# Patient Record
Sex: MALE | Race: WHITE | NOT HISPANIC OR LATINO
[De-identification: names, ages, dates, MRNs, and addresses within clinical notes are randomized per-mention and may not be internally consistent; named-entity substitution may affect disease eponyms.]

---

## 2017-01-10 ENCOUNTER — APPOINTMENT (OUTPATIENT)
Dept: PULMONOLOGY | Facility: CLINIC | Age: 72
End: 2017-01-10

## 2017-01-12 ENCOUNTER — APPOINTMENT (OUTPATIENT)
Dept: PULMONOLOGY | Facility: CLINIC | Age: 72
End: 2017-01-12

## 2017-04-10 ENCOUNTER — OTHER (OUTPATIENT)
Age: 72
End: 2017-04-10

## 2017-09-07 ENCOUNTER — APPOINTMENT (OUTPATIENT)
Dept: PULMONOLOGY | Facility: CLINIC | Age: 72
End: 2017-09-07
Payer: MEDICARE

## 2017-09-07 VITALS — SYSTOLIC BLOOD PRESSURE: 128 MMHG | DIASTOLIC BLOOD PRESSURE: 86 MMHG

## 2017-09-07 VITALS — BODY MASS INDEX: 33.47 KG/M2 | WEIGHT: 240 LBS

## 2017-09-07 VITALS — HEART RATE: 65 BPM | OXYGEN SATURATION: 93 %

## 2017-09-07 PROCEDURE — 99215 OFFICE O/P EST HI 40 MIN: CPT

## 2017-09-19 ENCOUNTER — APPOINTMENT (OUTPATIENT)
Dept: PULMONOLOGY | Facility: CLINIC | Age: 72
End: 2017-09-19

## 2017-11-15 ENCOUNTER — APPOINTMENT (OUTPATIENT)
Dept: PULMONOLOGY | Facility: CLINIC | Age: 72
End: 2017-11-15
Payer: MEDICARE

## 2017-11-15 VITALS
HEIGHT: 71 IN | DIASTOLIC BLOOD PRESSURE: 60 MMHG | SYSTOLIC BLOOD PRESSURE: 112 MMHG | BODY MASS INDEX: 32.62 KG/M2 | WEIGHT: 233 LBS

## 2017-11-15 VITALS — HEART RATE: 85 BPM | OXYGEN SATURATION: 92 %

## 2017-11-15 VITALS — OXYGEN SATURATION: 97 %

## 2017-11-15 PROCEDURE — 99214 OFFICE O/P EST MOD 30 MIN: CPT

## 2017-11-15 RX ORDER — METOPROLOL SUCCINATE 50 MG/1
50 TABLET, EXTENDED RELEASE ORAL
Qty: 90 | Refills: 0 | Status: DISCONTINUED | COMMUNITY
Start: 2017-04-10 | End: 2017-11-15

## 2017-11-15 RX ORDER — CEFDINIR 300 MG/1
300 CAPSULE ORAL
Qty: 20 | Refills: 0 | Status: DISCONTINUED | COMMUNITY
Start: 2017-06-07 | End: 2017-11-15

## 2017-12-11 ENCOUNTER — APPOINTMENT (OUTPATIENT)
Dept: PULMONOLOGY | Facility: CLINIC | Age: 72
End: 2017-12-11

## 2017-12-13 ENCOUNTER — APPOINTMENT (OUTPATIENT)
Dept: PULMONOLOGY | Facility: CLINIC | Age: 72
End: 2017-12-13
Payer: MEDICARE

## 2017-12-13 VITALS
TEMPERATURE: 98.1 F | SYSTOLIC BLOOD PRESSURE: 160 MMHG | RESPIRATION RATE: 14 BRPM | BODY MASS INDEX: 32.62 KG/M2 | WEIGHT: 233 LBS | HEIGHT: 71 IN | HEART RATE: 63 BPM | DIASTOLIC BLOOD PRESSURE: 80 MMHG | OXYGEN SATURATION: 94 %

## 2017-12-13 DIAGNOSIS — J32.9 CHRONIC SINUSITIS, UNSPECIFIED: ICD-10-CM

## 2017-12-13 DIAGNOSIS — R09.02 HYPOXEMIA: ICD-10-CM

## 2017-12-13 PROCEDURE — 99215 OFFICE O/P EST HI 40 MIN: CPT | Mod: 25

## 2017-12-13 PROCEDURE — 94010 BREATHING CAPACITY TEST: CPT

## 2018-01-19 ENCOUNTER — APPOINTMENT (OUTPATIENT)
Dept: PULMONOLOGY | Facility: CLINIC | Age: 73
End: 2018-01-19
Payer: MEDICARE

## 2018-01-19 VITALS
BODY MASS INDEX: 32.78 KG/M2 | SYSTOLIC BLOOD PRESSURE: 138 MMHG | HEART RATE: 89 BPM | HEIGHT: 71 IN | DIASTOLIC BLOOD PRESSURE: 70 MMHG

## 2018-01-19 VITALS — WEIGHT: 235 LBS | BODY MASS INDEX: 32.78 KG/M2

## 2018-01-19 DIAGNOSIS — Z01.811 ENCOUNTER FOR PREPROCEDURAL RESPIRATORY EXAMINATION: ICD-10-CM

## 2018-01-19 PROCEDURE — 94010 BREATHING CAPACITY TEST: CPT

## 2018-01-19 PROCEDURE — 99214 OFFICE O/P EST MOD 30 MIN: CPT | Mod: 25

## 2018-01-22 ENCOUNTER — APPOINTMENT (OUTPATIENT)
Dept: PULMONOLOGY | Facility: CLINIC | Age: 73
End: 2018-01-22
Payer: MEDICARE

## 2018-01-22 VITALS
BODY MASS INDEX: 32.5 KG/M2 | WEIGHT: 233 LBS | HEART RATE: 66 BPM | SYSTOLIC BLOOD PRESSURE: 130 MMHG | DIASTOLIC BLOOD PRESSURE: 80 MMHG

## 2018-01-22 VITALS — RESPIRATION RATE: 16 BRPM

## 2018-01-22 VITALS — OXYGEN SATURATION: 93 %

## 2018-01-22 PROCEDURE — 99214 OFFICE O/P EST MOD 30 MIN: CPT

## 2018-01-22 RX ORDER — METHYLPREDNISOLONE 4 MG/1
4 TABLET ORAL
Qty: 1 | Refills: 3 | Status: DISCONTINUED | COMMUNITY
Start: 2017-11-15 | End: 2018-01-22

## 2018-01-22 RX ORDER — POTASSIUM CHLORIDE 750 MG/1
10 TABLET, EXTENDED RELEASE ORAL
Refills: 0 | Status: DISCONTINUED | COMMUNITY
End: 2018-01-22

## 2018-03-26 ENCOUNTER — APPOINTMENT (OUTPATIENT)
Dept: GASTROENTEROLOGY | Facility: CLINIC | Age: 73
End: 2018-03-26
Payer: MEDICARE

## 2018-03-26 VITALS
SYSTOLIC BLOOD PRESSURE: 123 MMHG | WEIGHT: 218 LBS | DIASTOLIC BLOOD PRESSURE: 64 MMHG | RESPIRATION RATE: 16 BRPM | HEIGHT: 71 IN | OXYGEN SATURATION: 98 % | BODY MASS INDEX: 30.52 KG/M2 | HEART RATE: 60 BPM

## 2018-03-26 DIAGNOSIS — Z03.89 ENCOUNTER FOR OBSERVATION FOR OTHER SUSPECTED DISEASES AND CONDITIONS RULED OUT: ICD-10-CM

## 2018-03-26 PROCEDURE — 99204 OFFICE O/P NEW MOD 45 MIN: CPT

## 2018-03-26 PROCEDURE — 82270 OCCULT BLOOD FECES: CPT

## 2018-03-26 RX ORDER — AZELASTINE HYDROCHLORIDE 137 UG/1
0.1 SPRAY, METERED NASAL
Qty: 30 | Refills: 0 | Status: DISCONTINUED | COMMUNITY
Start: 2017-06-07 | End: 2018-03-26

## 2018-03-29 ENCOUNTER — RX RENEWAL (OUTPATIENT)
Age: 73
End: 2018-03-29

## 2018-04-23 ENCOUNTER — APPOINTMENT (OUTPATIENT)
Dept: PULMONOLOGY | Facility: CLINIC | Age: 73
End: 2018-04-23

## 2018-04-27 ENCOUNTER — APPOINTMENT (OUTPATIENT)
Dept: PULMONOLOGY | Facility: CLINIC | Age: 73
End: 2018-04-27
Payer: MEDICARE

## 2018-04-27 VITALS
WEIGHT: 235 LBS | HEART RATE: 62 BPM | SYSTOLIC BLOOD PRESSURE: 118 MMHG | HEIGHT: 71 IN | BODY MASS INDEX: 32.9 KG/M2 | DIASTOLIC BLOOD PRESSURE: 74 MMHG | OXYGEN SATURATION: 92 %

## 2018-04-27 PROCEDURE — 99215 OFFICE O/P EST HI 40 MIN: CPT

## 2018-05-10 ENCOUNTER — OUTPATIENT (OUTPATIENT)
Dept: OUTPATIENT SERVICES | Facility: HOSPITAL | Age: 73
LOS: 1 days | End: 2018-05-10
Payer: MEDICARE

## 2018-05-10 ENCOUNTER — APPOINTMENT (OUTPATIENT)
Dept: GASTROENTEROLOGY | Facility: GI CENTER | Age: 73
End: 2018-05-10
Payer: MEDICARE

## 2018-05-10 ENCOUNTER — RESULT REVIEW (OUTPATIENT)
Age: 73
End: 2018-05-10

## 2018-05-10 DIAGNOSIS — Z12.11 ENCOUNTER FOR SCREENING FOR MALIGNANT NEOPLASM OF COLON: ICD-10-CM

## 2018-05-10 DIAGNOSIS — Z80.0 FAMILY HISTORY OF MALIGNANT NEOPLASM OF DIGESTIVE ORGANS: ICD-10-CM

## 2018-05-10 PROCEDURE — 88305 TISSUE EXAM BY PATHOLOGIST: CPT

## 2018-05-10 PROCEDURE — 88305 TISSUE EXAM BY PATHOLOGIST: CPT | Mod: 26

## 2018-05-10 PROCEDURE — 45380 COLONOSCOPY AND BIOPSY: CPT | Mod: PT

## 2018-05-10 PROCEDURE — 45380 COLONOSCOPY AND BIOPSY: CPT

## 2018-05-15 LAB — SURGICAL PATHOLOGY FINAL REPORT - CH: SIGNIFICANT CHANGE UP

## 2018-10-01 ENCOUNTER — APPOINTMENT (OUTPATIENT)
Dept: PULMONOLOGY | Facility: CLINIC | Age: 73
End: 2018-10-01
Payer: MEDICARE

## 2018-10-01 VITALS — DIASTOLIC BLOOD PRESSURE: 60 MMHG | SYSTOLIC BLOOD PRESSURE: 118 MMHG | BODY MASS INDEX: 32.92 KG/M2 | WEIGHT: 236 LBS

## 2018-10-01 VITALS — HEART RATE: 56 BPM | OXYGEN SATURATION: 94 %

## 2018-10-01 DIAGNOSIS — J45.909 UNSPECIFIED ASTHMA, UNCOMPLICATED: ICD-10-CM

## 2018-10-01 DIAGNOSIS — R59.0 LOCALIZED ENLARGED LYMPH NODES: ICD-10-CM

## 2018-10-01 DIAGNOSIS — G47.33 OBSTRUCTIVE SLEEP APNEA (ADULT) (PEDIATRIC): ICD-10-CM

## 2018-10-01 PROCEDURE — 99215 OFFICE O/P EST HI 40 MIN: CPT

## 2018-10-01 RX ORDER — POLYETHYLENE GLYCOL-3350, SODIUM CHLORIDE, POTASSIUM CHLORIDE AND SODIUM BICARBONATE 420; 11.2; 5.72; 1.48 G/438.4G; G/438.4G; G/438.4G; G/438.4G
420 POWDER, FOR SOLUTION ORAL
Qty: 4000 | Refills: 0 | Status: DISCONTINUED | COMMUNITY
Start: 2017-11-13 | End: 2018-10-01

## 2018-10-01 RX ORDER — SPIRONOLACTONE 25 MG/1
25 TABLET ORAL
Qty: 90 | Refills: 0 | Status: DISCONTINUED | COMMUNITY
Start: 2018-02-01 | End: 2018-10-01

## 2018-10-01 RX ORDER — SODIUM SULFATE, POTASSIUM SULFATE, MAGNESIUM SULFATE 17.5; 3.13; 1.6 G/ML; G/ML; G/ML
17.5-3.13-1.6 SOLUTION, CONCENTRATE ORAL
Qty: 1 | Refills: 0 | Status: DISCONTINUED | COMMUNITY
Start: 2018-03-29 | End: 2018-10-01

## 2018-10-01 RX ORDER — AMLODIPINE BESYLATE 10 MG/1
10 TABLET ORAL
Qty: 90 | Refills: 0 | Status: DISCONTINUED | COMMUNITY
Start: 2017-04-25 | End: 2018-10-01

## 2019-02-12 ENCOUNTER — MESSAGE (OUTPATIENT)
Age: 74
End: 2019-02-12

## 2019-02-20 ENCOUNTER — APPOINTMENT (OUTPATIENT)
Dept: PULMONOLOGY | Facility: CLINIC | Age: 74
End: 2019-02-20

## 2019-04-08 ENCOUNTER — APPOINTMENT (OUTPATIENT)
Dept: GASTROENTEROLOGY | Facility: CLINIC | Age: 74
End: 2019-04-08
Payer: MEDICARE

## 2019-04-08 VITALS
RESPIRATION RATE: 15 BRPM | HEART RATE: 60 BPM | OXYGEN SATURATION: 98 % | HEIGHT: 71 IN | BODY MASS INDEX: 32.48 KG/M2 | DIASTOLIC BLOOD PRESSURE: 76 MMHG | SYSTOLIC BLOOD PRESSURE: 156 MMHG | WEIGHT: 232 LBS

## 2019-04-08 DIAGNOSIS — Z87.891 PERSONAL HISTORY OF NICOTINE DEPENDENCE: ICD-10-CM

## 2019-04-08 PROCEDURE — 99214 OFFICE O/P EST MOD 30 MIN: CPT

## 2019-04-08 PROCEDURE — 82272 OCCULT BLD FECES 1-3 TESTS: CPT

## 2019-04-08 RX ORDER — FUROSEMIDE 40 MG/1
40 TABLET ORAL
Qty: 90 | Refills: 0 | Status: DISCONTINUED | COMMUNITY
Start: 2018-04-20 | End: 2019-04-08

## 2019-04-08 NOTE — ASSESSMENT
[FreeTextEntry1] : New fasting, dyspepsia, and some nocturnal symptoms. All seemingly better with the use of an H2 receptor antagonist. No medication side effects. Multiple significant comorbidities, medical conditions including, obstructive sleep apnea, and pulmonary hypertension and thus, patient is a poor candidate for elective endoscopic procedures. \par Plan:  Urea breath test and treat accordingly. Continue with the Pepcid. Perform upper GI series. We'll for results. Office followup in 4 months. Will hold on endoscopy presently.

## 2019-04-08 NOTE — PHYSICAL EXAM
[General Appearance - Alert] : alert [General Appearance - In No Acute Distress] : in no acute distress [Sclera] : the sclera and conjunctiva were normal [PERRL With Normal Accommodation] : pupils were equal in size, round, and reactive to light [Extraocular Movements] : extraocular movements were intact [Outer Ear] : the ears and nose were normal in appearance [Oropharynx] : the oropharynx was normal [Neck Appearance] : the appearance of the neck was normal [Neck Cervical Mass (___cm)] : no neck mass was observed [Jugular Venous Distention Increased] : there was no jugular-venous distention [Thyroid Diffuse Enlargement] : the thyroid was not enlarged [Thyroid Nodule] : there were no palpable thyroid nodules [Auscultation Breath Sounds / Voice Sounds] : lungs were clear to auscultation bilaterally [Heart Rate And Rhythm] : heart rate was normal and rhythm regular [Heart Sounds Gallop] : no gallops [Heart Sounds] : normal S1 and S2 [Murmurs] : no murmurs [Heart Sounds Pericardial Friction Rub] : no pericardial rub [Bowel Sounds] : normal bowel sounds [Abdomen Soft] : soft [Abdomen Tenderness] : non-tender [] : no hepato-splenomegaly [Abdomen Mass (___ Cm)] : no abdominal mass palpated [Normal Sphincter Tone] : normal sphincter tone [No Rectal Mass] : no rectal mass [Occult Blood Positive] : stool positive for occult blood [Internal Hemorrhoid] : no internal hemorrhoids [External Hemorrhoid] : no external hemorrhoids [FreeTextEntry1] : No hemorrhoids. No lesions. small flecks of brown stool, but occult blood positive No lesions.

## 2019-04-08 NOTE — HISTORY OF PRESENT ILLNESS
[FreeTextEntry1] : 73-year-old white male with history of obstructive sleep apnea, and pulmonary hypertension, who underwent a screening colonoscopy one year ago with the finding of hemorrhoids and diverticulosis and a very small right-sided hyperplastic polyp. Patient now presents with episodic dyspepsia usually the fasting, over the course of the past 3 weeks. No prior history of peptic ulcer disease. No bleeding overtly. No diarrhea no constipation no weight loss. Patient does have a history of hemorrhoids. Patient denies heartburn, regurgitation, dysphagia or odynophagia.. Has been started on an H2 blocker at bedtime, Pepcid 40 mg. Notes some improvement overall in symptoms. No upper GI alarm symptoms.

## 2019-05-30 ENCOUNTER — APPOINTMENT (OUTPATIENT)
Dept: GASTROENTEROLOGY | Facility: CLINIC | Age: 74
End: 2019-05-30
Payer: MEDICARE

## 2019-05-30 VITALS
WEIGHT: 238 LBS | BODY MASS INDEX: 33.32 KG/M2 | HEIGHT: 71 IN | DIASTOLIC BLOOD PRESSURE: 75 MMHG | SYSTOLIC BLOOD PRESSURE: 120 MMHG | HEART RATE: 76 BPM

## 2019-05-30 PROCEDURE — 99214 OFFICE O/P EST MOD 30 MIN: CPT

## 2019-05-31 NOTE — HISTORY OF PRESENT ILLNESS
[FreeTextEntry1] : 73-year-old white male with history of obstructive sleep apnea, pulmonary hypertension, replaced aortic valve diverticulosis and hemorrhoids. Patient had recently developed dyspepsia, somewhat responsive to the use of H2 RA's. CAT scan performed in 3/26/19 allegedly shows cirrhosis and ascites. Recent upper GI series was unremarkable. Mild  duodenal wall thickening noted. No ulcer, tumor, or inflammation identified. Area breath test was negative. \par Prior colonoscopy in 2018 had shown diverticulosis and hemorrhoids. \par Patient now has developed new atrial fibrillation and has been placed on anticoagulation with Coumadin. He is scheduled for cardiac catheterization next week at [facility].\par He reports recent dietary indiscretions with weight increase to 245 and more recently, gradual reduction to 238.

## 2019-05-31 NOTE — ASSESSMENT
[FreeTextEntry1] : Upper GI series was negative. Urea breath test was negative.\par On exam. Patient now has anasarca, edema, and ascites, and a protuberant abdomen. Recent CAT scan from March showed cirrhotic liver and ascites.\par Patient has a long history of pulmonary hypertension, obstructive sleep apnea. It is likely that his cirrhosis is cardiac in origin. He is already on generous doses of diuretics via the renal service. He is known to have renal insufficiency.\par He is awaiting further cardiac evaluation next week at [facility]. GI office followup in 3 months. Maintain low salt diet.

## 2019-07-30 ENCOUNTER — OTHER (OUTPATIENT)
Age: 74
End: 2019-07-30

## 2019-08-09 ENCOUNTER — APPOINTMENT (OUTPATIENT)
Dept: GASTROENTEROLOGY | Facility: CLINIC | Age: 74
End: 2019-08-09
Payer: MEDICARE

## 2019-08-09 VITALS
WEIGHT: 232 LBS | HEART RATE: 67 BPM | DIASTOLIC BLOOD PRESSURE: 70 MMHG | SYSTOLIC BLOOD PRESSURE: 130 MMHG | BODY MASS INDEX: 32.48 KG/M2 | HEIGHT: 71 IN

## 2019-08-09 PROCEDURE — 99215 OFFICE O/P EST HI 40 MIN: CPT

## 2019-08-09 RX ORDER — ALBUTEROL SULFATE 90 UG/1
108 (90 BASE) AEROSOL, METERED RESPIRATORY (INHALATION)
Qty: 18 | Refills: 0 | Status: DISCONTINUED | COMMUNITY
Start: 2017-06-07 | End: 2019-08-09

## 2019-08-09 RX ORDER — TAMSULOSIN HYDROCHLORIDE 0.4 MG/1
0.4 CAPSULE ORAL
Refills: 0 | Status: DISCONTINUED | COMMUNITY
End: 2019-08-09

## 2019-08-09 RX ORDER — AMOXICILLIN AND CLAVULANATE POTASSIUM 500; 125 MG/1; 1/1
500-125 TABLET, FILM COATED ORAL
Refills: 0 | Status: DISCONTINUED | COMMUNITY
End: 2019-08-09

## 2019-08-09 NOTE — PHYSICAL EXAM
[General Appearance - Alert] : alert [General Appearance - In No Acute Distress] : in no acute distress [PERRL With Normal Accommodation] : pupils were equal in size, round, and reactive to light [Sclera] : the sclera and conjunctiva were normal [Extraocular Movements] : extraocular movements were intact [Outer Ear] : the ears and nose were normal in appearance [Oropharynx] : the oropharynx was normal [Neck Appearance] : the appearance of the neck was normal [Neck Cervical Mass (___cm)] : no neck mass was observed [Thyroid Diffuse Enlargement] : the thyroid was not enlarged [Jugular Venous Distention Increased] : there was no jugular-venous distention [Thyroid Nodule] : there were no palpable thyroid nodules [] : no respiratory distress [Auscultation Breath Sounds / Voice Sounds] : lungs were clear to auscultation bilaterally [Heart Rate And Rhythm] : heart rate was normal and rhythm regular [Heart Sounds] : normal S1 and S2 [Heart Sounds Gallop] : no gallops [Murmurs] : no murmurs [Heart Sounds Pericardial Friction Rub] : no pericardial rub [Normal Sphincter Tone] : normal sphincter tone [No Rectal Mass] : no rectal mass [Internal Hemorrhoid] : no internal hemorrhoids [External Hemorrhoid] : no external hemorrhoids [Occult Blood Positive] : stool positive for occult blood [FreeTextEntry1] : +3 pedal edema extending to the thighs

## 2019-08-09 NOTE — ASSESSMENT
[FreeTextEntry1] : Ascites, renal failure, unclear etiology. BPH, may be contributing. New Jain catheter inserted just 3 days ago. Prior fatty liver on imaging. No prior obvious cirrhosis. No prior history of liver disease. Patient is acting as if he has cirrhosis and likely cryptogenic. Recent blood work shows modest elevations of the alkaline phosphatase and low platelet count and normal LFTs otherwise. No history of encephalopathy or GI bleeding.\par Acute renal failure  noted on blood work.\par Patient is newly on Coumadin for atrial fibrillation. Tolerating well. No overt bleeding. \par Plan for abdominal sonogram to evaluate the liver and marked site for ascites tap. Patient referred to us outside hospital for paracentesis via interventional radiology. Patient advised to stop Coumadin 5 days prior to the paracentesis; large-volume paracentesis requested. Repeat blood work for hepatitis profiles and autoimmune panels. Repeat CBC, chemistry and PT, INR. Office followup here in 2 months. He was advised low-sodium diet. Daily, weight log advised. \par

## 2019-08-09 NOTE — HISTORY OF PRESENT ILLNESS
[FreeTextEntry1] : 73-year-old white male with history of hypertension, hyperlipidemia, gout, chronic, pulmonary hypertension, for greater than 4 years. Recent cardiac catheterization at [facility]:  negative for coronary disease. Recent imaging had shown the presence of ascites. Followed by cardiology urology nephrology mostly from [facility]. Alleges that urology evaluation indicated urinary bladder to be poorly emptying. Blood work done one week ago for me indicates a BUN of 81 and creatinine of 2.1. Patient had a Jain catheter inserted 3 days ago at urology. He is due for urology followup in a short period of time. He is already on Bumex as a diuretic 20 mg per day. He has recently been started on Coumadin 7.5 mg per day for new atrial fibrillation.\par There is been no prior workup for liver disease or cirrhosis.  Fatty liver has previously been observed on imaging. Ascites is a new finding. Patient's weight has been fluctuating despite being on diuretics and, despite being adherent to a low sodium diet.\par Patient gives no prior history of liver disease. No history of viral hepatitis or needle stick injury or transfusions. Past surgical history mitral valve repair.  \par Current weight 232. The patient has normal abdominal distention and diffuse brawny leg edema. Denies having any confusion. No internal bleeding. Even with the new Coumadin. No history of alcohol abuse.

## 2019-08-13 ENCOUNTER — FORM ENCOUNTER (OUTPATIENT)
Age: 74
End: 2019-08-13

## 2019-08-14 ENCOUNTER — OUTPATIENT (OUTPATIENT)
Dept: OUTPATIENT SERVICES | Facility: HOSPITAL | Age: 74
LOS: 1 days | End: 2019-08-14

## 2019-08-14 ENCOUNTER — APPOINTMENT (OUTPATIENT)
Dept: ULTRASOUND IMAGING | Facility: CLINIC | Age: 74
End: 2019-08-14
Payer: MEDICARE

## 2019-08-14 DIAGNOSIS — I27.20 PULMONARY HYPERTENSION, UNSPECIFIED: ICD-10-CM

## 2019-08-14 PROCEDURE — 76700 US EXAM ABDOM COMPLETE: CPT | Mod: 26

## 2019-08-28 ENCOUNTER — APPOINTMENT (OUTPATIENT)
Dept: UROLOGY | Facility: CLINIC | Age: 74
End: 2019-08-28
Payer: MEDICARE

## 2019-08-28 VITALS
WEIGHT: 235 LBS | BODY MASS INDEX: 32.9 KG/M2 | SYSTOLIC BLOOD PRESSURE: 135 MMHG | HEART RATE: 61 BPM | HEIGHT: 71 IN | OXYGEN SATURATION: 95 % | DIASTOLIC BLOOD PRESSURE: 76 MMHG

## 2019-08-28 PROCEDURE — 99203 OFFICE O/P NEW LOW 30 MIN: CPT

## 2019-08-28 NOTE — REVIEW OF SYSTEMS
[Shortness Of Breath] : shortness of breath [see HPI] : see HPI [Limb Weakness] : limb weakness [Negative] : Heme/Lymph

## 2019-09-03 NOTE — ASSESSMENT
[FreeTextEntry1] : 74 yo male with urinary retention s/p TURP.  Will plan for UDS with cysto to evaluate bladder contractility and determine best treatment option.  If persistent outlet obstruction, may require redo TURP.

## 2019-09-03 NOTE — PHYSICAL EXAM
[General Appearance - Well Developed] : well developed [General Appearance - Well Nourished] : well nourished [Normal Appearance] : normal appearance [Well Groomed] : well groomed [General Appearance - In No Acute Distress] : no acute distress [Exaggerated Use Of Accessory Muscles For Inspiration] : no accessory muscle use [Respiration, Rhythm And Depth] : normal respiratory rhythm and effort [Abdomen Soft] : soft [Costovertebral Angle Tenderness] : no ~M costovertebral angle tenderness [Abdomen Tenderness] : non-tender [Normal Station and Gait] : the gait and station were normal for the patient's age [] : no rash [No Focal Deficits] : no focal deficits [Sensation] : the sensory exam was normal to light touch and pinprick [Oriented To Time, Place, And Person] : oriented to person, place, and time [Affect] : the affect was normal [Not Anxious] : not anxious [FreeTextEntry1] : Jain Catheter in place and attached to leg bag.

## 2019-09-03 NOTE — HISTORY OF PRESENT ILLNESS
[Urinary Retention] : urinary retention [FreeTextEntry1] : Mr Lopez is a 72 yo male with urinary retention and urinary issues. He was seen at [facility] with urinary retention and had a Jain placed in October 2019.  He is a patient of Dr Bonilla (Urologist) who did a TURP in 12/2018 and in February 2019 and continues to have urinary retention. He currently has a Jain.  He is here for a second opinion for further treatment.  He had been offered continuation of indwelling Jain, CIC, and suprapubic tube.  He denies having had a urodynamics at any time.

## 2019-09-10 ENCOUNTER — APPOINTMENT (OUTPATIENT)
Dept: UROLOGY | Facility: CLINIC | Age: 74
End: 2019-09-10
Payer: MEDICARE

## 2019-09-10 ENCOUNTER — OUTPATIENT (OUTPATIENT)
Dept: OUTPATIENT SERVICES | Facility: HOSPITAL | Age: 74
LOS: 1 days | End: 2019-09-10
Payer: MEDICARE

## 2019-09-10 DIAGNOSIS — R35.0 FREQUENCY OF MICTURITION: ICD-10-CM

## 2019-09-10 PROCEDURE — 51784 ANAL/URINARY MUSCLE STUDY: CPT

## 2019-09-10 PROCEDURE — 51784 ANAL/URINARY MUSCLE STUDY: CPT | Mod: 26

## 2019-09-10 PROCEDURE — 51741 ELECTRO-UROFLOWMETRY FIRST: CPT | Mod: 26

## 2019-09-10 PROCEDURE — 51728 CYSTOMETROGRAM W/VP: CPT | Mod: 26

## 2019-09-10 PROCEDURE — 51797 INTRAABDOMINAL PRESSURE TEST: CPT | Mod: 26

## 2019-09-10 PROCEDURE — 51741 ELECTRO-UROFLOWMETRY FIRST: CPT

## 2019-09-10 PROCEDURE — 52000 CYSTOURETHROSCOPY: CPT

## 2019-09-10 PROCEDURE — 51797 INTRAABDOMINAL PRESSURE TEST: CPT

## 2019-09-10 PROCEDURE — 99214 OFFICE O/P EST MOD 30 MIN: CPT | Mod: 25

## 2019-09-10 PROCEDURE — 51728 CYSTOMETROGRAM W/VP: CPT

## 2019-09-13 NOTE — PHYSICAL EXAM
[General Appearance - Well Developed] : well developed [General Appearance - Well Nourished] : well nourished [Normal Appearance] : normal appearance [Well Groomed] : well groomed [General Appearance - In No Acute Distress] : no acute distress [Abdomen Soft] : soft [Abdomen Tenderness] : non-tender [Urethral Meatus] : meatus normal [Costovertebral Angle Tenderness] : no ~M costovertebral angle tenderness [Testes Tenderness] : no tenderness of the testes [Penis Abnormality] : normal circumcised penis [Testes Mass (___cm)] : there were no testicular masses [Edema] : no peripheral edema [] : no respiratory distress [Respiration, Rhythm And Depth] : normal respiratory rhythm and effort [Exaggerated Use Of Accessory Muscles For Inspiration] : no accessory muscle use

## 2019-09-13 NOTE — HISTORY OF PRESENT ILLNESS
[FreeTextEntry1] : 74 yo male with urinary retention and urinary issues. He was seen at an outside hospital with urinary retention and had a Jain placed in October 2018. He underwent TURP by Dr. Bonilla in 12/2018 and in again in February 2019.  Since then continued urinary retention, failed several void trials.  He currently has a Jain catheter, with clear urine.  He has been on finasteride and tamsulosin. \par \par Here for urodynamic studies and cystoscopy.\par

## 2019-09-13 NOTE — ASSESSMENT
[FreeTextEntry1] : Cystoscopy today:  bladder outlet open post resection.  No urethral stricture, no bladder stones.\par UDS:  adequate bladder capacity, decreased compliance at capacity. During voiding phase, small bladder contractility but minimal void with high residual.  Remainder of void with valsalva.\par \par Reviewed studies with the patient and his son.\par Given poor bladder contractility, open bladder neck, he is unlikely to benefit from further surgery.\par Recommend to start CIC 3-4x per day.\par Was taught today by nursing and was able to perform CIC w/o difficulty under direct observation.  He will likely require CIC indefinitely.\par \par Follow up in 4 weeks.

## 2019-09-18 DIAGNOSIS — R33.9 RETENTION OF URINE, UNSPECIFIED: ICD-10-CM

## 2019-10-18 ENCOUNTER — APPOINTMENT (OUTPATIENT)
Dept: GASTROENTEROLOGY | Facility: CLINIC | Age: 74
End: 2019-10-18
Payer: MEDICARE

## 2019-10-18 VITALS
WEIGHT: 233 LBS | DIASTOLIC BLOOD PRESSURE: 64 MMHG | HEIGHT: 61 IN | SYSTOLIC BLOOD PRESSURE: 140 MMHG | RESPIRATION RATE: 16 BRPM | HEART RATE: 62 BPM | OXYGEN SATURATION: 99 % | BODY MASS INDEX: 43.99 KG/M2

## 2019-10-18 PROCEDURE — 99214 OFFICE O/P EST MOD 30 MIN: CPT

## 2019-10-18 RX ORDER — IRBESARTAN 300 MG/1
300 TABLET ORAL
Refills: 0 | Status: DISCONTINUED | COMMUNITY
End: 2019-10-18

## 2019-10-18 NOTE — ASSESSMENT
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 Repeat request for multiple serologies to exclude chronic viral and autoimmune disease. Renal function, too poor to allow for contrast examinations either with CAT scan or MRI of therefore, pulse deferred. Serial followup by renal. Not enough ascites for paracentesis. Therefore, deferred.\par GI office follow up here in 2 months with results of blood work to be reviewed then.

## 2019-10-18 NOTE — PHYSICAL EXAM
[General Appearance - Alert] : alert [General Appearance - In No Acute Distress] : in no acute distress [Sclera] : the sclera and conjunctiva were normal [PERRL With Normal Accommodation] : pupils were equal in size, round, and reactive to light [Extraocular Movements] : extraocular movements were intact [Outer Ear] : the ears and nose were normal in appearance [Oropharynx] : the oropharynx was normal [Neck Appearance] : the appearance of the neck was normal [Neck Cervical Mass (___cm)] : no neck mass was observed [Jugular Venous Distention Increased] : there was no jugular-venous distention [Thyroid Diffuse Enlargement] : the thyroid was not enlarged [Thyroid Nodule] : there were no palpable thyroid nodules [] : no respiratory distress [Auscultation Breath Sounds / Voice Sounds] : lungs were clear to auscultation bilaterally [Heart Rate And Rhythm] : heart rate was normal and rhythm regular [Heart Sounds] : normal S1 and S2 [Heart Sounds Gallop] : no gallops [Murmurs] : no murmurs [Heart Sounds Pericardial Friction Rub] : no pericardial rub [Normal Sphincter Tone] : normal sphincter tone [No Rectal Mass] : no rectal mass [Internal Hemorrhoid] : no internal hemorrhoids [External Hemorrhoid] : no external hemorrhoids [Occult Blood Positive] : stool positive for occult blood [FreeTextEntry1] : +3 pedal edema extending to the thighs

## 2019-10-18 NOTE — HISTORY OF PRESENT ILLNESS
[FreeTextEntry1] : 74-year-old white male with recent development of cryptogenic cirrhosis. Likely cardiac/pulmonary in etiology. Patient is known to have chronic pulmonary hypertension, therefore, elevated right-sided heart pressures. A recent sonogram has shown findings of cirrhosis. Small ascites. Chronic hepatic disease and nodular liver. Recent blood work from PCP has been reviewed. Albumin low at 2.6. Bilirubin 0.3, alkaline phosphatase 400 and BUN 65, creatinine 2.2.  My request of blood work for serologies and autoimmune evaluation of chronic liver disease was never performed. Due to a coding issue.\par Denies confusion. No bleeding. Weight about the same. Less likely edema. Fair appetite. Denies abdominal pain or distention. Remains compliant with multiple medications including Bumex 2 mg per day. \par Recent  evaluation identified as pork f in. Adequate prostatectomy. Patient requires CIC and has been compliant with the same. Jain has been eliminated.

## 2019-10-18 NOTE — REASON FOR VISIT
[Follow-Up: _____] : a [unfilled] follow-up visit [FreeTextEntry1] : Cryptogenic cirrhosis, ascites, renal insufficiency, diuretic induced.

## 2019-10-23 ENCOUNTER — APPOINTMENT (OUTPATIENT)
Dept: UROLOGY | Facility: CLINIC | Age: 74
End: 2019-10-23
Payer: MEDICARE

## 2019-10-23 VITALS
OXYGEN SATURATION: 95 % | HEIGHT: 71 IN | SYSTOLIC BLOOD PRESSURE: 123 MMHG | DIASTOLIC BLOOD PRESSURE: 73 MMHG | WEIGHT: 235 LBS | BODY MASS INDEX: 32.9 KG/M2 | HEART RATE: 63 BPM

## 2019-10-23 PROCEDURE — 99213 OFFICE O/P EST LOW 20 MIN: CPT

## 2019-10-23 NOTE — ASSESSMENT
[FreeTextEntry1] : Continue CIC q6 hours.\par Continue finasteride 5 mg daily.\par Follow up in 6 months.

## 2019-10-23 NOTE — HISTORY OF PRESENT ILLNESS
[FreeTextEntry1] : Here for 6 week follow up.\par Started on CIC q6 hours after UDS study showed inadequate bladder contractility. \par No difficulty with passing the catheter.  Has minimal urination before catheterization.  Minimal incontinence.  Overall feels well and comfortable with CIC.  No dysuria, stress incontinence.  No abdominal or flank pain.  No gross hematuria.

## 2019-11-08 LAB
A1AT SERPL-MCNC: 190 MG/DL
AFP-TM SERPL-MCNC: <1.8 NG/ML
ALBUMIN SERPL ELPH-MCNC: 2.5 G/DL
ALP BLD-CCNC: 237 U/L
ALT SERPL-CCNC: 24 U/L
ANION GAP SERPL CALC-SCNC: 15 MMOL/L
AST SERPL-CCNC: 37 U/L
BASOPHILS # BLD AUTO: 0.04 K/UL
BASOPHILS NFR BLD AUTO: 0.6 %
BILIRUB SERPL-MCNC: 0.6 MG/DL
BUN SERPL-MCNC: 65 MG/DL
CALCIUM SERPL-MCNC: 8.1 MG/DL
CERULOPLASMIN SERPL-MCNC: 33 MG/DL
CHLORIDE SERPL-SCNC: 109 MMOL/L
CO2 SERPL-SCNC: 22 MMOL/L
CREAT SERPL-MCNC: 1.92 MG/DL
EOSINOPHIL # BLD AUTO: 0.22 K/UL
EOSINOPHIL NFR BLD AUTO: 3.2 %
FERRITIN SERPL-MCNC: 35 NG/ML
GLUCOSE SERPL-MCNC: 99 MG/DL
HBV CORE IGG+IGM SER QL: NONREACTIVE
HBV CORE IGM SER QL: NONREACTIVE
HBV SURFACE AB SER QL: NONREACTIVE
HBV SURFACE AG SER QL: NONREACTIVE
HCT VFR BLD CALC: 40.3 %
HCV AB SER QL: NONREACTIVE
HCV S/CO RATIO: 0.09 S/CO
HGB BLD-MCNC: 12.2 G/DL
IMM GRANULOCYTES NFR BLD AUTO: 0.3 %
INR PPP: 3.47 RATIO
LKM AB SER QL IF: <20.1 UNITS
LYMPHOCYTES # BLD AUTO: 0.57 K/UL
LYMPHOCYTES NFR BLD AUTO: 8.2 %
MAN DIFF?: NORMAL
MCHC RBC-ENTMCNC: 29 PG
MCHC RBC-ENTMCNC: 30.3 GM/DL
MCV RBC AUTO: 95.7 FL
MITOCHONDRIA AB SER IF-ACNC: NORMAL
MONOCYTES # BLD AUTO: 0.79 K/UL
MONOCYTES NFR BLD AUTO: 11.4 %
NEUTROPHILS # BLD AUTO: 5.28 K/UL
NEUTROPHILS NFR BLD AUTO: 76.3 %
PLATELET # BLD AUTO: 149 K/UL
POTASSIUM SERPL-SCNC: 4.5 MMOL/L
PROT SERPL-MCNC: 5.3 G/DL
PT BLD: 40.8 SEC
RBC # BLD: 4.21 M/UL
RBC # FLD: 15.7 %
SMOOTH MUSCLE AB SER QL IF: NORMAL
SODIUM SERPL-SCNC: 145 MMOL/L
WBC # FLD AUTO: 6.92 K/UL

## 2019-11-10 LAB — COPPER SERPL-MCNC: 140 UG/DL

## 2019-11-13 LAB
ANA PAT FLD IF-IMP: ABNORMAL
ANA SER IF-ACNC: ABNORMAL

## 2019-12-18 ENCOUNTER — APPOINTMENT (OUTPATIENT)
Dept: GASTROENTEROLOGY | Facility: CLINIC | Age: 74
End: 2019-12-18
Payer: MEDICARE

## 2019-12-18 VITALS
RESPIRATION RATE: 14 BRPM | DIASTOLIC BLOOD PRESSURE: 78 MMHG | BODY MASS INDEX: 30.8 KG/M2 | HEART RATE: 67 BPM | WEIGHT: 220 LBS | HEIGHT: 71 IN | OXYGEN SATURATION: 97 % | SYSTOLIC BLOOD PRESSURE: 132 MMHG

## 2019-12-18 PROCEDURE — 99214 OFFICE O/P EST MOD 30 MIN: CPT

## 2019-12-18 PROCEDURE — 82272 OCCULT BLD FECES 1-3 TESTS: CPT

## 2019-12-18 NOTE — ASSESSMENT
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 Now, with decompensated cirrhosis with ascites and anasarca. No evidence of chronic viral hepatitis possible element of autoimmune component. Patient is a poor candidate for liver biopsy as he is on chronic anticoagulation for atrial fibrillation.\par Clinic improved with diuretic therapy. Moderate prerenal azotemia persists. Fluid status is tenuous. Considerably improved from 2 months ago.\par Poor candidate for endoscopy for screening for varices.\par Plan repeat blood work now, to include CBC, CMP, coagulation profiles. Repeat blood work in 6 weeks prior to next office visit here in 8 weeks. Maintain same diuretics and her call for problems.\par \par Note: chronic anticoagulation in cirrhotics is fraught with danger for GI bleeding.

## 2019-12-18 NOTE — HISTORY OF PRESENT ILLNESS
[FreeTextEntry1] : 74-year-old white male with history of hyperlipidemia, hypertension, gout, atrial fibrillation, pulmonary hypertension, chronic, with resultant elevated filling pressures on the right side of the heart. Resulting in cirrhosis of the liver from passive congestion. Patient has recently developed decompensation with anasarca and ascites. Recent sonogram showed a patent diffuse hepatocellular disease, and splenomegaly and small ascites. Patient has modest renal insufficiency. He been maintained on chronic diuretic therapy with Bumex 2 mg per day. Since last seen here. 2 months ago reduced from 233 recurrent to 20. BUN and creatinine have actually improved and at work from one month ago shows BUN of 65 and a creatinine of 1.9. Alkaline phosphatase is elevated to 90 as expected. Alpha-fetoprotein is normal. Coagulation profiles are modestly elevated, as would be expected on Coumadin therapy. No jaundice. No bleeding. No confusion\par Patient is known to have BPH and obstructive uropathy. He requires chronic intermittent urethral catheterizations 4 times per day. He is followed by  and this has been regulated and has been successful.\par No significant alcohol history. Recent blood work that indicated. No evidence for chronic viral hepatitis. No autoimmune disease. Interestingly, DEVAN is positive at 1-320.

## 2019-12-18 NOTE — PHYSICAL EXAM
[General Appearance - In No Acute Distress] : in no acute distress [General Appearance - Alert] : alert [Extraocular Movements] : extraocular movements were intact [PERRL With Normal Accommodation] : pupils were equal in size, round, and reactive to light [Sclera] : the sclera and conjunctiva were normal [Neck Appearance] : the appearance of the neck was normal [Outer Ear] : the ears and nose were normal in appearance [Oropharynx] : the oropharynx was normal [Neck Cervical Mass (___cm)] : no neck mass was observed [Jugular Venous Distention Increased] : there was no jugular-venous distention [Thyroid Diffuse Enlargement] : the thyroid was not enlarged [Thyroid Nodule] : there were no palpable thyroid nodules [] : no respiratory distress [Auscultation Breath Sounds / Voice Sounds] : lungs were clear to auscultation bilaterally [Heart Rate And Rhythm] : heart rate was normal and rhythm regular [Heart Sounds] : normal S1 and S2 [Heart Sounds Gallop] : no gallops [Murmurs] : no murmurs [Heart Sounds Pericardial Friction Rub] : no pericardial rub [Normal Sphincter Tone] : normal sphincter tone [Internal Hemorrhoid] : no internal hemorrhoids [No Rectal Mass] : no rectal mass [External Hemorrhoid] : no external hemorrhoids [Occult Blood Positive] : stool positive for occult blood [FreeTextEntry1] : +3 pedal edema extending to the thighs

## 2019-12-20 ENCOUNTER — LABORATORY RESULT (OUTPATIENT)
Age: 74
End: 2019-12-20

## 2019-12-20 LAB
ALBUMIN SERPL ELPH-MCNC: 3.2 G/DL
ALP BLD-CCNC: 154 U/L
ALT SERPL-CCNC: 16 U/L
ANION GAP SERPL CALC-SCNC: 14 MMOL/L
AST SERPL-CCNC: 28 U/L
BASOPHILS # BLD AUTO: 0.03 K/UL
BASOPHILS NFR BLD AUTO: 0.5 %
BILIRUB SERPL-MCNC: 0.8 MG/DL
BUN SERPL-MCNC: 63 MG/DL
CALCIUM SERPL-MCNC: 8.6 MG/DL
CHLORIDE SERPL-SCNC: 104 MMOL/L
CO2 SERPL-SCNC: 26 MMOL/L
CREAT SERPL-MCNC: 1.89 MG/DL
EOSINOPHIL # BLD AUTO: 0.17 K/UL
EOSINOPHIL NFR BLD AUTO: 3.1 %
GLUCOSE SERPL-MCNC: 162 MG/DL
HCT VFR BLD CALC: 39.5 %
HGB BLD-MCNC: 12 G/DL
IMM GRANULOCYTES NFR BLD AUTO: 0.4 %
INR PPP: 2.1 RATIO
LYMPHOCYTES # BLD AUTO: 0.35 K/UL
LYMPHOCYTES NFR BLD AUTO: 6.3 %
MAN DIFF?: NORMAL
MCHC RBC-ENTMCNC: 28.7 PG
MCHC RBC-ENTMCNC: 30.4 GM/DL
MCV RBC AUTO: 94.5 FL
MONOCYTES # BLD AUTO: 0.63 K/UL
MONOCYTES NFR BLD AUTO: 11.4 %
NEUTROPHILS # BLD AUTO: 4.33 K/UL
NEUTROPHILS NFR BLD AUTO: 78.3 %
PLATELET # BLD AUTO: 123 K/UL
POTASSIUM SERPL-SCNC: 4 MMOL/L
PROT SERPL-MCNC: 5.7 G/DL
PT BLD: 24.7 SEC
RBC # BLD: 4.18 M/UL
RBC # FLD: 15.7 %
SODIUM SERPL-SCNC: 144 MMOL/L
WBC # FLD AUTO: 5.53 K/UL

## 2020-01-29 ENCOUNTER — LABORATORY RESULT (OUTPATIENT)
Age: 75
End: 2020-01-29

## 2020-02-03 LAB
ALBUMIN SERPL ELPH-MCNC: 3.5 G/DL
ALP BLD-CCNC: 150 U/L
ALT SERPL-CCNC: 20 U/L
ANION GAP SERPL CALC-SCNC: 14 MMOL/L
AST SERPL-CCNC: 26 U/L
BASOPHILS # BLD AUTO: 0.03 K/UL
BASOPHILS NFR BLD AUTO: 0.5 %
BILIRUB SERPL-MCNC: 0.8 MG/DL
BUN SERPL-MCNC: 73 MG/DL
CALCIUM SERPL-MCNC: 8.8 MG/DL
CHLORIDE SERPL-SCNC: 106 MMOL/L
CO2 SERPL-SCNC: 25 MMOL/L
CREAT SERPL-MCNC: 2.1 MG/DL
EOSINOPHIL # BLD AUTO: 0.23 K/UL
EOSINOPHIL NFR BLD AUTO: 4 %
GLUCOSE SERPL-MCNC: 99 MG/DL
HCT VFR BLD CALC: 41.2 %
HGB BLD-MCNC: 12.2 G/DL
IMM GRANULOCYTES NFR BLD AUTO: 0.3 %
INR PPP: 2.52 RATIO
LYMPHOCYTES # BLD AUTO: 0.44 K/UL
LYMPHOCYTES NFR BLD AUTO: 7.6 %
MAN DIFF?: NORMAL
MCHC RBC-ENTMCNC: 28.1 PG
MCHC RBC-ENTMCNC: 29.6 GM/DL
MCV RBC AUTO: 94.9 FL
MONOCYTES # BLD AUTO: 0.8 K/UL
MONOCYTES NFR BLD AUTO: 13.8 %
NEUTROPHILS # BLD AUTO: 4.28 K/UL
NEUTROPHILS NFR BLD AUTO: 73.8 %
PLATELET # BLD AUTO: NORMAL K/UL
POTASSIUM SERPL-SCNC: 4.2 MMOL/L
PROT SERPL-MCNC: 6.2 G/DL
PT BLD: 29.8 SEC
RBC # BLD: 4.34 M/UL
RBC # FLD: 15.7 %
SODIUM SERPL-SCNC: 145 MMOL/L
WBC # FLD AUTO: 5.8 K/UL

## 2020-02-05 ENCOUNTER — APPOINTMENT (OUTPATIENT)
Dept: UROLOGY | Facility: CLINIC | Age: 75
End: 2020-02-05
Payer: MEDICARE

## 2020-02-05 PROCEDURE — 99213 OFFICE O/P EST LOW 20 MIN: CPT

## 2020-02-06 LAB
PSA FREE FLD-MCNC: NORMAL %
PSA FREE SERPL-MCNC: <0.01 NG/ML
PSA SERPL-MCNC: 0.02 NG/ML

## 2020-02-09 NOTE — HISTORY OF PRESENT ILLNESS
[FreeTextEntry1] : Here for routine follow up.\par Doing well on CIC 4x per day. \par No hematuria, dysuria.  Has noted some voids in between caths.\par No abdominal or flank pain.\par No UTI, fever/chills, urethral discharge.

## 2020-02-09 NOTE — PHYSICAL EXAM
[General Appearance - Well Developed] : well developed [General Appearance - Well Nourished] : well nourished [Normal Appearance] : normal appearance [Well Groomed] : well groomed [General Appearance - In No Acute Distress] : no acute distress [Abdomen Tenderness] : non-tender [Abdomen Soft] : soft [Costovertebral Angle Tenderness] : no ~M costovertebral angle tenderness [Urinary Bladder Findings] : the bladder was normal on palpation

## 2020-03-16 ENCOUNTER — APPOINTMENT (OUTPATIENT)
Dept: GASTROENTEROLOGY | Facility: CLINIC | Age: 75
End: 2020-03-16
Payer: MEDICARE

## 2020-03-16 VITALS
HEART RATE: 74 BPM | RESPIRATION RATE: 14 BRPM | OXYGEN SATURATION: 98 % | WEIGHT: 211 LBS | DIASTOLIC BLOOD PRESSURE: 67 MMHG | SYSTOLIC BLOOD PRESSURE: 137 MMHG | HEIGHT: 71 IN | BODY MASS INDEX: 29.54 KG/M2

## 2020-03-16 PROCEDURE — 99214 OFFICE O/P EST MOD 30 MIN: CPT

## 2020-03-16 NOTE — ASSESSMENT
[FreeTextEntry1] : improvement and decompensation of the cardiac cirrhosis.Virtually all edema and anasarca have been successfully diuresed.  BUN  and creatinine are  preserved, but moderately reduced. Likely due to the diuretic effect.  \par Appropriate for moderate, sodium restriction. Advised two gram sodium diet.\par No change in current medications planned.\par Patient remains at increased risk for development of esophageal varices. Will defer on a screening endoscopy for a few months.\par Repeat blood work 2 weeks prior to next office visit in 3 months. CBC, CMP, INR, AFP.

## 2020-03-16 NOTE — HISTORY OF PRESENT ILLNESS
[FreeTextEntry1] : 74-year-old white male with history of hypertension, hyperlipidemia, gout, atrial fibrillation, pulmonary hypertension, CHF, who has recently developed compensated cardiac cirrhosis.\par Patient was last seen 3 months ago with modest weight loss on diuretic therapy. We have been reduced from 233-220 and currently has been reduced to 211 while being maintained on Bumex 2 mg per day. Patient has chronic renal insufficiency, which has not significantly changed of late. With additional weight loss. Patient feels considerably better than before her. He has mild dyspnea on exertion, and no residual edema fluid. Abdominal distention has been reduced. Current level of distention is probably baseline due to some degree of obesity. No alcohol consumption. No confusion. No bleeding. No dysuria. Patient still continues to perform self catheterizations for symptomatic BPH/ obstructive uropathy.\par \par Recent blood work again shows BUN of 73, and creatinine of 2.1. Any prior blood work had shown BUN of 65 and creatinine 1.9. Hepatitis profiles were negative and DEVAN, +1:320.

## 2020-04-29 ENCOUNTER — APPOINTMENT (OUTPATIENT)
Dept: UROLOGY | Facility: CLINIC | Age: 75
End: 2020-04-29

## 2020-06-17 ENCOUNTER — LABORATORY RESULT (OUTPATIENT)
Age: 75
End: 2020-06-17

## 2020-06-17 LAB
INR PPP: 2.51 RATIO
PT BLD: 29.4 SEC

## 2020-06-19 LAB
AFP-TM SERPL-MCNC: <1.8 NG/ML
ALBUMIN SERPL ELPH-MCNC: 4.4 G/DL
ALP BLD-CCNC: 110 U/L
ALT SERPL-CCNC: 22 U/L
ANION GAP SERPL CALC-SCNC: 16 MMOL/L
AST SERPL-CCNC: 25 U/L
BASOPHILS # BLD AUTO: 0.04 K/UL
BASOPHILS NFR BLD AUTO: 0.6 %
BILIRUB SERPL-MCNC: 1 MG/DL
BUN SERPL-MCNC: 72 MG/DL
CALCIUM SERPL-MCNC: 9.1 MG/DL
CHLORIDE SERPL-SCNC: 103 MMOL/L
CO2 SERPL-SCNC: 27 MMOL/L
CREAT SERPL-MCNC: 2.3 MG/DL
EOSINOPHIL # BLD AUTO: 0.33 K/UL
EOSINOPHIL NFR BLD AUTO: 5.2 %
GLUCOSE SERPL-MCNC: 97 MG/DL
HCT VFR BLD CALC: 47.9 %
HGB BLD-MCNC: 15.6 G/DL
IMM GRANULOCYTES NFR BLD AUTO: 0.5 %
LYMPHOCYTES # BLD AUTO: 0.55 K/UL
LYMPHOCYTES NFR BLD AUTO: 8.7 %
MAN DIFF?: NORMAL
MCHC RBC-ENTMCNC: 30.7 PG
MCHC RBC-ENTMCNC: 32.6 GM/DL
MCV RBC AUTO: 94.3 FL
MONOCYTES # BLD AUTO: 0.76 K/UL
MONOCYTES NFR BLD AUTO: 12.1 %
NEUTROPHILS # BLD AUTO: 4.58 K/UL
NEUTROPHILS NFR BLD AUTO: 72.9 %
PLATELET # BLD AUTO: NORMAL K/UL
POTASSIUM SERPL-SCNC: 4.2 MMOL/L
PROT SERPL-MCNC: 7.2 G/DL
RBC # BLD: 5.08 M/UL
RBC # FLD: 14.3 %
SODIUM SERPL-SCNC: 145 MMOL/L
WBC # FLD AUTO: 6.29 K/UL

## 2020-07-01 ENCOUNTER — APPOINTMENT (OUTPATIENT)
Dept: GASTROENTEROLOGY | Facility: CLINIC | Age: 75
End: 2020-07-01
Payer: MEDICARE

## 2020-07-01 VITALS
SYSTOLIC BLOOD PRESSURE: 121 MMHG | OXYGEN SATURATION: 99 % | DIASTOLIC BLOOD PRESSURE: 60 MMHG | WEIGHT: 215 LBS | HEART RATE: 76 BPM | HEIGHT: 71 IN | BODY MASS INDEX: 30.1 KG/M2 | RESPIRATION RATE: 14 BRPM | TEMPERATURE: 98.6 F

## 2020-07-01 DIAGNOSIS — Z80.0 FAMILY HISTORY OF MALIGNANT NEOPLASM OF DIGESTIVE ORGANS: ICD-10-CM

## 2020-07-01 PROCEDURE — 99214 OFFICE O/P EST MOD 30 MIN: CPT

## 2020-07-01 RX ORDER — BUMETANIDE 2 MG/1
2 TABLET ORAL
Refills: 0 | Status: DISCONTINUED | COMMUNITY
End: 2020-07-01

## 2020-07-01 RX ORDER — FAMOTIDINE 40 MG/1
40 TABLET, FILM COATED ORAL
Refills: 0 | Status: DISCONTINUED | COMMUNITY
End: 2020-07-01

## 2020-07-01 RX ORDER — VALSARTAN 160 MG/1
160 TABLET, COATED ORAL
Refills: 0 | Status: DISCONTINUED | COMMUNITY
End: 2020-07-01

## 2020-07-01 NOTE — HISTORY OF PRESENT ILLNESS
[FreeTextEntry1] : 74-year-old white male with history of decompensated cirrhosis, ascites, peripheral edema, renal insufficiency, pulmonary, hypertension, mitral fibrillation, on chronic anticoagulation with Coumadin. Patient was last seen about 3 months ago. Recent blood work again and dictated elevated BUN and creatinine of 72 and 2.3, respectively. Blood work was done 6/19/20. He has since been followed by renal and no urgent intervention was advised. There\par Patient had been called with the results of recent blood work about 2 weeks ago, and the dose of Bumex, was reduced from 2-1 mg per day. He has slightly increased since that time. Weight and increase from 2 1110-15 pounds. The patient has considerable abdominal corpulence, but no redevelopment of peripheral edema has been noted yet.\par No history of variceal bleeding. No abdominal pain. No weight loss. No confusion. While on Coumadin. Recent INR is 2.5.

## 2020-07-01 NOTE — ASSESSMENT
[FreeTextEntry1] : Chronic passive congestion of the liver from prior pulmonary hypertension, and CHF, now with overt decompensated cirrhosis. Adequate diuresis. Prerenal azotemia due to diuretics.\par Diuretic dose reduced to 1 mg, Bumex per day. Plan serial weights. Salt restriction. Repeat blood work in 10 weeks. GI office followup in 3 months.

## 2020-07-01 NOTE — PHYSICAL EXAM
[General Appearance - Alert] : alert [General Appearance - In No Acute Distress] : in no acute distress [Sclera] : the sclera and conjunctiva were normal [PERRL With Normal Accommodation] : pupils were equal in size, round, and reactive to light [Extraocular Movements] : extraocular movements were intact [Outer Ear] : the ears and nose were normal in appearance [Oropharynx] : the oropharynx was normal [Neck Appearance] : the appearance of the neck was normal [Neck Cervical Mass (___cm)] : no neck mass was observed [Jugular Venous Distention Increased] : there was no jugular-venous distention [Thyroid Diffuse Enlargement] : the thyroid was not enlarged [Thyroid Nodule] : there were no palpable thyroid nodules [] : no respiratory distress [Auscultation Breath Sounds / Voice Sounds] : lungs were clear to auscultation bilaterally [Heart Rate And Rhythm] : heart rate was normal and rhythm regular [Heart Sounds] : normal S1 and S2 [Heart Sounds Gallop] : no gallops [Murmurs] : no murmurs [Heart Sounds Pericardial Friction Rub] : no pericardial rub [No Rectal Mass] : no rectal mass [Normal Sphincter Tone] : normal sphincter tone [Internal Hemorrhoid] : no internal hemorrhoids [External Hemorrhoid] : no external hemorrhoids [Occult Blood Positive] : stool positive for occult blood [FreeTextEntry1] : +3 pedal edema extending to the thighs

## 2020-08-04 ENCOUNTER — APPOINTMENT (OUTPATIENT)
Dept: UROLOGY | Facility: CLINIC | Age: 75
End: 2020-08-04
Payer: MEDICARE

## 2020-08-04 VITALS — DIASTOLIC BLOOD PRESSURE: 74 MMHG | SYSTOLIC BLOOD PRESSURE: 132 MMHG | HEART RATE: 73 BPM | RESPIRATION RATE: 14 BRPM

## 2020-08-04 PROCEDURE — 99213 OFFICE O/P EST LOW 20 MIN: CPT

## 2020-08-04 NOTE — PHYSICAL EXAM
[General Appearance - Well Nourished] : well nourished [General Appearance - Well Developed] : well developed [Normal Appearance] : normal appearance [Well Groomed] : well groomed [General Appearance - In No Acute Distress] : no acute distress [Abdomen Tenderness] : non-tender [Abdomen Soft] : soft [Costovertebral Angle Tenderness] : no ~M costovertebral angle tenderness [Urethral Meatus] : meatus normal [Scrotum] : the scrotum was normal [Urinary Bladder Findings] : the bladder was normal on palpation [Testes Mass (___cm)] : there were no testicular masses [Respiration, Rhythm And Depth] : normal respiratory rhythm and effort [Edema] : no peripheral edema [] : no respiratory distress [Oriented To Time, Place, And Person] : oriented to person, place, and time [Exaggerated Use Of Accessory Muscles For Inspiration] : no accessory muscle use [Affect] : the affect was normal [Mood] : the mood was normal [Not Anxious] : not anxious [Normal Station and Gait] : the gait and station were normal for the patient's age [No Focal Deficits] : no focal deficits [No Palpable Adenopathy] : no palpable adenopathy

## 2020-08-06 NOTE — HISTORY OF PRESENT ILLNESS
[FreeTextEntry1] : Patient is a 74 year old male presents to the office today. Patient doing CIC 4x a day. Denies any hematuria or dysuria.  No abdominal or flank pain.  No difficulty with CIC.  Otherwise feels well.\par No other changes in medical hx.\par

## 2020-10-30 ENCOUNTER — LABORATORY RESULT (OUTPATIENT)
Age: 75
End: 2020-10-30

## 2020-10-30 LAB
ALBUMIN SERPL ELPH-MCNC: 4.1 G/DL
ALP BLD-CCNC: 115 U/L
ALT SERPL-CCNC: 16 U/L
ANION GAP SERPL CALC-SCNC: 12 MMOL/L
AST SERPL-CCNC: 24 U/L
BASOPHILS # BLD AUTO: 0.03 K/UL
BASOPHILS NFR BLD AUTO: 0.4 %
BILIRUB SERPL-MCNC: 1.1 MG/DL
BUN SERPL-MCNC: 48 MG/DL
CALCIUM SERPL-MCNC: 9.3 MG/DL
CHLORIDE SERPL-SCNC: 106 MMOL/L
CO2 SERPL-SCNC: 26 MMOL/L
CREAT SERPL-MCNC: 1.73 MG/DL
EOSINOPHIL # BLD AUTO: 0.22 K/UL
EOSINOPHIL NFR BLD AUTO: 3.2 %
GLUCOSE SERPL-MCNC: 105 MG/DL
HCT VFR BLD CALC: 47.3 %
HGB BLD-MCNC: 14.8 G/DL
IMM GRANULOCYTES NFR BLD AUTO: 0.3 %
INR PPP: 1.75 RATIO
LYMPHOCYTES # BLD AUTO: 0.51 K/UL
LYMPHOCYTES NFR BLD AUTO: 7.4 %
MAN DIFF?: NORMAL
MCHC RBC-ENTMCNC: 30.8 PG
MCHC RBC-ENTMCNC: 31.3 GM/DL
MCV RBC AUTO: 98.5 FL
MONOCYTES # BLD AUTO: 0.78 K/UL
MONOCYTES NFR BLD AUTO: 11.4 %
NEUTROPHILS # BLD AUTO: 5.31 K/UL
NEUTROPHILS NFR BLD AUTO: 77.3 %
PLATELET # BLD AUTO: NORMAL K/UL
POTASSIUM SERPL-SCNC: 4.5 MMOL/L
PROT SERPL-MCNC: 7.2 G/DL
PT BLD: 20.1 SEC
RBC # BLD: 4.8 M/UL
RBC # FLD: 15.7 %
SODIUM SERPL-SCNC: 144 MMOL/L
WBC # FLD AUTO: 6.87 K/UL

## 2020-11-13 ENCOUNTER — APPOINTMENT (OUTPATIENT)
Dept: GASTROENTEROLOGY | Facility: CLINIC | Age: 75
End: 2020-11-13
Payer: MEDICARE

## 2020-11-13 VITALS
RESPIRATION RATE: 14 BRPM | TEMPERATURE: 97.7 F | HEART RATE: 69 BPM | HEIGHT: 61 IN | OXYGEN SATURATION: 97 % | DIASTOLIC BLOOD PRESSURE: 78 MMHG | SYSTOLIC BLOOD PRESSURE: 159 MMHG | WEIGHT: 213 LBS | BODY MASS INDEX: 40.22 KG/M2

## 2020-11-13 DIAGNOSIS — K40.90 UNILATERAL INGUINAL HERNIA, W/OUT OBSTRUCTION OR GANGRENE, NOT SPECIFIED AS RECURRENT: ICD-10-CM

## 2020-11-13 PROCEDURE — 99214 OFFICE O/P EST MOD 30 MIN: CPT

## 2020-11-13 NOTE — ASSESSMENT
[FreeTextEntry1] : Lump on the anterior abdominal wall rather superficial location. Not clearly hernia or lymphadenopathy. No obvious trauma. No fascia defect.\par Plan CAT scan abdomen and pelvis without oral/IV contrast. Culture results. GI office followup in 3 months. Blood work prior to next visit. All medications remain unchanged.

## 2020-11-13 NOTE — HISTORY OF PRESENT ILLNESS
[FreeTextEntry1] : 75-year-old white male with history of hypertension hyperlipidemia a short fibrillation on chronic anticoagulation mitral regurgitation pulmonary hypertension followed by [facility]. Recent right heart catheterization. No recent upper endoscopy to assess for varices.\par Patient recalls that over the course of S4 months he is felt to be left-sided a lump on the anterior abdominal wall inferiorly. No change in size or shape. Not related to coughing or lifting or no change in size and shape. No prior history of hernia.

## 2020-11-20 ENCOUNTER — RESULT REVIEW (OUTPATIENT)
Age: 75
End: 2020-11-20

## 2020-11-20 ENCOUNTER — APPOINTMENT (OUTPATIENT)
Dept: CT IMAGING | Facility: CLINIC | Age: 75
End: 2020-11-20
Payer: MEDICARE

## 2020-11-20 ENCOUNTER — OUTPATIENT (OUTPATIENT)
Dept: OUTPATIENT SERVICES | Facility: HOSPITAL | Age: 75
LOS: 1 days | End: 2020-11-20
Payer: MEDICARE

## 2020-11-20 DIAGNOSIS — K40.90 UNILATERAL INGUINAL HERNIA, WITHOUT OBSTRUCTION OR GANGRENE, NOT SPECIFIED AS RECURRENT: ICD-10-CM

## 2020-11-20 PROCEDURE — 74176 CT ABD & PELVIS W/O CONTRAST: CPT

## 2020-11-20 PROCEDURE — 74176 CT ABD & PELVIS W/O CONTRAST: CPT | Mod: 26

## 2020-12-02 ENCOUNTER — NON-APPOINTMENT (OUTPATIENT)
Age: 75
End: 2020-12-02

## 2020-12-04 DIAGNOSIS — R22.9 LOCALIZED SWELLING, MASS AND LUMP, UNSPECIFIED: ICD-10-CM

## 2020-12-07 ENCOUNTER — NON-APPOINTMENT (OUTPATIENT)
Age: 75
End: 2020-12-07

## 2020-12-16 ENCOUNTER — OUTPATIENT (OUTPATIENT)
Dept: OUTPATIENT SERVICES | Facility: HOSPITAL | Age: 75
LOS: 1 days | End: 2020-12-16
Payer: MEDICARE

## 2020-12-16 ENCOUNTER — APPOINTMENT (OUTPATIENT)
Dept: MRI IMAGING | Facility: CLINIC | Age: 75
End: 2020-12-16
Payer: MEDICARE

## 2020-12-16 ENCOUNTER — APPOINTMENT (OUTPATIENT)
Dept: MRI IMAGING | Facility: CLINIC | Age: 75
End: 2020-12-16

## 2020-12-16 DIAGNOSIS — E66.9 OBESITY, UNSPECIFIED: ICD-10-CM

## 2020-12-16 PROCEDURE — 72197 MRI PELVIS W/O & W/DYE: CPT | Mod: 26

## 2020-12-16 PROCEDURE — A9585: CPT

## 2020-12-16 PROCEDURE — 72197 MRI PELVIS W/O & W/DYE: CPT

## 2021-03-10 ENCOUNTER — APPOINTMENT (OUTPATIENT)
Dept: UROLOGY | Facility: CLINIC | Age: 76
End: 2021-03-10
Payer: MEDICARE

## 2021-03-10 PROCEDURE — 99213 OFFICE O/P EST LOW 20 MIN: CPT

## 2021-03-10 NOTE — HISTORY OF PRESENT ILLNESS
[FreeTextEntry1] : Presents to the office today for follow up.  Patient doing CIC 4x a day. Denies any hematuria or dysuria.  No abdominal or flank pain.  No difficulty with CIC.  Otherwise feels well.\par No changes in medical history since last visit.

## 2021-03-10 NOTE — ASSESSMENT
[FreeTextEntry1] : Doing well. Continue CIC 3-4x daily as needed.\par No recent UTI.\par Follow up in 6 months.

## 2021-05-11 ENCOUNTER — LABORATORY RESULT (OUTPATIENT)
Age: 76
End: 2021-05-11

## 2021-05-11 LAB
AFP-TM SERPL-MCNC: <1.8 NG/ML
ALBUMIN SERPL ELPH-MCNC: 3.7 G/DL
ALP BLD-CCNC: 118 U/L
ALT SERPL-CCNC: 15 U/L
ANION GAP SERPL CALC-SCNC: 13 MMOL/L
AST SERPL-CCNC: 28 U/L
BASOPHILS # BLD AUTO: 0.02 K/UL
BASOPHILS NFR BLD AUTO: 0.4 %
BILIRUB SERPL-MCNC: 1.2 MG/DL
BUN SERPL-MCNC: 42 MG/DL
CALCIUM SERPL-MCNC: 8.6 MG/DL
CHLORIDE SERPL-SCNC: 106 MMOL/L
CO2 SERPL-SCNC: 26 MMOL/L
CREAT SERPL-MCNC: 1.82 MG/DL
EOSINOPHIL # BLD AUTO: 0.15 K/UL
EOSINOPHIL NFR BLD AUTO: 3.2 %
GLUCOSE SERPL-MCNC: 91 MG/DL
HCT VFR BLD CALC: 45.5 %
HGB BLD-MCNC: 14 G/DL
IMM GRANULOCYTES NFR BLD AUTO: 0.2 %
INR PPP: 3.28 RATIO
LYMPHOCYTES # BLD AUTO: 0.62 K/UL
LYMPHOCYTES NFR BLD AUTO: 13.1 %
MAN DIFF?: NORMAL
MCHC RBC-ENTMCNC: 30.4 PG
MCHC RBC-ENTMCNC: 30.8 GM/DL
MCV RBC AUTO: 98.9 FL
MONOCYTES # BLD AUTO: 0.62 K/UL
MONOCYTES NFR BLD AUTO: 13.1 %
NEUTROPHILS # BLD AUTO: 3.3 K/UL
NEUTROPHILS NFR BLD AUTO: 70 %
PLATELET # BLD AUTO: 94 K/UL
POTASSIUM SERPL-SCNC: 3.8 MMOL/L
PROT SERPL-MCNC: 7 G/DL
PT BLD: 36.6 SEC
RBC # BLD: 4.6 M/UL
RBC # FLD: 15.8 %
SODIUM SERPL-SCNC: 144 MMOL/L
WBC # FLD AUTO: 4.72 K/UL

## 2021-05-28 LAB
ANION GAP SERPL CALC-SCNC: 12 MMOL/L
BASOPHILS # BLD AUTO: 0.02 K/UL
BASOPHILS NFR BLD AUTO: 0.3 %
BUN SERPL-MCNC: 45 MG/DL
CALCIUM SERPL-MCNC: 8.9 MG/DL
CHLORIDE SERPL-SCNC: 108 MMOL/L
CO2 SERPL-SCNC: 23 MMOL/L
CREAT SERPL-MCNC: 1.61 MG/DL
EOSINOPHIL # BLD AUTO: 0.16 K/UL
EOSINOPHIL NFR BLD AUTO: 2.7 %
GLUCOSE SERPL-MCNC: 103 MG/DL
HCT VFR BLD CALC: 44 %
HGB BLD-MCNC: 13.3 G/DL
IMM GRANULOCYTES NFR BLD AUTO: 0.3 %
LYMPHOCYTES # BLD AUTO: 0.62 K/UL
LYMPHOCYTES NFR BLD AUTO: 10.6 %
MAN DIFF?: NORMAL
MCHC RBC-ENTMCNC: 30.2 GM/DL
MCHC RBC-ENTMCNC: 30.2 PG
MCV RBC AUTO: 99.8 FL
MONOCYTES # BLD AUTO: 0.82 K/UL
MONOCYTES NFR BLD AUTO: 14 %
NEUTROPHILS # BLD AUTO: 4.2 K/UL
NEUTROPHILS NFR BLD AUTO: 72.1 %
PLATELET # BLD AUTO: 133 K/UL
POTASSIUM SERPL-SCNC: 4 MMOL/L
RBC # BLD: 4.41 M/UL
RBC # FLD: 16.9 %
SODIUM SERPL-SCNC: 144 MMOL/L
WBC # FLD AUTO: 5.84 K/UL

## 2021-06-01 ENCOUNTER — APPOINTMENT (OUTPATIENT)
Dept: GASTROENTEROLOGY | Facility: CLINIC | Age: 76
End: 2021-06-01
Payer: MEDICARE

## 2021-06-01 VITALS
HEIGHT: 61 IN | OXYGEN SATURATION: 98 % | BODY MASS INDEX: 41.35 KG/M2 | SYSTOLIC BLOOD PRESSURE: 140 MMHG | HEART RATE: 72 BPM | WEIGHT: 219 LBS | TEMPERATURE: 98.6 F | DIASTOLIC BLOOD PRESSURE: 78 MMHG | RESPIRATION RATE: 14 BRPM

## 2021-06-01 DIAGNOSIS — K63.5 POLYP OF COLON: ICD-10-CM

## 2021-06-01 DIAGNOSIS — E66.9 OBESITY, UNSPECIFIED: ICD-10-CM

## 2021-06-01 DIAGNOSIS — I82.409 ACUTE EMBOLISM AND THROMBOSIS OF UNSPECIFIED DEEP VEINS OF UNSPECIFIED LOWER EXTREMITY: ICD-10-CM

## 2021-06-01 PROCEDURE — 99214 OFFICE O/P EST MOD 30 MIN: CPT

## 2021-06-01 PROCEDURE — 82272 OCCULT BLD FECES 1-3 TESTS: CPT

## 2021-06-01 RX ORDER — POTASSIUM CHLORIDE 1500 MG/1
20 TABLET, EXTENDED RELEASE ORAL
Qty: 270 | Refills: 0 | Status: DISCONTINUED | COMMUNITY
Start: 2017-08-21 | End: 2021-06-01

## 2021-06-01 RX ORDER — CEFADROXIL 500 MG/1
500 CAPSULE ORAL
Qty: 10 | Refills: 0 | Status: DISCONTINUED | COMMUNITY
Start: 2021-01-29

## 2021-06-01 RX ORDER — BETAMETHASONE DIPROPIONATE 0.5 MG/G
0.05 CREAM, AUGMENTED TOPICAL
Qty: 50 | Refills: 0 | Status: DISCONTINUED | COMMUNITY
Start: 2020-07-21 | End: 2021-06-01

## 2021-06-01 RX ORDER — IMIQUIMOD 50 MG/G
5 CREAM TOPICAL
Qty: 24 | Refills: 0 | Status: DISCONTINUED | COMMUNITY
Start: 2020-07-21 | End: 2021-06-01

## 2021-06-01 RX ORDER — WARFARIN SODIUM 7.5 MG/1
7.5 TABLET ORAL
Refills: 0 | Status: DISCONTINUED | COMMUNITY
End: 2021-06-01

## 2021-06-01 RX ORDER — TORSEMIDE 20 MG/1
20 TABLET ORAL
Qty: 90 | Refills: 0 | Status: DISCONTINUED | COMMUNITY
Start: 2020-08-03 | End: 2021-06-01

## 2021-06-01 RX ORDER — WARFARIN 5 MG/1
5 TABLET ORAL
Qty: 135 | Refills: 0 | Status: DISCONTINUED | COMMUNITY
Start: 2020-07-27 | End: 2021-06-01

## 2021-06-01 RX ORDER — CEFPODOXIME PROXETIL 100 MG/1
100 TABLET, FILM COATED ORAL
Qty: 20 | Refills: 0 | Status: DISCONTINUED | COMMUNITY
Start: 2021-02-08

## 2021-06-01 NOTE — ASSESSMENT
[FreeTextEntry1] : Imaging has shown cirrhosis.  No evidence for ascites or overt GI bleeding or hepatic encephalopathy.  Blood work showing modest thrombocytopenia.  FTs normal.  Minor prerenal azotemia likely diuretic related.  Physical exam remarkable for brown stool occult blood positive.  External hemorrhoids are present.  No evidence for recent major GI bleeding.\par Patient should be screened for esophageal varices and if large banded.  It would be reasonable to proceed with upper endoscopy prior to any VALERIA procedure.  The therefore upper endoscopy will be arranged via [facility] on an ambulatory basis.  She will need to stop the Eliquis 3 days prior to the procedure.  Blood work  1 day prior to the procedure has been requested.  The procedure, its risks benefits and prep, were reviewed with the patient, who understands and is agreeable to proceed.  If banding is performed then cardiac procedures may need to be postponed.  Cardiac clearance to be arranged.   ASA #3.  Mallampati #2.  Arrangements made.  Results to follow.

## 2021-06-01 NOTE — HISTORY OF PRESENT ILLNESS
[FreeTextEntry1] : 75-year-old white male with history of hypertension hyperlipidemia gout pulmonary hypertension CHF chronic renal insufficiency BPH requiring self-catheterization 4 times per day.  Patient was last seen on 11/13/2020.  At that time he was on anticoagulation for chronic atrial fibrillation with Eliquis and had sustained abdominal wall hematomas.  They were being followed by serial imaging.  Cirrhosis was noted and hepatitis profiles were negative.  DEVAN was positive at 1 2/3/2020.  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  Last colonoscopy was in 2019 where a diminutive polyp was removed from the right colon.  Hyperplastic.  Patient was referred for evaluation of black stools via cardiologist.  Unclear etiology.  No overt GI bleeding noted.  Repeat blood work just recently done this past weekend showed a hemoglobin of 13 and a platelet count of 133 with a BUN of 45 and a creatinine 1.6 and LFTs are normal.  Alpha-fetoprotein less than 1.8 no prior upper endoscopy for screening for varices.  Continues to feel well and dark stools have resolved.  Patient has chronic atrial fibrillation and has been evaluated by a lecture physiology cardiologist at [facility].  He is currently planned for a ablation of the atrial fibrillation on 6/25.  I believe a VALERIA is scheduled for 6/23.\par \par

## 2021-06-06 LAB
ALBUMIN SERPL ELPH-MCNC: 3.1 G/DL
ALP BLD-CCNC: 117 U/L
ALT SERPL-CCNC: 19 U/L
ANION GAP SERPL CALC-SCNC: 9 MMOL/L
AST SERPL-CCNC: 28 U/L
BASOPHILS # BLD AUTO: 0.01 K/UL
BASOPHILS NFR BLD AUTO: 0.2 %
BILIRUB SERPL-MCNC: 1 MG/DL
BUN SERPL-MCNC: 47 MG/DL
CALCIUM SERPL-MCNC: 8.1 MG/DL
CHLORIDE SERPL-SCNC: 105 MMOL/L
CO2 SERPL-SCNC: 29 MMOL/L
CREAT SERPL-MCNC: 1.66 MG/DL
EOSINOPHIL # BLD AUTO: 0.15 K/UL
EOSINOPHIL NFR BLD AUTO: 2.8 %
GLUCOSE SERPL-MCNC: 126 MG/DL
HCT VFR BLD CALC: 42.5 %
HGB BLD-MCNC: 13.1 G/DL
IMM GRANULOCYTES NFR BLD AUTO: 0.4 %
INR PPP: 1.7 RATIO
LYMPHOCYTES # BLD AUTO: 0.61 K/UL
LYMPHOCYTES NFR BLD AUTO: 11.5 %
MAN DIFF?: NO
MCHC RBC-ENTMCNC: 30.7 PG
MCHC RBC-ENTMCNC: 30.8 GM/DL
MCV RBC AUTO: 99.5 FL
MONOCYTES # BLD AUTO: 0.61 K/UL
MONOCYTES NFR BLD AUTO: 11.5 %
NEUTROPHILS # BLD AUTO: 3.89 K/UL
NEUTROPHILS NFR BLD AUTO: 73.6 %
PLATELET # BLD AUTO: 72 K/UL
POTASSIUM SERPL-SCNC: 3.8 MMOL/L
PROT SERPL-MCNC: 5.7 G/DL
PT BLD: 19.2 SEC
RBC # BLD: 4.27 M/UL
RBC # FLD: 16.4 %
SODIUM SERPL-SCNC: 143 MMOL/L
WBC # FLD AUTO: 5.29 K/UL

## 2021-06-07 ENCOUNTER — RESULT REVIEW (OUTPATIENT)
Age: 76
End: 2021-06-07

## 2021-06-07 ENCOUNTER — APPOINTMENT (OUTPATIENT)
Dept: GASTROENTEROLOGY | Facility: HOSPITAL | Age: 76
End: 2021-06-07

## 2021-06-07 ENCOUNTER — OUTPATIENT (OUTPATIENT)
Dept: OUTPATIENT SERVICES | Facility: HOSPITAL | Age: 76
LOS: 1 days | End: 2021-06-07
Payer: MEDICARE

## 2021-06-07 DIAGNOSIS — R10.13 EPIGASTRIC PAIN: ICD-10-CM

## 2021-06-07 DIAGNOSIS — R18.8 UNSPECIFIED CIRRHOSIS OF LIVER: ICD-10-CM

## 2021-06-07 DIAGNOSIS — K74.60 UNSPECIFIED CIRRHOSIS OF LIVER: ICD-10-CM

## 2021-06-07 DIAGNOSIS — I27.20 PULMONARY HYPERTENSION, UNSPECIFIED: ICD-10-CM

## 2021-06-07 PROCEDURE — 88305 TISSUE EXAM BY PATHOLOGIST: CPT

## 2021-06-07 PROCEDURE — 88342 IMHCHEM/IMCYTCHM 1ST ANTB: CPT | Mod: 26

## 2021-06-07 PROCEDURE — 43239 EGD BIOPSY SINGLE/MULTIPLE: CPT

## 2021-06-07 PROCEDURE — 88305 TISSUE EXAM BY PATHOLOGIST: CPT | Mod: 26

## 2021-06-07 PROCEDURE — 88342 IMHCHEM/IMCYTCHM 1ST ANTB: CPT

## 2021-06-07 NOTE — ASSESSMENT
[FreeTextEntry1] : No varices therefore GI cleared for cardiac procedures and anticoagulation if needed.  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  GI OV in 1 month.  Anti Reflux diet.  Chronic PPI.

## 2021-06-07 NOTE — PROCEDURE
[With Biopsy] : with biopsy [GI Bleeding] : GI bleeding [X-ray Abnormal] : x-ray abnormal [Varices] : varices [Procedure Explained] : The procedure was explained [Allergies Reviewed] : allergies reviewed. [Risks] : Risks [Benefits] : benefits [Alternatives] : alternatives [Consent Obtained] : written consent was obtained prior to the procedure and is detailed in the patient's record [Patient] : the patient [Automated Blood Pressure Cuff] : automated blood pressure cuff [Cardiac Monitor] : cardiac monitor [Pulse Oximeter] : pulse oximeter [Propofol ___ mg IV] : Propofol [unfilled] ~Umg intravenously [3] : 3 [Muse's] : Muse's [Biopsy] : biopsy [Normal] : Normal [Sent to Pathology] : was sent to pathology for analysis [Tolerated Well] : the patient tolerated the procedure well [Vital Signs Stable] : the vital signs were stable [de-identified] : Cirrhosis; r/o varices.   [de-identified] : Disrupted Z line at 47 cm with 2 small 2 mm islands of gastric mucosa above the GE junction c/w SSBE.  No varices or HH or EE or EOE.  4 biopsies at GEJ were done.  Good hemostasis observed.  No Banding or other intervention.   [de-identified] : No laxity at GEJ.  No Gastric varices.   [de-identified] : Moderate PHG.  No gastric varices.   [de-identified] : Moderate PHG.  No varices.  2 random biopsies were done.   [de-identified] : Moderate PHG;  2 random biopsies were done.     [de-identified] : Moderate diffuse PHG.  No Esophageal or gastric Varices.  Probable short segment BE.

## 2021-06-09 LAB — SURGICAL PATHOLOGY STUDY: SIGNIFICANT CHANGE UP

## 2021-07-22 ENCOUNTER — APPOINTMENT (OUTPATIENT)
Dept: GASTROENTEROLOGY | Facility: CLINIC | Age: 76
End: 2021-07-22
Payer: MEDICARE

## 2021-07-22 VITALS
DIASTOLIC BLOOD PRESSURE: 86 MMHG | TEMPERATURE: 97.2 F | HEART RATE: 102 BPM | SYSTOLIC BLOOD PRESSURE: 115 MMHG | WEIGHT: 216 LBS | OXYGEN SATURATION: 98 % | RESPIRATION RATE: 14 BRPM | BODY MASS INDEX: 40.78 KG/M2 | HEIGHT: 61 IN

## 2021-07-22 PROCEDURE — 99214 OFFICE O/P EST MOD 30 MIN: CPT

## 2021-07-22 NOTE — ASSESSMENT
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  Well compensated cirrhosis without ascites of unclear etiology with negative cross-sectional imaging of his liver done in November or December 2020.\par \par Recommendations: Patient's acid suppression therapy was changed from famotidine 20 mg twice daily to pantoprazole 40 mg once daily because of the above newly diagnosed Muse's esophagus.  He is to return here in 3 months time for follow-up evaluation and appeared to understand all of the above instructions, information, and management plan.  He will likely require upper endoscopy in 1 year and then every 1 to 2 years thereafter both for variceal screening and for Muse's dysplasia surveillance.

## 2021-07-22 NOTE — HISTORY OF PRESENT ILLNESS
[None] : had no significant interval events [Heartburn] : stable heartburn [Nausea] : denies nausea [Vomiting] : denies vomiting [Diarrhea] : denies diarrhea [Constipation] : denies constipation [Yellow Skin Or Eyes (Jaundice)] : denies jaundice [Abdominal Pain] : denies abdominal pain [Abdominal Swelling] : denies abdominal swelling [Rectal Pain] : denies rectal pain [GERD] : gastroesophageal reflux disease [Cholelithiasis] : cholelithiasis [Alcohol Abuse] : alcohol abuse [Abdominal Surgery] : abdominal surgery [Appendectomy] : appendectomy [Cholecystectomy] : cholecystectomy [Wt Gain ___ Lbs] : no recent weight gain [Wt Loss ___ Lbs] : no recent weight loss [Hiatus Hernia] : no hiatus hernia [Peptic Ulcer Disease] : no peptic ulcer disease [Pancreatitis] : no pancreatitis [Kidney Stone] : no kidney stone [Inflammatory Bowel Disease] : no inflammatory bowel disease [Irritable Bowel Syndrome] : no irritable bowel syndrome [Diverticulitis] : no diverticulitis [Malignancy] : no malignancy [de-identified] : June 7, 2021 [de-identified] : EGD with biopsy [de-identified] : Patient presents for follow-up evaluation of cirrhosis possibly alcohol related presently without ascites or symptoms and is well compensated status post a recent upper endoscopy with biopsy done June 7, 2021 for variceal screening which showed no evidence of esophageal or gastric varices but did show reflux esophagitis which when biopsied was positive for Muse's intestinal metaplasia without dysplasia.  Gastric biopsies were negative for H. pylori and he is presently on famotidine 20 mg twice daily for reflux.  He presently has no upper or lower GI symptoms or complaints and denies abdominal pain or distention or jaundice or nausea or vomiting or hematemesis or melena or anorexia or fatigue.  He had recent cardiac ablation for atrial fibrillation and is presently on Eliquis 5 mg twice daily.

## 2021-07-22 NOTE — PHYSICAL EXAM
[General Appearance - Alert] : alert [General Appearance - In No Acute Distress] : in no acute distress [General Appearance - Well Nourished] : well nourished [General Appearance - Well Developed] : well developed [General Appearance - Well-Appearing] : healthy appearing [Sclera] : the sclera and conjunctiva were normal [PERRL With Normal Accommodation] : pupils were equal in size, round, and reactive to light [Extraocular Movements] : extraocular movements were intact [Outer Ear] : the ears and nose were normal in appearance [Examination Of The Oral Cavity] : the lips and gums were normal [Oropharynx] : the oropharynx was normal [Neck Appearance] : the appearance of the neck was normal [Neck Cervical Mass (___cm)] : no neck mass was observed [Jugular Venous Distention Increased] : there was no jugular-venous distention [Thyroid Diffuse Enlargement] : the thyroid was not enlarged [Thyroid Nodule] : there were no palpable thyroid nodules [Auscultation Breath Sounds / Voice Sounds] : lungs were clear to auscultation bilaterally [Heart Rate And Rhythm] : heart rate was normal and rhythm regular [Heart Sounds] : normal S1 and S2 [Heart Sounds Gallop] : no gallops [Murmurs] : no murmurs [Heart Sounds Pericardial Friction Rub] : no pericardial rub [Bowel Sounds] : normal bowel sounds [Abdomen Soft] : soft [Abdomen Tenderness] : non-tender [] : no hepato-splenomegaly [Abdomen Mass (___ Cm)] : no abdominal mass palpated [No CVA Tenderness] : no ~M costovertebral angle tenderness [Abnormal Walk] : normal gait [Musculoskeletal - Swelling] : no joint swelling seen [Skin Color & Pigmentation] : normal skin color and pigmentation [Skin Turgor] : normal skin turgor [Oriented To Time, Place, And Person] : oriented to person, place, and time [FreeTextEntry1] : Deferred at this time

## 2021-09-02 ENCOUNTER — RX CHANGE (OUTPATIENT)
Age: 76
End: 2021-09-02

## 2021-09-13 ENCOUNTER — APPOINTMENT (OUTPATIENT)
Dept: UROLOGY | Facility: CLINIC | Age: 76
End: 2021-09-13
Payer: MEDICARE

## 2021-09-13 DIAGNOSIS — I48.91 UNSPECIFIED ATRIAL FIBRILLATION: ICD-10-CM

## 2021-09-13 PROCEDURE — 99213 OFFICE O/P EST LOW 20 MIN: CPT

## 2021-09-13 RX ORDER — FINASTERIDE 5 MG/1
5 TABLET, FILM COATED ORAL
Qty: 90 | Refills: 1 | Status: COMPLETED | COMMUNITY
End: 2021-09-13

## 2021-09-13 RX ORDER — METOPROLOL SUCCINATE 25 MG/1
25 TABLET, EXTENDED RELEASE ORAL
Qty: 30 | Refills: 0 | Status: DISCONTINUED | COMMUNITY
Start: 2021-08-21

## 2021-09-13 RX ORDER — APIXABAN 5 MG/1
5 TABLET, FILM COATED ORAL
Qty: 60 | Refills: 0 | Status: COMPLETED | COMMUNITY
Start: 2021-05-10 | End: 2021-09-13

## 2021-09-20 NOTE — HISTORY OF PRESENT ILLNESS
[FreeTextEntry1] : Presents to the office today for follow up.  \par History of urinary retention, performing CIC x 4. Denies any gross hematuria. Denies any dysuria. \par History of TURP 12/2018\par Denies nocturia.\par Denies any difficulty with catheterization.\par Currently on finasteride 5 mg daily.

## 2021-09-20 NOTE — ASSESSMENT
[FreeTextEntry1] : Will discontinue finasteride 5 mg daily.\par Continue CIC.\par Follow up in one year

## 2021-10-05 ENCOUNTER — APPOINTMENT (OUTPATIENT)
Dept: GASTROENTEROLOGY | Facility: CLINIC | Age: 76
End: 2021-10-05

## 2021-12-15 ENCOUNTER — APPOINTMENT (OUTPATIENT)
Dept: UROLOGY | Facility: CLINIC | Age: 76
End: 2021-12-15
Payer: MEDICARE

## 2021-12-15 VITALS
OXYGEN SATURATION: 92 % | HEIGHT: 71 IN | DIASTOLIC BLOOD PRESSURE: 52 MMHG | BODY MASS INDEX: 30.52 KG/M2 | HEART RATE: 61 BPM | SYSTOLIC BLOOD PRESSURE: 118 MMHG | WEIGHT: 218 LBS

## 2021-12-15 DIAGNOSIS — N45.1 EPIDIDYMITIS: ICD-10-CM

## 2021-12-15 PROCEDURE — 99214 OFFICE O/P EST MOD 30 MIN: CPT

## 2021-12-15 NOTE — PHYSICAL EXAM
[General Appearance - Well Developed] : well developed [General Appearance - Well Nourished] : well nourished [Normal Appearance] : normal appearance [Well Groomed] : well groomed [General Appearance - In No Acute Distress] : no acute distress [Edema] : no peripheral edema [Respiration, Rhythm And Depth] : normal respiratory rhythm and effort [Exaggerated Use Of Accessory Muscles For Inspiration] : no accessory muscle use [Abdomen Soft] : soft [Abdomen Tenderness] : non-tender [Costovertebral Angle Tenderness] : no ~M costovertebral angle tenderness [Urethral Meatus] : meatus normal [Urinary Bladder Findings] : the bladder was normal on palpation [Scrotum] : the scrotum was normal [Testes Mass (___cm)] : there were no testicular masses [No Prostate Nodules] : no prostate nodules [FreeTextEntry1] : Left orchitis and epididymitis is noted [Normal Station and Gait] : the gait and station were normal for the patient's age [] : no rash [No Focal Deficits] : no focal deficits [Oriented To Time, Place, And Person] : oriented to person, place, and time [Affect] : the affect was normal [Mood] : the mood was normal [Not Anxious] : not anxious [No Palpable Adenopathy] : no palpable adenopathy

## 2021-12-15 NOTE — REVIEW OF SYSTEMS
[Chills] : chills [Dry Eyes] : dryness of the eyes [Loss of interest] : loss of interest in sexual activity [Wake up at night to urinate  How many times?  ___] : wakes up to urinate [unfilled] times during the night [Easy Bleeding] : a tendency for easy bleeding [Easy Bruising] : a tendency for easy bruising [see HPI] : see HPI [Negative] : Heme/Lymph [FreeTextEntry2] : high blood pressure

## 2021-12-15 NOTE — END OF VISIT
[FreeTextEntry3] : I prescribed ciprofloxacin and advised him to elevate the scrotum on a rolled towel when resting and to soak in a bathtub at least twice each day and hot water.  A scrotal sonogram is ordered and he will follow-up in 2 weeks or sooner with any problems

## 2021-12-15 NOTE — HISTORY OF PRESENT ILLNESS
[FreeTextEntry1] : This patient is on intermittent catheterization and over the past several days to developed left orchialgia.

## 2021-12-16 LAB
APPEARANCE: ABNORMAL
BACTERIA: NEGATIVE
BILIRUBIN URINE: NEGATIVE
BLOOD URINE: NEGATIVE
COLOR: YELLOW
GLUCOSE QUALITATIVE U: NEGATIVE
HYALINE CASTS: 4 /LPF
KETONES URINE: NEGATIVE
LEUKOCYTE ESTERASE URINE: ABNORMAL
MICROSCOPIC-UA: NORMAL
NITRITE URINE: POSITIVE
PH URINE: 6
PROTEIN URINE: NEGATIVE
RED BLOOD CELLS URINE: 3 /HPF
SPECIFIC GRAVITY URINE: 1.01
SQUAMOUS EPITHELIAL CELLS: 1 /HPF
UROBILINOGEN URINE: NORMAL
WHITE BLOOD CELLS URINE: 109 /HPF

## 2021-12-18 LAB — URINE CYTOLOGY: NORMAL

## 2021-12-21 ENCOUNTER — APPOINTMENT (OUTPATIENT)
Dept: ULTRASOUND IMAGING | Facility: CLINIC | Age: 76
End: 2021-12-21
Payer: MEDICARE

## 2021-12-21 ENCOUNTER — OUTPATIENT (OUTPATIENT)
Dept: OUTPATIENT SERVICES | Facility: HOSPITAL | Age: 76
LOS: 1 days | End: 2021-12-21
Payer: MEDICARE

## 2021-12-21 DIAGNOSIS — N45.2 ORCHITIS: ICD-10-CM

## 2021-12-21 PROCEDURE — 93975 VASCULAR STUDY: CPT

## 2021-12-21 PROCEDURE — 93975 VASCULAR STUDY: CPT | Mod: 26

## 2021-12-29 ENCOUNTER — APPOINTMENT (OUTPATIENT)
Dept: UROLOGY | Facility: CLINIC | Age: 76
End: 2021-12-29
Payer: MEDICARE

## 2021-12-29 DIAGNOSIS — N45.2 ORCHITIS: ICD-10-CM

## 2021-12-29 PROCEDURE — 99213 OFFICE O/P EST LOW 20 MIN: CPT

## 2021-12-29 NOTE — REVIEW OF SYSTEMS
[Chills] : chills [Dry Eyes] : dryness of the eyes [see HPI] : see HPI [Loss of interest] : loss of interest in sexual activity [Wake up at night to urinate  How many times?  ___] : wakes up to urinate [unfilled] times during the night [Easy Bleeding] : a tendency for easy bleeding [Easy Bruising] : a tendency for easy bruising [Negative] : Heme/Lymph [FreeTextEntry2] : high blood pressure

## 2021-12-29 NOTE — PHYSICAL EXAM
[General Appearance - Well Developed] : well developed [General Appearance - Well Nourished] : well nourished [Normal Appearance] : normal appearance [Well Groomed] : well groomed [General Appearance - In No Acute Distress] : no acute distress [Abdomen Soft] : soft [Abdomen Tenderness] : non-tender [Costovertebral Angle Tenderness] : no ~M costovertebral angle tenderness [Urethral Meatus] : meatus normal [Urinary Bladder Findings] : the bladder was normal on palpation [Scrotum] : the scrotum was normal [Testes Mass (___cm)] : there were no testicular masses [No Prostate Nodules] : no prostate nodules [FreeTextEntry1] : Normal exam [Edema] : no peripheral edema [] : no respiratory distress [Respiration, Rhythm And Depth] : normal respiratory rhythm and effort [Exaggerated Use Of Accessory Muscles For Inspiration] : no accessory muscle use [Oriented To Time, Place, And Person] : oriented to person, place, and time [Affect] : the affect was normal [Mood] : the mood was normal [Not Anxious] : not anxious [Normal Station and Gait] : the gait and station were normal for the patient's age [No Focal Deficits] : no focal deficits [No Palpable Adenopathy] : no palpable adenopathy

## 2021-12-29 NOTE — HISTORY OF PRESENT ILLNESS
[FreeTextEntry1] : This patient was treated for orchitis.  He notes resolution of symptoms completely

## 2022-01-13 ENCOUNTER — NON-APPOINTMENT (OUTPATIENT)
Age: 77
End: 2022-01-13

## 2022-01-24 ENCOUNTER — LABORATORY RESULT (OUTPATIENT)
Age: 77
End: 2022-01-24

## 2022-01-25 ENCOUNTER — OUTPATIENT (OUTPATIENT)
Dept: OUTPATIENT SERVICES | Facility: HOSPITAL | Age: 77
LOS: 1 days | End: 2022-01-25
Payer: MEDICARE

## 2022-01-25 ENCOUNTER — APPOINTMENT (OUTPATIENT)
Dept: ULTRASOUND IMAGING | Facility: CLINIC | Age: 77
End: 2022-01-25
Payer: MEDICARE

## 2022-01-25 DIAGNOSIS — K74.60 UNSPECIFIED CIRRHOSIS OF LIVER: ICD-10-CM

## 2022-01-25 LAB
AFP-TM SERPL-MCNC: <1.8 NG/ML
ALBUMIN SERPL ELPH-MCNC: 2.7 G/DL
ALP BLD-CCNC: 75 U/L
ALT SERPL-CCNC: 12 U/L
ANION GAP SERPL CALC-SCNC: 11 MMOL/L
AST SERPL-CCNC: 18 U/L
BASOPHILS # BLD AUTO: 0.06 K/UL
BASOPHILS NFR BLD AUTO: 0.9 %
BILIRUB SERPL-MCNC: 0.8 MG/DL
BUN SERPL-MCNC: 55 MG/DL
CALCIUM SERPL-MCNC: 8.1 MG/DL
CHLORIDE SERPL-SCNC: 106 MMOL/L
CO2 SERPL-SCNC: 26 MMOL/L
CREAT SERPL-MCNC: 2.36 MG/DL
EOSINOPHIL # BLD AUTO: 0 K/UL
EOSINOPHIL NFR BLD AUTO: 0 %
GLUCOSE SERPL-MCNC: 94 MG/DL
HCT VFR BLD CALC: 42.9 %
HGB BLD-MCNC: 13 G/DL
INR PPP: 1.17 RATIO
LYMPHOCYTES # BLD AUTO: 0.35 K/UL
LYMPHOCYTES NFR BLD AUTO: 5.2 %
MAN DIFF?: NORMAL
MCHC RBC-ENTMCNC: 29.5 PG
MCHC RBC-ENTMCNC: 30.3 GM/DL
MCV RBC AUTO: 97.3 FL
MONOCYTES # BLD AUTO: 0.47 K/UL
MONOCYTES NFR BLD AUTO: 6.9 %
NEUTROPHILS # BLD AUTO: 5.92 K/UL
NEUTROPHILS NFR BLD AUTO: 87 %
PLATELET # BLD AUTO: 133 K/UL
POTASSIUM SERPL-SCNC: 4.6 MMOL/L
PROT SERPL-MCNC: 5.3 G/DL
PT BLD: 13.7 SEC
RBC # BLD: 4.41 M/UL
RBC # FLD: 15.6 %
SODIUM SERPL-SCNC: 143 MMOL/L
WBC # FLD AUTO: 6.81 K/UL

## 2022-01-25 PROCEDURE — 76700 US EXAM ABDOM COMPLETE: CPT

## 2022-01-25 PROCEDURE — 76700 US EXAM ABDOM COMPLETE: CPT | Mod: 26

## 2022-02-01 ENCOUNTER — APPOINTMENT (OUTPATIENT)
Dept: GASTROENTEROLOGY | Facility: CLINIC | Age: 77
End: 2022-02-01
Payer: MEDICARE

## 2022-02-01 VITALS
HEART RATE: 60 BPM | TEMPERATURE: 98 F | BODY MASS INDEX: 31.08 KG/M2 | SYSTOLIC BLOOD PRESSURE: 110 MMHG | WEIGHT: 222 LBS | HEIGHT: 71 IN | RESPIRATION RATE: 16 BRPM | OXYGEN SATURATION: 96 % | DIASTOLIC BLOOD PRESSURE: 70 MMHG

## 2022-02-01 PROCEDURE — 99213 OFFICE O/P EST LOW 20 MIN: CPT

## 2022-02-01 RX ORDER — FAMOTIDINE 20 MG/1
20 TABLET, FILM COATED ORAL
Refills: 0 | Status: DISCONTINUED | COMMUNITY
End: 2022-02-01

## 2022-02-01 RX ORDER — VALSARTAN 320 MG/1
320 TABLET, COATED ORAL
Refills: 0 | Status: DISCONTINUED | COMMUNITY
End: 2022-02-01

## 2022-02-01 RX ORDER — CIPROFLOXACIN HYDROCHLORIDE 500 MG/1
500 TABLET, FILM COATED ORAL
Qty: 28 | Refills: 0 | Status: DISCONTINUED | COMMUNITY
Start: 2021-12-15 | End: 2022-02-01

## 2022-02-01 NOTE — HISTORY OF PRESENT ILLNESS
[FreeTextEntry1] : 76-year-old white male with history of cirrhosis secondary to chronic alcohol abuse.  Atrial fibrillation on Coumadin thrombocytopenia status post ablation in 7/21 and history of hyperlipidemia portal hypertension BPH essential hypertension chronic renal insufficiency hypothyroidism and CHF.  Currently on EPI medication for recently demonstrated long segment Muse's esophagus.  Last EGD done 6/21 showed Muse's esophagus in the distal esophagus confirmed by sampling.  No dysplasia.  Short segment Muse's esophagus found.  Patient has maintained compliance on chronic pantoprazole therapy 40 mg a day.  Modest alcohol consumption persists.  A. fib remaining on Coumadin.  Thrombocytopenia persists.  LFTs show minor elevations of alk phos to around 200 range and transaminitis has resolved.  Remains compliant with intermittent self-catheterization for the past multiple months.\par \par Remains minimally symptomatic.  Modest heartburn and regurgitation despite extensive medical therapy.  \par Compliant with PPI medication(Pantoprazole) for the Muse's esophagus.  Currently on pantoprazole 40 mg p.o. daily.  Minor leg edema noted.  No new renal insufficiency.  Recent sonogram of the abdomen.  Eventual repeat endoscopy for confirmation of Muse's esophagus.  GI office reevaluation here in 3 to 4 months.

## 2022-06-27 LAB
AFP-TM SERPL-MCNC: <1.8 NG/ML
ALBUMIN SERPL ELPH-MCNC: 3.8 G/DL
ALP BLD-CCNC: 97 U/L
ALT SERPL-CCNC: 8 U/L
ANION GAP SERPL CALC-SCNC: 15 MMOL/L
AST SERPL-CCNC: 17 U/L
BILIRUB SERPL-MCNC: 0.9 MG/DL
BUN SERPL-MCNC: 94 MG/DL
CALCIUM SERPL-MCNC: 9.2 MG/DL
CHLORIDE SERPL-SCNC: 104 MMOL/L
CO2 SERPL-SCNC: 24 MMOL/L
CREAT SERPL-MCNC: 3.01 MG/DL
EGFR: 21 ML/MIN/1.73M2
GLUCOSE SERPL-MCNC: 102 MG/DL
INR PPP: 1.22 RATIO
POTASSIUM SERPL-SCNC: 4.5 MMOL/L
PROT SERPL-MCNC: 7.4 G/DL
PT BLD: 14.3 SEC
SODIUM SERPL-SCNC: 144 MMOL/L

## 2022-06-28 LAB
BASOPHILS # BLD AUTO: 0.03 K/UL
BASOPHILS NFR BLD AUTO: 0.5 %
EOSINOPHIL # BLD AUTO: 0.26 K/UL
EOSINOPHIL NFR BLD AUTO: 4.4 %
HCT VFR BLD CALC: 36.1 %
HGB BLD-MCNC: 11 G/DL
IMM GRANULOCYTES NFR BLD AUTO: 0.2 %
LYMPHOCYTES # BLD AUTO: 0.4 K/UL
LYMPHOCYTES NFR BLD AUTO: 6.7 %
MAN DIFF?: NORMAL
MCHC RBC-ENTMCNC: 29 PG
MCHC RBC-ENTMCNC: 30.5 GM/DL
MCV RBC AUTO: 95.3 FL
MONOCYTES # BLD AUTO: 0.68 K/UL
MONOCYTES NFR BLD AUTO: 11.4 %
NEUTROPHILS # BLD AUTO: 4.58 K/UL
NEUTROPHILS NFR BLD AUTO: 76.8 %
PLATELET # BLD AUTO: 136 K/UL
RBC # BLD: 3.79 M/UL
RBC # FLD: 16.6 %
WBC # FLD AUTO: 5.96 K/UL

## 2022-07-09 ENCOUNTER — RX RENEWAL (OUTPATIENT)
Age: 77
End: 2022-07-09

## 2022-08-02 ENCOUNTER — APPOINTMENT (OUTPATIENT)
Dept: GASTROENTEROLOGY | Facility: CLINIC | Age: 77
End: 2022-08-02

## 2022-08-02 VITALS
RESPIRATION RATE: 16 BRPM | WEIGHT: 201 LBS | TEMPERATURE: 98 F | BODY MASS INDEX: 28.14 KG/M2 | HEART RATE: 70 BPM | OXYGEN SATURATION: 98 % | SYSTOLIC BLOOD PRESSURE: 125 MMHG | DIASTOLIC BLOOD PRESSURE: 80 MMHG | HEIGHT: 71 IN

## 2022-08-02 DIAGNOSIS — R79.89 OTHER SPECIFIED ABNORMAL FINDINGS OF BLOOD CHEMISTRY: ICD-10-CM

## 2022-08-02 DIAGNOSIS — R19.5 OTHER FECAL ABNORMALITIES: ICD-10-CM

## 2022-08-02 PROCEDURE — 99214 OFFICE O/P EST MOD 30 MIN: CPT

## 2022-08-02 NOTE — ASSESSMENT
[FreeTextEntry1] : Cirrhosis with portal hypertensive gastropathy but no varices stable.  All recent blood work reviewed and found to be acceptable.  LFTs are normal.  Alpha-fetoprotein less than 1.8.  Serum albumin normal.  BUN 94 creatinine 3 and platelet count 113.\par Ecchymosis of Muse's esophagus, short segment.  No dysplasia on prior endoscopy done 1 year ago.  Remains asymptomatic on chronic PPI therapy.  No medication side effects.\par Appropriate candidate for surveillance endoscopy for Muse's esophagus.  As long as no varices then this would include biopsies to exclude dysplasia.  The procedure, its risk benefits and prep, were explained to the patient, who understands and is agreeable to proceed.  Repeat blood work should not be necessary.  Cardiac clearance requested via Dr. Schneider.  Anticoagulants already discontinued.  Not on any antiplatelet therapy currently.  COVID swab requested.  All medications to remain unchanged through procedure.  ASA #3.  Patient is in optimal medical condition to undergo planned procedure.  Arrangements made.  Results to follow.

## 2022-08-02 NOTE — PHYSICAL EXAM
[General Appearance - Alert] : alert [General Appearance - In No Acute Distress] : in no acute distress [Sclera] : the sclera and conjunctiva were normal [PERRL With Normal Accommodation] : pupils were equal in size, round, and reactive to light [Extraocular Movements] : extraocular movements were intact [Outer Ear] : the ears and nose were normal in appearance [Oropharynx] : the oropharynx was normal [Neck Appearance] : the appearance of the neck was normal [Neck Cervical Mass (___cm)] : no neck mass was observed [Jugular Venous Distention Increased] : there was no jugular-venous distention [Thyroid Diffuse Enlargement] : the thyroid was not enlarged [Thyroid Nodule] : there were no palpable thyroid nodules [] : no respiratory distress [Auscultation Breath Sounds / Voice Sounds] : lungs were clear to auscultation bilaterally [Heart Sounds] : normal S1 and S2 [Heart Sounds Gallop] : no gallops [Heart Sounds Pericardial Friction Rub] : no pericardial rub [Systolic grade ___/6] : A grade [unfilled]/6 systolic murmur was heard. [Normal Sphincter Tone] : normal sphincter tone [No Rectal Mass] : no rectal mass [Internal Hemorrhoid] : no internal hemorrhoids [External Hemorrhoid] : no external hemorrhoids [Occult Blood Positive] : stool positive for occult blood [FreeTextEntry1] : +3 pedal edema extending to the thighs

## 2022-08-02 NOTE — HISTORY OF PRESENT ILLNESS
[FreeTextEntry1] : 76-year-old white male with history of alcoholic cirrhosis portal hypertension short segment Muse's esophagus and upper endoscopy done here in 6/21 on chronic pantoprazole therapy.  Formerly on Coumadin for chronic atrial fibrillation but no longer.  Known to have pulmonary hypertension.  Noted to have mitral valve repair and resolution of the left auricle.  Patient never had varices.  Other medical history hypertension chronic renal insufficiency hyperlipidemia BPH hypothyroidism and CHF.\par Main stable.  No new complaints.  On 1 new medication Amanda HOFF for chronic renal insufficiency via renal consultant.  Very recently seen by cardiology Dr. Hernandez.  No new intervention.  Has loop recorder in place.  No pacemaker.  No ICD.\par Last endoscopy a year ago showed a short segment Muse's esophagus confirmed by biopsies, no advanced features.  No dysplasia.  But no varices.  Mild portal hypertensive gastropathy was noted and biopsies of the stomach were negative.\par No alcohol consumption.  Sonogram in January showed cirrhosis but no dilated ducts.  No space-occupying lesion of liver.  Status postcholecystectomy.  No ascites.\par CAT scan from 1.5 year ago also showed cirrhosis but no space-occupying lesions and absent gallbladder.\par Abdominal wall lesions ultimately proved to be hematomas on MRI scan.\par \par Denies dysphagia or odynophagia.  Denies any heartburn or regurgitation.  Compliant with pantoprazole.  Modest weight loss noted but no fluid retention.  No alcohol consumption.

## 2022-09-04 ENCOUNTER — EMERGENCY (EMERGENCY)
Facility: HOSPITAL | Age: 77
LOS: 1 days | Discharge: HOME | End: 2022-09-04
Attending: STUDENT IN AN ORGANIZED HEALTH CARE EDUCATION/TRAINING PROGRAM | Admitting: STUDENT IN AN ORGANIZED HEALTH CARE EDUCATION/TRAINING PROGRAM

## 2022-09-04 VITALS
OXYGEN SATURATION: 98 % | HEART RATE: 94 BPM | DIASTOLIC BLOOD PRESSURE: 84 MMHG | TEMPERATURE: 98 F | SYSTOLIC BLOOD PRESSURE: 159 MMHG | RESPIRATION RATE: 16 BRPM

## 2022-09-04 PROCEDURE — 99284 EMERGENCY DEPT VISIT MOD MDM: CPT | Mod: GC

## 2022-09-04 NOTE — ED ADULT NURSE NOTE - CAS EDN DISCHARGE INTERVENTIONS
8/5/2019      Jean Hunter  [id number]  [place] [address]                        This is to certify that Jean Hunter has been seen by [facility] [address]  and is unable to work 8/12/19 and 8/13/19. Had surgery on his hand.          Benjamin Jeong, DO   [facility] [address]  [address]  [place] [id number] [phone]  
IV discontinued, cath removed intact

## 2022-09-04 NOTE — ED ADULT TRIAGE NOTE - CHIEF COMPLAINT QUOTE
p/w clogged catheter and pain on urination placed on 9/1 (unable to elaborate on why placed), reports leakage to cath site

## 2022-09-04 NOTE — ED ADULT NURSE NOTE - OBJECTIVE STATEMENT
p/w clogged catheter and pain on urination placed on 9/1 (unable to elaborate on why placed), reports leakage to supra pubic cath site.  PT A&OX4.  No distress noted.  Denies CP, no SOB noted.  Breathing non-labored.  Abrasion noted to Left elbow.  PT stated he fell at home last Thursday night.  PT noted with leaking to supra pubic cath site, leg bag in place.  Family present.  Will continue to monitor.

## 2022-09-05 VITALS
SYSTOLIC BLOOD PRESSURE: 123 MMHG | OXYGEN SATURATION: 97 % | HEART RATE: 100 BPM | RESPIRATION RATE: 18 BRPM | DIASTOLIC BLOOD PRESSURE: 71 MMHG

## 2022-09-05 LAB
ALBUMIN SERPL ELPH-MCNC: 3.6 G/DL — SIGNIFICANT CHANGE UP (ref 3.3–5)
ALP SERPL-CCNC: 103 U/L — SIGNIFICANT CHANGE UP (ref 40–120)
ALT FLD-CCNC: 16 U/L — SIGNIFICANT CHANGE UP (ref 4–41)
ANION GAP SERPL CALC-SCNC: 16 MMOL/L — HIGH (ref 7–14)
ANISOCYTOSIS BLD QL: SLIGHT — SIGNIFICANT CHANGE UP
APPEARANCE UR: ABNORMAL
AST SERPL-CCNC: 17 U/L — SIGNIFICANT CHANGE UP (ref 4–40)
BACTERIA # UR AUTO: ABNORMAL
BASOPHILS # BLD AUTO: 0 K/UL — SIGNIFICANT CHANGE UP (ref 0–0.2)
BASOPHILS NFR BLD AUTO: 0 % — SIGNIFICANT CHANGE UP (ref 0–2)
BILIRUB SERPL-MCNC: 0.9 MG/DL — SIGNIFICANT CHANGE UP (ref 0.2–1.2)
BILIRUB UR-MCNC: NEGATIVE — SIGNIFICANT CHANGE UP
BUN SERPL-MCNC: 108 MG/DL — HIGH (ref 7–23)
BURR CELLS BLD QL SMEAR: PRESENT — SIGNIFICANT CHANGE UP
CALCIUM SERPL-MCNC: 8.6 MG/DL — SIGNIFICANT CHANGE UP (ref 8.4–10.5)
CHLORIDE SERPL-SCNC: 105 MMOL/L — SIGNIFICANT CHANGE UP (ref 98–107)
CO2 SERPL-SCNC: 16 MMOL/L — LOW (ref 22–31)
COLOR SPEC: YELLOW — SIGNIFICANT CHANGE UP
CREAT SERPL-MCNC: 3.31 MG/DL — HIGH (ref 0.5–1.3)
DIFF PNL FLD: ABNORMAL
EGFR: 19 ML/MIN/1.73M2 — LOW
ELLIPTOCYTES BLD QL SMEAR: SLIGHT — SIGNIFICANT CHANGE UP
EOSINOPHIL # BLD AUTO: 0.07 K/UL — SIGNIFICANT CHANGE UP (ref 0–0.5)
EOSINOPHIL NFR BLD AUTO: 0.9 % — SIGNIFICANT CHANGE UP (ref 0–6)
EPI CELLS # UR: 7 /HPF — HIGH (ref 0–5)
GLUCOSE SERPL-MCNC: 172 MG/DL — HIGH (ref 70–99)
GLUCOSE UR QL: NEGATIVE — SIGNIFICANT CHANGE UP
GRAN CASTS # UR COMP ASSIST: SIGNIFICANT CHANGE UP /LPF
HCT VFR BLD CALC: 28.5 % — LOW (ref 39–50)
HGB BLD-MCNC: 9 G/DL — LOW (ref 13–17)
HYALINE CASTS # UR AUTO: 26 /LPF — HIGH (ref 0–7)
IANC: 6.46 K/UL — SIGNIFICANT CHANGE UP (ref 1.8–7.4)
KETONES UR-MCNC: NEGATIVE — SIGNIFICANT CHANGE UP
LEUKOCYTE ESTERASE UR-ACNC: ABNORMAL
LYMPHOCYTES # BLD AUTO: 0.45 K/UL — LOW (ref 1–3.3)
LYMPHOCYTES # BLD AUTO: 5.9 % — LOW (ref 13–44)
MCHC RBC-ENTMCNC: 29.5 PG — SIGNIFICANT CHANGE UP (ref 27–34)
MCHC RBC-ENTMCNC: 31.6 GM/DL — LOW (ref 32–36)
MCV RBC AUTO: 93.4 FL — SIGNIFICANT CHANGE UP (ref 80–100)
MONOCYTES # BLD AUTO: 0.52 K/UL — SIGNIFICANT CHANGE UP (ref 0–0.9)
MONOCYTES NFR BLD AUTO: 6.8 % — SIGNIFICANT CHANGE UP (ref 2–14)
NEUTROPHILS # BLD AUTO: 6.66 K/UL — SIGNIFICANT CHANGE UP (ref 1.8–7.4)
NEUTROPHILS NFR BLD AUTO: 85.6 % — HIGH (ref 43–77)
NEUTS BAND # BLD: 0.8 % — SIGNIFICANT CHANGE UP (ref 0–6)
NITRITE UR-MCNC: NEGATIVE — SIGNIFICANT CHANGE UP
NRBC # BLD: 1 /100 — HIGH (ref 0–0)
OVALOCYTES BLD QL SMEAR: SLIGHT — SIGNIFICANT CHANGE UP
PH UR: 6 — SIGNIFICANT CHANGE UP (ref 5–8)
PLAT MORPH BLD: NORMAL — SIGNIFICANT CHANGE UP
PLATELET # BLD AUTO: 161 K/UL — SIGNIFICANT CHANGE UP (ref 150–400)
PLATELET COUNT - ESTIMATE: NORMAL — SIGNIFICANT CHANGE UP
POIKILOCYTOSIS BLD QL AUTO: SLIGHT — SIGNIFICANT CHANGE UP
POLYCHROMASIA BLD QL SMEAR: SLIGHT — SIGNIFICANT CHANGE UP
POTASSIUM SERPL-MCNC: 4.7 MMOL/L — SIGNIFICANT CHANGE UP (ref 3.5–5.3)
POTASSIUM SERPL-SCNC: 4.7 MMOL/L — SIGNIFICANT CHANGE UP (ref 3.5–5.3)
PROT SERPL-MCNC: 7.5 G/DL — SIGNIFICANT CHANGE UP (ref 6–8.3)
PROT UR-MCNC: ABNORMAL
RBC # BLD: 3.05 M/UL — LOW (ref 4.2–5.8)
RBC # FLD: 16.5 % — HIGH (ref 10.3–14.5)
RBC BLD AUTO: ABNORMAL
RBC CASTS # UR COMP ASSIST: SIGNIFICANT CHANGE UP /HPF (ref 0–4)
SODIUM SERPL-SCNC: 137 MMOL/L — SIGNIFICANT CHANGE UP (ref 135–145)
SP GR SPEC: 1.01 — SIGNIFICANT CHANGE UP (ref 1.01–1.05)
UROBILINOGEN FLD QL: SIGNIFICANT CHANGE UP
WBC # BLD: 7.71 K/UL — SIGNIFICANT CHANGE UP (ref 3.8–10.5)
WBC # FLD AUTO: 7.71 K/UL — SIGNIFICANT CHANGE UP (ref 3.8–10.5)
WBC UR QL: 59 /HPF — HIGH (ref 0–5)

## 2022-09-05 RX ORDER — ACETAMINOPHEN 500 MG
975 TABLET ORAL ONCE
Refills: 0 | Status: COMPLETED | OUTPATIENT
Start: 2022-09-05 | End: 2022-09-05

## 2022-09-05 NOTE — ED PROVIDER NOTE - CLINICAL SUMMARY MEDICAL DECISION MAKING FREE TEXT BOX
75yo F PMH GERD, HLD, BPH, Hypothyroidism, HTN, mitral valve repair (taken off of AC) p/w clogged suprapubic catheter and pain on urination placed on 9/1. Likely overflow incontinence from penile region. C/f urinary debris causing clog in catheter - will flush and reassess. Will obtain labs, urine study, POCUS bladder scan, reassess

## 2022-09-05 NOTE — CONSULT NOTE ADULT - SUBJECTIVE AND OBJECTIVE BOX
HPI  76M with HTN, HLD, Liver Cirrhosis, and urinary retention seen by Dr. Cade outpatient presented to ED with suprapubic discomfort due to suprapubic tube drainage issues. He was recently admitted to [place] for urosepsis where they were unable to place eubanks catheter and instead placed a 14 Fr Cook SPT. In the past, he has been doing CIC about 4x a day with no problems at home prior to this incident. The suprapubic tube was placed on  and yesterday he stated that it stopped draining appropriately causing discomfort. Denies any dysuria, fevers, or chills.     PAST MEDICAL & SURGICAL HISTORY:      MEDICATIONS  (STANDING):    MEDICATIONS  (PRN):      FAMILY HISTORY:      Allergies    No Known Allergies    Intolerances        SOCIAL HISTORY:    REVIEW OF SYSTEMS: Otherwise negative as stated in HPI    Physical Exam  Vital signs  T(C): 37.1 (22 @ 23:49), Max: 37.1 (22 @ 23:49)  HR: 100 (22 @ 00:11)  BP: 123/71 (22 @ 00:11)  SpO2: 97% (22 @ 00:11)  Wt(kg): --    Output      Gen: NAD  Pulm: No respiratory distress	  CV: RRR  GI: S/ND/NT  : Suprapubic tube in place draining clear yellow urine  MSK: moves all 4 limbs spontaneously    LABS:       @ 00:07    WBC 7.71  / Hct 28.5  / SCr 3.31         137  |  105  |  108<H>  ----------------------------<  172<H>  4.7   |  16<L>  |  3.31<H>    Ca    8.6      05 Sep 2022 00:07    TPro  7.5  /  Alb  3.6  /  TBili  0.9  /  DBili  x   /  AST  17  /  ALT  16  /  AlkPhos  103        Urinalysis Basic - ( 05 Sep 2022 00:13 )    Color: Yellow / Appearance: Slightly Turbid / S.014 / pH: x  Gluc: x / Ketone: Negative  / Bili: Negative / Urobili: <2 mg/dL   Blood: x / Protein: 100 mg/dL / Nitrite: Negative   Leuk Esterase: Large / RBC: 5-10 /HPF / WBC 59 /HPF   Sq Epi: x / Non Sq Epi: 7 /HPF / Bacteria: Occasional      Urine Cx: pending

## 2022-09-05 NOTE — ED PROVIDER NOTE - PHYSICAL EXAMINATION
G: NAD, cooperative with exam   H: NCAT  E: EOMI   M: Mucous membranes moist   R: CTABL, nWOB  C: RRR  A: Soft, suprapubic TTP, ND, no rebound/guarding

## 2022-09-05 NOTE — CONSULT NOTE ADULT - ASSESSMENT
76M with HTN, HLD, Liver Cirrhosis, and urinary retention seen by Dr. Cade outpatient presented to ED with suprapubic discomfort due to suprapubic tube drainage issues. Unkinked suprapubic tube bedside and flushed it appropriately. Overall about 1.3 L of urine came out of suprapubic tube. After unkinking, the SPT drained well with no issues  - Discussed with patient that this may happen again and what to do if it does and how it can be avoided  - Patient states that he would like to follow up with Dr. Cade to have further discussions for long term urinary drainage  - Follow up urine output  - Follow up urine culture  - Patient can be discharged home per urology  - Follow up with Dr. Cade this coming week for further discussion for long term urinary drainage  - Discussed with Dr. Christensen    Urology  [id number]    [facility]  [address] [place] [address] [phone]

## 2022-09-05 NOTE — ED PROVIDER NOTE - NSFOLLOWUPINSTRUCTIONS_ED_ALL_ED_FT
Your diagnosis for this visit was: CATHETER DYSFUNCTION     CONTINUE TO TAKE YOUR ANTIBIOTIC (CEFPODOXIME) AS PRESCRIBED. YOU HAVE A URINARY TRACT INFECTION.     You may be contacted by the Emergency Department Referrals Coordinator to set up your follow-up appointment within 24-48 hours of your discharge, Monday to Friday. We recommend you follow up with: UROLOGY DR. HERNANDEZ     Please return to the Emergency Department if you experience any of the following symptoms:   - Shortness of breath or trouble breathing  - Pressure, pain or tightness in the chest  - Face drooping, arm weakness or speech difficulty  - Persistence of severe vomiting  - Head injury or loss of consciousness  - Nonstop bleeding or an open wound    (1) Follow up with your primary care physician within the next 24-48 hours as discussed. In addition, we did not find evidence of a life threatening illness on your testing here today, but listed below are the specialists that will be necessary to see as an outpatient to continue the workup.  Please call the numbers listed below or [phone] to set up the necessary appointments.  (2) Take Tylenol (up to 1000mg or 1 g)  up to every 6 hours as needed for pain.   (3) If you had an IV (intravenous) line placed, it was removed. Sometimes, after IV removal, that area can be tender for a few days; if it develops redness and swelling, those could be signs of infection; in which case, return to the Emergency Department for assessment.  (4) Please continue taking all of your home medications as directed.

## 2022-09-05 NOTE — ED PROVIDER NOTE - CARE PROVIDER_API CALL
Renzo Cade)  Urology  [address] [place] [address], [place] [address]  Phone: [phone]  Fax: [phone]  Follow Up Time:

## 2022-09-05 NOTE — ED PROVIDER NOTE - OBJECTIVE STATEMENT
75yo F PMH GERD, HLD, BPH, Hypothyroidism, HTN, mitral valve repair (taken off of AC) p/w clogged suprapubic catheter and pain on urination placed on 9/1. Reports leakage to cath site. States he was discharged from [facility] 3 days ago. At that visit he was noted to be uroseptic, tx with ABX, unable to urinate therefore suprapubic catheter was placed. Previously used to self catheterize. States he has had leakage from penile region. Has not changed leg bag since this morning. Discharged on Cefpodoxime - currently on day 3 of this abx. 77yo M PMH GERD, HLD, BPH, Hypothyroidism, HTN, mitral valve repair (taken off of AC) p/w clogged suprapubic catheter and pain on urination placed on 9/1. Reports leakage to cath site. States he was discharged from [facility] 3 days ago. At that visit he was noted to be uroseptic, tx with ABX, unable to urinate therefore suprapubic catheter was placed. Previously used to self catheterize. States he has had leakage from penile region. Has not changed leg bag since this morning. Discharged on Cefpodoxime - currently on day 3 of this abx.

## 2022-09-05 NOTE — ED PROVIDER NOTE - NS ED ROS FT
Gen: No F/C/NS  Head: No falls/head trauma  Eyes: No changes in vision   Resp: No cough or trouble breathing  Cardiovascular: No chest pain or palpitation  Gastroenteric: No N/V/D  :  +change in urine output. No dysuria or hematuria   MS: No joint or muscle pain  Neuro: No headache

## 2022-09-05 NOTE — ED PROVIDER NOTE - PATIENT PORTAL LINK FT
You can access the FollowMyHealth Patient Portal offered by [facility] by registering at the following website: [email or web address]. By joining [facility]’s FollowMyHealth portal, you will also be able to view your health information using other applications (apps) compatible with our system.

## 2022-09-05 NOTE — ED PROVIDER NOTE - ATTENDING CONTRIBUTION TO CARE
Dr. Barlow, Attending Physician-  I performed a face to face bedside interview with patient regarding history of present illness, review of symptoms and past medical history. I completed an independent physical exam.  I have discussed patient's plan of care with the resident.    76M, GERD, HLD, BPH, who presents with clogged suprapubic catheter. Also notes incontinence through penis. Reports that he was discharged from OSH 3 days prior. Presents today because of worsening abdominal pain and distention and urinary leakage from his penis. Currently on day 3 of PO ABX. No headaches, fevers, chills, cough, sputum, cp, nvd. Physical: afebrile, uncomfortable appearing, tachycardic, ctabl, abdomen distended with suprapubic distention, suprapubic eubanks in place, urine coming from penis. Plan: will attempt to flush suprapubic eubanks, screening labs/urine,  consult.

## 2022-09-05 NOTE — ED PROVIDER NOTE - PROGRESS NOTE DETAILS
Gris Bear MD (PGY3) -  Bladder decompressed. Draining well Sister to p/u at 7am Gris Bear MD (PGY3) - Bladder volume 800cc. Currently the catheter is draining. Patient wishes to remain here to ensure it does not dysfunction again

## 2022-09-06 ENCOUNTER — NON-APPOINTMENT (OUTPATIENT)
Age: 77
End: 2022-09-06

## 2022-09-06 LAB
CULTURE RESULTS: NO GROWTH — SIGNIFICANT CHANGE UP
SPECIMEN SOURCE: SIGNIFICANT CHANGE UP

## 2022-09-08 ENCOUNTER — APPOINTMENT (OUTPATIENT)
Dept: UROLOGY | Facility: CLINIC | Age: 77
End: 2022-09-08

## 2022-09-08 VITALS
WEIGHT: 204 LBS | SYSTOLIC BLOOD PRESSURE: 115 MMHG | HEIGHT: 71 IN | RESPIRATION RATE: 17 BRPM | OXYGEN SATURATION: 95 % | HEART RATE: 95 BPM | BODY MASS INDEX: 28.56 KG/M2 | DIASTOLIC BLOOD PRESSURE: 69 MMHG

## 2022-09-08 PROCEDURE — 99213 OFFICE O/P EST LOW 20 MIN: CPT

## 2022-09-18 NOTE — HISTORY OF PRESENT ILLNESS
[FreeTextEntry1] : Patient is a 76 year old man comes in today for urinary retention\par \par Was admitted to [facility] for a UTI\par Reports UTI symptoms included fever, chills, confusion\par Was being catheterized by the nurses on the unit. During one of the attempts nurse was unable to pass catheter caused urethral trauma.  Therefore, SPT was placed.\par \par Denies any difficulties with catheterization prior to hospitalization.\par \par

## 2022-09-18 NOTE — ASSESSMENT
[FreeTextEntry1] : Bladder filled, patient voided and able to catheterize w/o difficulty.\par SPT removed.  \par Follow up in one month.

## 2022-09-19 LAB — SARS-COV-2 N GENE NPH QL NAA+PROBE: NOT DETECTED

## 2022-09-20 ENCOUNTER — OUTPATIENT (OUTPATIENT)
Dept: OUTPATIENT SERVICES | Facility: HOSPITAL | Age: 77
LOS: 1 days | Discharge: HOME | End: 2022-09-20
Payer: MEDICARE

## 2022-09-20 ENCOUNTER — APPOINTMENT (OUTPATIENT)
Dept: GASTROENTEROLOGY | Facility: HOSPITAL | Age: 77
End: 2022-09-20

## 2022-09-20 ENCOUNTER — RESULT REVIEW (OUTPATIENT)
Age: 77
End: 2022-09-20

## 2022-09-20 ENCOUNTER — TRANSCRIPTION ENCOUNTER (OUTPATIENT)
Age: 77
End: 2022-09-20

## 2022-09-20 DIAGNOSIS — K22.70 BARRETT'S ESOPHAGUS WITHOUT DYSPLASIA: ICD-10-CM

## 2022-09-20 PROCEDURE — 88342 IMHCHEM/IMCYTCHM 1ST ANTB: CPT

## 2022-09-20 PROCEDURE — 43239 EGD BIOPSY SINGLE/MULTIPLE: CPT

## 2022-09-20 PROCEDURE — 88305 TISSUE EXAM BY PATHOLOGIST: CPT

## 2022-09-20 PROCEDURE — 88342 IMHCHEM/IMCYTCHM 1ST ANTB: CPT | Mod: 26

## 2022-09-20 PROCEDURE — 88305 TISSUE EXAM BY PATHOLOGIST: CPT | Mod: 26

## 2022-09-20 NOTE — PHYSICAL EXAM
[Alert] : alert [Normal Voice/Communication] : normal voice/communication [Healthy Appearing] : healthy appearing [No Acute Distress] : no acute distress [Sclera] : the sclera and conjunctiva were normal [Hearing Threshold Finger Rub Not [place]] : hearing was normal [Normal Lips/Gums] : the lips and gums were normal [Oropharynx] : the oropharynx was normal [Normal Appearance] : the appearance of the neck was normal [No Neck Mass] : no neck mass was observed [No Respiratory Distress] : no respiratory distress [No Acc Muscle Use] : no accessory muscle use [Respiration, Rhythm And Depth] : normal respiratory rhythm and effort [Auscultation Breath Sounds / Voice Sounds] : lungs were clear to auscultation bilaterally [Heart Rate And Rhythm] : heart rate was normal and rhythm regular [Normal S1, S2] : normal S1 and S2 [Murmurs] : no murmurs [Bowel Sounds] : normal bowel sounds [Abdomen Tenderness] : non-tender [No Masses] : no abdominal mass palpated [Abdomen Soft] : soft [] : no hepatosplenomegaly [Oriented To Time, Place, And Person] : oriented to person, place, and time

## 2022-09-20 NOTE — HISTORY OF PRESENT ILLNESS
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  No varices. [FreeTextEntry1] : 5/18   small hyperplastic cecal polyp, diverticulosis.  No hemorrhoids.

## 2022-09-20 NOTE — ASSESSMENT
[FreeTextEntry1] : Appropriate candidate for endoscopy and biopsies as follow-up for prior Muse's esophagus.  Rule out varices.  Banding if needed.  ASA #3.  Optimized.  Consented.  Cardiac clearance obtained, on chart.

## 2022-09-21 NOTE — REVIEW OF SYSTEMS
Quality 110: Preventive Care And Screening: Influenza Immunization: Influenza Immunization previously received during influenza season Quality 111:Pneumonia Vaccination Status For Older Adults: Pneumococcal vaccine (PPSV23) administered on or after patient’s 60th birthday and before the end of the measurement period Detail Level: Detailed Quality 226: Preventive Care And Screening: Tobacco Use: Screening And Cessation Intervention: Patient screened for tobacco use and is an ex/non-smoker [Negative] : Heme/Lymph Quality 431: Preventive Care And Screening: Unhealthy Alcohol Use - Screening: Patient not identified as an unhealthy alcohol user when screened for unhealthy alcohol use using a systematic screening method

## 2022-09-22 LAB — SURGICAL PATHOLOGY STUDY: SIGNIFICANT CHANGE UP

## 2022-10-06 ENCOUNTER — APPOINTMENT (OUTPATIENT)
Dept: UROLOGY | Facility: CLINIC | Age: 77
End: 2022-10-06

## 2022-10-06 ENCOUNTER — RESULT REVIEW (OUTPATIENT)
Age: 77
End: 2022-10-06

## 2022-10-06 VITALS
HEIGHT: 71 IN | TEMPERATURE: 98.4 F | SYSTOLIC BLOOD PRESSURE: 118 MMHG | DIASTOLIC BLOOD PRESSURE: 66 MMHG | HEART RATE: 70 BPM | BODY MASS INDEX: 28.56 KG/M2 | WEIGHT: 204 LBS | RESPIRATION RATE: 17 BRPM

## 2022-10-06 DIAGNOSIS — R19.09 OTHER INTRA-ABDOMINAL AND PELVIC SWELLING, MASS AND LUMP: ICD-10-CM

## 2022-10-06 PROCEDURE — 99213 OFFICE O/P EST LOW 20 MIN: CPT

## 2022-10-06 NOTE — ASSESSMENT
[FreeTextEntry1] : Patient will remain on clean remittent catheterization.\par CT pelvis with p.o. and IV contrast ordered.\par Will call with results.

## 2022-10-06 NOTE — HISTORY OF PRESENT ILLNESS
[FreeTextEntry1] : Here for 4 week follow up.\par Remains on CIC.  SPT was removed 4 weeks ago after traumatic attempted Jain catheter placement during hospitalization for UTI.\par \par Since his last visit, doing well.  Remains on clean intermittent catheterization.\par \par Today has complaints of right groin swelling.  Was seen by his PCP and thought possibly that it was a hernia.  No associated fever/chills, nausea/vomiting.  No abdominal pain or flank pain.

## 2022-10-06 NOTE — PHYSICAL EXAM
[General Appearance - Well Developed] : well developed [General Appearance - Well Nourished] : well nourished [Normal Appearance] : normal appearance [Well Groomed] : well groomed [General Appearance - In No Acute Distress] : no acute distress [Abdomen Soft] : soft [Abdomen Tenderness] : non-tender [Costovertebral Angle Tenderness] : no ~M costovertebral angle tenderness [Urethral Meatus] : meatus normal [Penis Abnormality] : normal circumcised penis [Urinary Bladder Findings] : the bladder was normal on palpation [FreeTextEntry1] : Right testis is somewhat firm with swelling extending into the right groin.  Tender to palpation.  Unclear if this represents a hernia versus inflammatory condition. [No Palpable Adenopathy] : no palpable adenopathy

## 2022-10-08 ENCOUNTER — OUTPATIENT (OUTPATIENT)
Dept: OUTPATIENT SERVICES | Facility: HOSPITAL | Age: 77
LOS: 1 days | End: 2022-10-08
Payer: MEDICARE

## 2022-10-08 ENCOUNTER — APPOINTMENT (OUTPATIENT)
Dept: CT IMAGING | Facility: CLINIC | Age: 77
End: 2022-10-08

## 2022-10-08 DIAGNOSIS — R19.09 OTHER INTRA-ABDOMINAL AND PELVIC SWELLING, MASS AND LUMP: ICD-10-CM

## 2022-10-08 PROCEDURE — 72192 CT PELVIS W/O DYE: CPT

## 2022-10-08 PROCEDURE — 72192 CT PELVIS W/O DYE: CPT | Mod: 26,MH

## 2022-10-13 ENCOUNTER — INPATIENT (INPATIENT)
Facility: HOSPITAL | Age: 77
LOS: 3 days | Discharge: HOME | DRG: 314 | End: 2022-10-17
Attending: HOSPITALIST | Admitting: STUDENT IN AN ORGANIZED HEALTH CARE EDUCATION/TRAINING PROGRAM
Payer: MEDICARE

## 2022-10-13 VITALS
TEMPERATURE: 98 F | SYSTOLIC BLOOD PRESSURE: 124 MMHG | RESPIRATION RATE: 20 BRPM | DIASTOLIC BLOOD PRESSURE: 77 MMHG | HEART RATE: 82 BPM | OXYGEN SATURATION: 100 % | WEIGHT: 184.97 LBS | HEIGHT: 72 IN

## 2022-10-13 DIAGNOSIS — N17.9 ACUTE KIDNEY FAILURE, UNSPECIFIED: ICD-10-CM

## 2022-10-13 DIAGNOSIS — D63.8 ANEMIA IN OTHER CHRONIC DISEASES CLASSIFIED ELSEWHERE: ICD-10-CM

## 2022-10-13 DIAGNOSIS — E78.5 HYPERLIPIDEMIA, UNSPECIFIED: ICD-10-CM

## 2022-10-13 DIAGNOSIS — J96.01 ACUTE RESPIRATORY FAILURE WITH HYPOXIA: ICD-10-CM

## 2022-10-13 DIAGNOSIS — I26.99 OTHER PULMONARY EMBOLISM WITHOUT ACUTE COR PULMONALE: ICD-10-CM

## 2022-10-13 DIAGNOSIS — I10 ESSENTIAL (PRIMARY) HYPERTENSION: ICD-10-CM

## 2022-10-13 DIAGNOSIS — Z02.9 ENCOUNTER FOR ADMINISTRATIVE EXAMINATIONS, UNSPECIFIED: ICD-10-CM

## 2022-10-13 DIAGNOSIS — R09.89 OTHER SPECIFIED SYMPTOMS AND SIGNS INVOLVING THE CIRCULATORY AND RESPIRATORY SYSTEMS: ICD-10-CM

## 2022-10-13 LAB
ABO RH CONFIRMATION: SIGNIFICANT CHANGE UP
ALBUMIN SERPL ELPH-MCNC: 3.5 G/DL — SIGNIFICANT CHANGE UP (ref 3.3–5.2)
ALP SERPL-CCNC: 84 U/L — SIGNIFICANT CHANGE UP (ref 40–120)
ALT FLD-CCNC: 10 U/L — SIGNIFICANT CHANGE UP
ANION GAP SERPL CALC-SCNC: 13 MMOL/L — SIGNIFICANT CHANGE UP (ref 5–17)
ANISOCYTOSIS BLD QL: SLIGHT — SIGNIFICANT CHANGE UP
APTT BLD: 33.4 SEC — SIGNIFICANT CHANGE UP (ref 27.5–35.5)
AST SERPL-CCNC: 18 U/L — SIGNIFICANT CHANGE UP
BASOPHILS # BLD AUTO: 0.25 K/UL — HIGH (ref 0–0.2)
BASOPHILS NFR BLD AUTO: 2.6 % — HIGH (ref 0–2)
BILIRUB SERPL-MCNC: 0.7 MG/DL — SIGNIFICANT CHANGE UP (ref 0.4–2)
BLD GP AB SCN SERPL QL: SIGNIFICANT CHANGE UP
BUN SERPL-MCNC: 89.5 MG/DL — HIGH (ref 8–20)
CALCIUM SERPL-MCNC: 8.8 MG/DL — SIGNIFICANT CHANGE UP (ref 8.4–10.5)
CHLORIDE SERPL-SCNC: 107 MMOL/L — SIGNIFICANT CHANGE UP (ref 98–107)
CO2 SERPL-SCNC: 21 MMOL/L — LOW (ref 22–29)
CREAT SERPL-MCNC: 2.62 MG/DL — HIGH (ref 0.5–1.3)
D DIMER BLD IA.RAPID-MCNC: 244 NG/ML DDU — HIGH
EGFR: 25 ML/MIN/1.73M2 — LOW
ELLIPTOCYTES BLD QL SMEAR: SIGNIFICANT CHANGE UP
EOSINOPHIL # BLD AUTO: 0 K/UL — SIGNIFICANT CHANGE UP (ref 0–0.5)
EOSINOPHIL NFR BLD AUTO: 0 % — SIGNIFICANT CHANGE UP (ref 0–6)
GIANT PLATELETS BLD QL SMEAR: PRESENT — SIGNIFICANT CHANGE UP
GLUCOSE SERPL-MCNC: 115 MG/DL — HIGH (ref 70–99)
HCT VFR BLD CALC: 25.7 % — LOW (ref 39–50)
HGB BLD-MCNC: 7.5 G/DL — LOW (ref 13–17)
HIV 1 & 2 AB SERPL IA.RAPID: SIGNIFICANT CHANGE UP
LIDOCAIN IGE QN: 105 U/L — HIGH (ref 22–51)
LYMPHOCYTES # BLD AUTO: 0.33 K/UL — LOW (ref 1–3.3)
LYMPHOCYTES # BLD AUTO: 3.5 % — LOW (ref 13–44)
MACROCYTES BLD QL: SLIGHT — SIGNIFICANT CHANGE UP
MAGNESIUM SERPL-MCNC: 2.5 MG/DL — SIGNIFICANT CHANGE UP (ref 1.6–2.6)
MANUAL SMEAR VERIFICATION: SIGNIFICANT CHANGE UP
MCHC RBC-ENTMCNC: 26.6 PG — LOW (ref 27–34)
MCHC RBC-ENTMCNC: 29.2 GM/DL — LOW (ref 32–36)
MCV RBC AUTO: 91.1 FL — SIGNIFICANT CHANGE UP (ref 80–100)
MONOCYTES # BLD AUTO: 0.74 K/UL — SIGNIFICANT CHANGE UP (ref 0–0.9)
MONOCYTES NFR BLD AUTO: 7.8 % — SIGNIFICANT CHANGE UP (ref 2–14)
NEUTROPHILS # BLD AUTO: 8.16 K/UL — HIGH (ref 1.8–7.4)
NEUTROPHILS NFR BLD AUTO: 86.1 % — HIGH (ref 43–77)
NT-PROBNP SERPL-SCNC: 8302 PG/ML — HIGH (ref 0–300)
OVALOCYTES BLD QL SMEAR: SLIGHT — SIGNIFICANT CHANGE UP
PLAT MORPH BLD: NORMAL — SIGNIFICANT CHANGE UP
PLATELET # BLD AUTO: 224 K/UL — SIGNIFICANT CHANGE UP (ref 150–400)
POIKILOCYTOSIS BLD QL AUTO: SIGNIFICANT CHANGE UP
POLYCHROMASIA BLD QL SMEAR: SLIGHT — SIGNIFICANT CHANGE UP
POTASSIUM SERPL-MCNC: 4.9 MMOL/L — SIGNIFICANT CHANGE UP (ref 3.5–5.3)
POTASSIUM SERPL-SCNC: 4.9 MMOL/L — SIGNIFICANT CHANGE UP (ref 3.5–5.3)
PROT SERPL-MCNC: 7.3 G/DL — SIGNIFICANT CHANGE UP (ref 6.6–8.7)
RAPID RVP RESULT: SIGNIFICANT CHANGE UP
RBC # BLD: 2.82 M/UL — LOW (ref 4.2–5.8)
RBC # FLD: 17.6 % — HIGH (ref 10.3–14.5)
RBC BLD AUTO: ABNORMAL
SARS-COV-2 RNA SPEC QL NAA+PROBE: SIGNIFICANT CHANGE UP
SODIUM SERPL-SCNC: 140 MMOL/L — SIGNIFICANT CHANGE UP (ref 135–145)
TROPONIN T SERPL-MCNC: 0.05 NG/ML — SIGNIFICANT CHANGE UP (ref 0–0.06)
WBC # BLD: 9.48 K/UL — SIGNIFICANT CHANGE UP (ref 3.8–10.5)
WBC # FLD AUTO: 9.48 K/UL — SIGNIFICANT CHANGE UP (ref 3.8–10.5)

## 2022-10-13 PROCEDURE — 99233 SBSQ HOSP IP/OBS HIGH 50: CPT

## 2022-10-13 PROCEDURE — 99497 ADVNCD CARE PLAN 30 MIN: CPT | Mod: 25

## 2022-10-13 PROCEDURE — 99223 1ST HOSP IP/OBS HIGH 75: CPT

## 2022-10-13 PROCEDURE — 99285 EMERGENCY DEPT VISIT HI MDM: CPT | Mod: CS

## 2022-10-13 PROCEDURE — 93306 TTE W/DOPPLER COMPLETE: CPT | Mod: 26

## 2022-10-13 PROCEDURE — 71045 X-RAY EXAM CHEST 1 VIEW: CPT | Mod: 26

## 2022-10-13 PROCEDURE — 93010 ELECTROCARDIOGRAM REPORT: CPT

## 2022-10-13 RX ORDER — HEPARIN SODIUM 5000 [USP'U]/ML
INJECTION INTRAVENOUS; SUBCUTANEOUS
Qty: 25000 | Refills: 0 | Status: DISCONTINUED | OUTPATIENT
Start: 2022-10-13 | End: 2022-10-13

## 2022-10-13 RX ORDER — SODIUM CHLORIDE 9 MG/ML
1000 INJECTION INTRAMUSCULAR; INTRAVENOUS; SUBCUTANEOUS
Refills: 0 | Status: DISCONTINUED | OUTPATIENT
Start: 2022-10-13 | End: 2022-10-15

## 2022-10-13 RX ORDER — ONDANSETRON 8 MG/1
4 TABLET, FILM COATED ORAL EVERY 8 HOURS
Refills: 0 | Status: DISCONTINUED | OUTPATIENT
Start: 2022-10-13 | End: 2022-10-17

## 2022-10-13 RX ORDER — ATORVASTATIN CALCIUM 80 MG/1
20 TABLET, FILM COATED ORAL AT BEDTIME
Refills: 0 | Status: DISCONTINUED | OUTPATIENT
Start: 2022-10-13 | End: 2022-10-17

## 2022-10-13 RX ORDER — HYDRALAZINE HCL 50 MG
50 TABLET ORAL
Refills: 0 | Status: DISCONTINUED | OUTPATIENT
Start: 2022-10-13 | End: 2022-10-17

## 2022-10-13 RX ORDER — LEVOTHYROXINE SODIUM 125 MCG
50 TABLET ORAL DAILY
Refills: 0 | Status: DISCONTINUED | OUTPATIENT
Start: 2022-10-13 | End: 2022-10-17

## 2022-10-13 RX ORDER — HEPARIN SODIUM 5000 [USP'U]/ML
8000 INJECTION INTRAVENOUS; SUBCUTANEOUS EVERY 6 HOURS
Refills: 0 | Status: DISCONTINUED | OUTPATIENT
Start: 2022-10-13 | End: 2022-10-15

## 2022-10-13 RX ORDER — HEPARIN SODIUM 5000 [USP'U]/ML
INJECTION INTRAVENOUS; SUBCUTANEOUS
Qty: 25000 | Refills: 0 | Status: DISCONTINUED | OUTPATIENT
Start: 2022-10-13 | End: 2022-10-15

## 2022-10-13 RX ORDER — ACETAMINOPHEN 500 MG
650 TABLET ORAL EVERY 6 HOURS
Refills: 0 | Status: DISCONTINUED | OUTPATIENT
Start: 2022-10-13 | End: 2022-10-17

## 2022-10-13 RX ORDER — HEPARIN SODIUM 5000 [USP'U]/ML
3000 INJECTION INTRAVENOUS; SUBCUTANEOUS EVERY 6 HOURS
Refills: 0 | Status: DISCONTINUED | OUTPATIENT
Start: 2022-10-13 | End: 2022-10-13

## 2022-10-13 RX ORDER — ENOXAPARIN SODIUM 100 MG/ML
INJECTION SUBCUTANEOUS
Refills: 0 | Status: DISCONTINUED | OUTPATIENT
Start: 2022-10-13 | End: 2022-10-13

## 2022-10-13 RX ORDER — LANOLIN ALCOHOL/MO/W.PET/CERES
3 CREAM (GRAM) TOPICAL AT BEDTIME
Refills: 0 | Status: DISCONTINUED | OUTPATIENT
Start: 2022-10-13 | End: 2022-10-17

## 2022-10-13 RX ORDER — BUMETANIDE 0.25 MG/ML
1 INJECTION INTRAMUSCULAR; INTRAVENOUS
Refills: 0 | Status: DISCONTINUED | OUTPATIENT
Start: 2022-10-13 | End: 2022-10-17

## 2022-10-13 RX ORDER — HEPARIN SODIUM 5000 [USP'U]/ML
6500 INJECTION INTRAVENOUS; SUBCUTANEOUS EVERY 6 HOURS
Refills: 0 | Status: DISCONTINUED | OUTPATIENT
Start: 2022-10-13 | End: 2022-10-13

## 2022-10-13 RX ORDER — FINASTERIDE 5 MG/1
1 TABLET, FILM COATED ORAL
Qty: 0 | Refills: 0 | DISCHARGE

## 2022-10-13 RX ORDER — METOPROLOL TARTRATE 50 MG
50 TABLET ORAL DAILY
Refills: 0 | Status: DISCONTINUED | OUTPATIENT
Start: 2022-10-13 | End: 2022-10-17

## 2022-10-13 RX ORDER — HEPARIN SODIUM 5000 [USP'U]/ML
4000 INJECTION INTRAVENOUS; SUBCUTANEOUS EVERY 6 HOURS
Refills: 0 | Status: DISCONTINUED | OUTPATIENT
Start: 2022-10-13 | End: 2022-10-15

## 2022-10-13 RX ORDER — ALBUTEROL 90 UG/1
2.5 AEROSOL, METERED ORAL EVERY 6 HOURS
Refills: 0 | Status: DISCONTINUED | OUTPATIENT
Start: 2022-10-13 | End: 2022-10-17

## 2022-10-13 RX ORDER — ENOXAPARIN SODIUM 100 MG/ML
80 INJECTION SUBCUTANEOUS ONCE
Refills: 0 | Status: DISCONTINUED | OUTPATIENT
Start: 2022-10-13 | End: 2022-10-13

## 2022-10-13 RX ADMIN — ATORVASTATIN CALCIUM 20 MILLIGRAM(S): 80 TABLET, FILM COATED ORAL at 22:00

## 2022-10-13 RX ADMIN — SODIUM CHLORIDE 50 MILLILITER(S): 9 INJECTION INTRAMUSCULAR; INTRAVENOUS; SUBCUTANEOUS at 22:01

## 2022-10-13 RX ADMIN — HEPARIN SODIUM 1800 UNIT(S)/HR: 5000 INJECTION INTRAVENOUS; SUBCUTANEOUS at 20:46

## 2022-10-13 NOTE — H&P ADULT - NSHPLABSRESULTS_GEN_ALL_CORE
Personally reviewed available labs, imaging and ekg     Labs  CBC Full  -  ( 13 Oct 2022 15:39 )  WBC Count : 9.48 K/uL  RBC Count : 2.82 M/uL  Hemoglobin : 7.5 g/dL  Hematocrit : 25.7 %  Platelet Count - Automated : 224 K/uL  Mean Cell Volume : 91.1 fl  Mean Cell Hemoglobin : 26.6 pg  Mean Cell Hemoglobin Concentration : 29.2 gm/dL  Auto Neutrophil # : 8.16 K/uL  Auto Lymphocyte # : 0.33 K/uL  Auto Monocyte # : 0.74 K/uL  Auto Eosinophil # : 0.00 K/uL  Auto Basophil # : 0.25 K/uL  Auto Neutrophil % : 86.1 %  Auto Lymphocyte % : 3.5 %  Auto Monocyte % : 7.8 %  Auto Eosinophil % : 0.0 %  Auto Basophil % : 2.6 %    10-13    140  |  107  |  89.5<H>  ----------------------------<  115<H>  4.9   |  21.0<L>  |  2.62<H>    Ca    8.8      13 Oct 2022 15:39  Mg     2.5     10-13    TPro  7.3  /  Alb  3.5  /  TBili  0.7  /  DBili  x   /  AST  18  /  ALT  10  /  AlkPhos  84  10-13        CAPILLARY BLOOD GLUCOSE        CARDIAC MARKERS ( 13 Oct 2022 15:39 )  x     / 0.05 ng/mL / x     / x     / x                Imaging    EKG Personally reviewed available labs, imaging and ekg     Labs  CBC Full  -  ( 13 Oct 2022 15:39 )  WBC Count : 9.48 K/uL  RBC Count : 2.82 M/uL  Hemoglobin : 7.5 g/dL  Hematocrit : 25.7 %  Platelet Count - Automated : 224 K/uL  Mean Cell Volume : 91.1 fl  Mean Cell Hemoglobin : 26.6 pg  Mean Cell Hemoglobin Concentration : 29.2 gm/dL  Auto Neutrophil # : 8.16 K/uL  Auto Lymphocyte # : 0.33 K/uL  Auto Monocyte # : 0.74 K/uL  Auto Eosinophil # : 0.00 K/uL  Auto Basophil # : 0.25 K/uL  Auto Neutrophil % : 86.1 %  Auto Lymphocyte % : 3.5 %  Auto Monocyte % : 7.8 %  Auto Eosinophil % : 0.0 %  Auto Basophil % : 2.6 %    10-13    140  |  107  |  89.5<H>  ----------------------------<  115<H>  4.9   |  21.0<L>  |  2.62<H>    Ca    8.8      13 Oct 2022 15:39  Mg     2.5     10-13    TPro  7.3  /  Alb  3.5  /  TBili  0.7  /  DBili  x   /  AST  18  /  ALT  10  /  AlkPhos  84  10-13        CAPILLARY BLOOD GLUCOSE        CARDIAC MARKERS ( 13 Oct 2022 15:39 )  x     / 0.05 ng/mL / x     / x     / x                Imaging- pending    EKG- pending

## 2022-10-13 NOTE — CONSULT NOTE ADULT - SUBJECTIVE AND OBJECTIVE BOX
PULMONARY CONSULT NOTE      LETICIA MCCLURE  MRN-[id number]    Patient is a 77y old  Male who presents with a chief complaint of SOB SLADE (13 Oct 2022 15:10)      HISTORY OF PRESENT ILLNESS:    77 y m with pmh of hld, htn, hypothyroidism, MVP presenting for sob. Has been going on for the last 6-8 weeks, has not been getting worse. Worse on exertion, when lying down flat on back. Denies LE swelling, which decreased after taking Bumex 1 mg per day, has been adherent. Was sent in today from cardiologist office and RO PE. Pt has never had a clot, denies recent travel, no HO cancer. Denies f/c, n/v, cp, ab pain, lightheadedness. Pt was seen in our office in the past but not since 2018. Last spirometry was c/w moderate restriction but no obstructive lung disease. He is a former smoker with ? asthma and reported h/o severe MARIMAR in 2017 with an AHI of 40.5 and was prescribed APAP though has not been compliant. Pt now feeling better with O2 sat 98% on 2 lpm NC. He is not maintained on BD therapy. He is scheduled for an evaluation at [place] for his pulm HTN.  Spoke with cardiology and pt with h/o MVR in 2005 and echo today with mod-sev AS, mild MR and possible RV apical thrombus though with normal EF.   Pt with recent UTI and CRI with elevated creatinine.    MEDICATIONS  (STANDING):  enoxaparin Injectable 80 milliGRAM(s) SubCutaneous once  enoxaparin Injectable          MEDICATIONS  (PRN):      Allergies    No Known Allergies    Intolerances        PAST MEDICAL & SURGICAL HISTORY:  HTN (hypertension)      HLD (hyperlipidemia)      No significant past surgical history          FAMILY HISTORY:      SOCIAL HISTORY  Smoking History: Former smoker    REVIEW OF SYSTEMS:    CONSTITUTIONAL:  No fevers, chills, sweats    HEENT:  Eyes:  No diplopia or blurred vision. ENT:  No earache, sore throat or runny nose.    CARDIOVASCULAR:  No pressure, squeezing, tightness, or heaviness about the chest; no palpitations.    RESPIRATORY:  Per HPI    GASTROINTESTINAL:  No abdominal pain, nausea, vomiting or diarrhea.    GENITOURINARY:  No dysuria, frequency or urgency.    NEUROLOGIC:  No paresthesias, fasciculations, seizures or weakness.    PSYCHIATRIC:  No disorder of thought or mood.    Vital Signs Last 24 Hrs  T(C): 36.7 (13 Oct 2022 16:02), Max: 36.8 (13 Oct 2022 12:56)  T(F): 98 (13 Oct 2022 16:02), Max: 98.2 (13 Oct 2022 12:56)  HR: 76 (13 Oct 2022 16:02) (76 - 82)  BP: 149/63 (13 Oct 2022 16:02) (124/77 - 149/63)  BP(mean): --  RR: 24 (13 Oct 2022 16:02) (20 - 24)  SpO2: 100% (13 Oct 2022 16:02) (100% - 100%)    Parameters below as of 13 Oct 2022 16:02  Patient On (Oxygen Delivery Method): nasal cannula        PHYSICAL EXAMINATION:    GENERAL: The patient is a well-developed, well-nourished male in no apparent distress.     HEENT: Head is normocephalic and atraumatic. Extraocular muscles are intact. Mucous membranes are moist.     NECK: Supple.     LUNGS: Clear to auscultation without wheezing, rales, or rhonchi. Respirations unlabored    HEART: Regular rate and rhythm with murmur.    ABDOMEN: Soft, nontender, and nondistended.  No hepatosplenomegaly is noted.    EXTREMITIES: Without any cyanosis, with mild b/l edema.    NEUROLOGIC: Grossly intact.      LABS:                        7.5    9.48  )-----------( 224      ( 13 Oct 2022 15:39 )             25.7     10-13    140  |  107  |  89.5<H>  ----------------------------<  115<H>  4.9   |  21.0<L>  |  2.62<H>    Ca    8.8      13 Oct 2022 15:39  Mg     2.5     10-13    TPro  7.3  /  Alb  3.5  /  TBili  0.7  /  DBili  x   /  AST  18  /  ALT  10  /  AlkPhos  84  10-13          CARDIAC MARKERS ( 13 Oct 2022 15:39 )  x     / 0.05 ng/mL / x     / x     / x          D-Dimer Assay, Quantitative: 244 ng/mL DDU (10-13-22 @ 15:39)    Serum Pro-Brain Natriuretic Peptide: 8302 pg/mL (10-13-22 @ 15:39)        RADIOLOGY & ADDITIONAL STUDIES:    Imaging studies pending    ECHO: pending PULMONARY CONSULT NOTE      LETICIA MCCLURE  MRN-[id number]    Patient is a 77y old  Male who presents with a chief complaint of SOB SLADE (13 Oct 2022 15:10)      HISTORY OF PRESENT ILLNESS:    77 y m with pmh of hld, htn, hypothyroidism, MVP presenting for sob. Has been going on for the last 6-8 weeks, has not been getting worse. Worse on exertion, when lying down flat on back. Denies LE swelling, which decreased after taking Bumex 1 mg per day, has been adherent. Was sent in today from cardiologist office and RO PE. Pt has never had a clot, denies recent travel, no HO cancer. Denies f/c, n/v, cp, ab pain, lightheadedness. Pt was seen in our office in the past but not since 2018. Last spirometry was c/w moderate restriction but no obstructive lung disease. He is a former smoker onf 1-1.5 ppd x 15 years, quit 1980, with ? asthma and reported h/o severe MARIMAR in 2017 with an AHI of 40.5 and was prescribed APAP though has not been compliant. Pt now feeling better with O2 sat 98% on 2 lpm NC. He is not maintained on BD therapy. He is scheduled for an evaluation at [place] for his pulm HTN.  Spoke with cardiology and pt with h/o MVR in 2005 and echo today with mod-sev AS, mild MR and possible RV apical thrombus though with normal EF.   Pt with recent UTI and CRI with elevated creatinine.    MEDICATIONS  (STANDING):  enoxaparin Injectable 80 milliGRAM(s) SubCutaneous once  enoxaparin Injectable          MEDICATIONS  (PRN):      Allergies    No Known Allergies    Intolerances        PAST MEDICAL & SURGICAL HISTORY:  HTN (hypertension)      HLD (hyperlipidemia)      No significant past surgical history          FAMILY HISTORY:      SOCIAL HISTORY  Smoking History: Former smoker    REVIEW OF SYSTEMS:    CONSTITUTIONAL:  No fevers, chills, sweats    HEENT:  Eyes:  No diplopia or blurred vision. ENT:  No earache, sore throat or runny nose.    CARDIOVASCULAR:  No pressure, squeezing, tightness, or heaviness about the chest; no palpitations.    RESPIRATORY:  Per HPI    GASTROINTESTINAL:  No abdominal pain, nausea, vomiting or diarrhea.    GENITOURINARY:  No dysuria, frequency or urgency.    NEUROLOGIC:  No paresthesias, fasciculations, seizures or weakness.    PSYCHIATRIC:  No disorder of thought or mood.    Vital Signs Last 24 Hrs  T(C): 36.7 (13 Oct 2022 16:02), Max: 36.8 (13 Oct 2022 12:56)  T(F): 98 (13 Oct 2022 16:02), Max: 98.2 (13 Oct 2022 12:56)  HR: 76 (13 Oct 2022 16:02) (76 - 82)  BP: 149/63 (13 Oct 2022 16:02) (124/77 - 149/63)  BP(mean): --  RR: 24 (13 Oct 2022 16:02) (20 - 24)  SpO2: 100% (13 Oct 2022 16:02) (100% - 100%)    Parameters below as of 13 Oct 2022 16:02  Patient On (Oxygen Delivery Method): nasal cannula        PHYSICAL EXAMINATION:    GENERAL: The patient is a well-developed, well-nourished male in no apparent distress.     HEENT: Head is normocephalic and atraumatic. Extraocular muscles are intact. Mucous membranes are moist.     NECK: Supple.     LUNGS: Clear to auscultation without wheezing, rales, or rhonchi. Respirations unlabored    HEART: Regular rate and rhythm with murmur.    ABDOMEN: Soft, nontender, and nondistended.  No hepatosplenomegaly is noted.    EXTREMITIES: Without any cyanosis, with mild b/l edema.    NEUROLOGIC: Grossly intact.      LABS:                        7.5    9.48  )-----------( 224      ( 13 Oct 2022 15:39 )             25.7     10-13    140  |  107  |  89.5<H>  ----------------------------<  115<H>  4.9   |  21.0<L>  |  2.62<H>    Ca    8.8      13 Oct 2022 15:39  Mg     2.5     10-13    TPro  7.3  /  Alb  3.5  /  TBili  0.7  /  DBili  x   /  AST  18  /  ALT  10  /  AlkPhos  84  10-13          CARDIAC MARKERS ( 13 Oct 2022 15:39 )  x     / 0.05 ng/mL / x     / x     / x          D-Dimer Assay, Quantitative: 244 ng/mL DDU (10-13-22 @ 15:39)    Serum Pro-Brain Natriuretic Peptide: 8302 pg/mL (10-13-22 @ 15:39)        RADIOLOGY & ADDITIONAL STUDIES:    Imaging studies pending    ECHO: pending

## 2022-10-13 NOTE — H&P ADULT - PROBLEM SELECTOR PLAN 5
- DVT ppx- Heparin gtt will be ordered once patient weight is entered  - Diet- DASH diet  - Dispo- pending clinical improvement. PT c/s placed  - CODE STATUS- FULL CODE     Updated patient's son, Maulik ([phone]) on 10/13. - c/w home medication

## 2022-10-13 NOTE — ED PROVIDER NOTE - CLINICAL SUMMARY MEDICAL DECISION MAKING FREE TEXT BOX
Pt presenting from cardiologist for SOB. Cardiologist found RV dilation and clot in heart. Unable to get CTA of chest given renal function. Starting AC and admitting. Will get VQ and US FOLEY.

## 2022-10-13 NOTE — H&P ADULT - ASSESSMENT
77yo M PMH GERD, HLD, BPH, Hypothyroidism, HTN, mitral valve repair in 2005 w/ JEANMARIE ligation, severe pulm HTN and A.fib not on AC who presents from his outpatient cardiologist office with TTE findings concerning for PE. Now admitted for further workup and management.

## 2022-10-13 NOTE — H&P ADULT - NSHPREVIEWOFSYSTEMS_GEN_ALL_CORE
CONSTITUTIONAL: denies fever, chills, fatigue, weakness  HEENT: denies blurred vision, sore throat  SKIN: denies new lesions, rash  CARDIOVASCULAR: denies chest pain, chest pressure, palpitations  RESPIRATORY: denies shortness of breath, sputum production  GASTROINTESTINAL: denies nausea, vomiting, diarrhea, abdominal pain  GENITOURINARY: denies dysuria, discharge  NEUROLOGICAL: denies numbness, headache, focal weakness  MUSCULOSKELETAL: denies new joint pain, muscle aches  HEMATOLOGIC: denies gross bleeding, bruising  LYMPHATICS: denies enlarged lymph nodes, extremity swelling  PSYCHIATRIC: denies recent changes in anxiety, depression  ENDOCRINOLOGIC: denies sweating, cold or heat intolerance CONSTITUTIONAL: denies fever, chills, fatigue, weakness  HEENT: denies blurred vision, sore throat  SKIN: denies new lesions, rash  CARDIOVASCULAR: denies chest pain, chest pressure, palpitations  RESPIRATORY: + shortness of breath, denies sputum production  GASTROINTESTINAL: denies nausea, vomiting, diarrhea, abdominal pain  GENITOURINARY: denies dysuria, discharge  NEUROLOGICAL: denies numbness, headache, focal weakness  MUSCULOSKELETAL: denies new joint pain, muscle aches  HEMATOLOGIC: denies gross bleeding, bruising  LYMPHATICS: denies enlarged lymph nodes, extremity swelling  PSYCHIATRIC: denies recent changes in anxiety, depression  ENDOCRINOLOGIC: denies sweating, cold or heat intolerance

## 2022-10-13 NOTE — ED ADULT NURSE NOTE - OBJECTIVE STATEMENT
Assumed care of patient in b3L. Pt a&xo4 rr even and unlabored with pmhx of afib(not on ac) and heart failture. Pt presents to ed sent from [place] cardiology to r/o PE as pt has sob. Pt reports being treated for a uti 6 weeks ago and the sob started around that time. Pt denies chest pain, vision changes, numbness/tingling, gu/gi symptoms. end note Assumed care of patient in b3L. Pt a&xo4 rr even and unlabored with pmhx of afib(not on ac), pulmonary htn and heart failture. Pt presents to ed sent from [place] cardiology to r/o PE as pt has sob. Pt reports being treated for a uti 6 weeks ago and the sob started around that time. Pt denies chest pain, vision changes, numbness/tingling, gu/gi symptoms. end note

## 2022-10-13 NOTE — ED PROVIDER NOTE - ATTENDING CONTRIBUTION TO CARE
Patient present from cards with worsening SLADE.  Cards performed echo in office and found RV dilitation and apical clot.  Cards sent patient in for presumed PE.  On exam, patient with scant wheeze, mild BLE edema, no abd pain, and normal neuro.  ROS with no active chest pain.  Lab values with renal insufficiency.  d/w cards and will treat.  will admit.  pulm to consult.  d/w medicine for admission.

## 2022-10-13 NOTE — PROGRESS NOTE ADULT - SUBJECTIVE AND OBJECTIVE BOX
Admitted from office with worsening SOB SLADE and respiratory failure for the last few weeks.   Hx of severe pulmonary HTN followed at [facility]   Hx of MV repair 2005 with JEANMARIE ligation AF Aflutter not on AC followed at [facility] by EP , MARIMAR, HTN HCL   TTE done in office limited confirmed severe RV enlargement and a possible RV Apical thrombus , preserved LVfx mod sev AS s/p MV repair mild MR .      Plan    Admit to tele  CT of chest with IV contrast r/o PE  Full AC with Lovenox 1mg/kg/BID  Pulmonary eval Dr Navarro group  Diuresis, Lytes replacement  Will repeat TTE with definity for better evaluation       RADHA CISNEROS, RICARDO, YESENIA   Admitted from office with worsening SOB SLADE and respiratory failure for the last few weeks.   Hx of severe pulmonary HTN followed at [facility]   Hx of MV repair 2005 with JEANMARIE ligation AF Aflutter not on AC followed at [facility] by EP , MARIMAR, HTN HCL   TTE done in office limited confirmed severe RV enlargement and a possible RV Apical thrombus , preserved LVfx mod sev AS s/p MV repair mild MR .      Plan    Admit to tele  VQ r/o PE Hx of CKD  Full AC with Lovenox 1mg/kg/BID  Pulmonary eval Dr Navarro group  Diuresis, Lytes replacement  Will repeat TTE with definity for better evaluation       RADHA CISNEROS, FACC, YESENIA

## 2022-10-13 NOTE — H&P ADULT - NSHPSOCIALHISTORY_GEN_ALL_CORE
Former smoker of 1-1.5 ppd x 15 years, quit 1980. He lives at home alone. Former smoker of 1-1.5 ppd x 15 years, quit 1980. Denies drinking alcohol.

## 2022-10-13 NOTE — H&P ADULT - PROBLEM SELECTOR PLAN 3
- Given patient is on therapuetic lovenox with significant cardiac disease, will transfuse 1U PRBC  - monitor H/H and keep Hg> 8 - c/w home medication - Likely pre-renal  - avoid nephrotoxic medications  - Will start NS at 50 cc/hr and reassess renal function in AM

## 2022-10-13 NOTE — H&P ADULT - NSHPPHYSICALEXAM_GEN_ALL_CORE
Vital Signs Last 24 Hrs  T(F): 98 (13 Oct 2022 16:02), Max: 98.2 (13 Oct 2022 12:56)  HR: 76 (13 Oct 2022 16:02) (76 - 82)  BP: 149/63 (13 Oct 2022 16:02) (124/77 - 149/63)  RR: 24 (13 Oct 2022 16:02) (20 - 24)  SpO2: 100% (13 Oct 2022 16:02) (100% - 100%)    Physical Exam:  Constitutional: NAD, awake and alert, well-developed  Neck: Soft and supple, No LAD, No JVD  Respiratory: cta b/l no wheezing no rhonchi  Cardiovascular: +s1/s2 no edema b/l le  Gastrointestinal: soft nt nd bs+  Vascular: 2+ peripheral pulses  Neurological: A/O x 3, no focal deficits  Musculoskeletal: 5/5 strength b/l upper and lower extremities  Skin:  No obvious pathologic skin lesions   Hematologic / Lymph / Immunologic: No bleeding from IV sites or wounds, No lymphadenopathy, No Hives or allergic type skin lesions Vital Signs Last 24 Hrs  T(F): 98 (13 Oct 2022 16:02), Max: 98.2 (13 Oct 2022 12:56)  HR: 76 (13 Oct 2022 16:02) (76 - 82)  BP: 149/63 (13 Oct 2022 16:02) (124/77 - 149/63)  RR: 24 (13 Oct 2022 16:02) (20 - 24)  SpO2: 100% (13 Oct 2022 16:02) (100% - 100%)    Physical Exam:  Constitutional: NAD, awake and alert, well-developed  Neck: Soft and supple, No LAD, No JVD  Respiratory: cta b/l no wheezing no rhonchi, no labored breathing  Cardiovascular: +s1/s2 no edema b/l le  Gastrointestinal: soft nt nd bs+  Vascular: 2+ peripheral pulses  Neurological: A/O x 3, no focal deficits  Musculoskeletal: 5/5 strength b/l upper and lower extremities  Skin:  No obvious pathologic skin lesions   Hematologic / Lymph / Immunologic: No bleeding from IV sites or wounds, No lymphadenopathy, No Hives or allergic type skin lesions

## 2022-10-13 NOTE — ED PROVIDER NOTE - PHYSICAL EXAMINATION
General: well appearing, NAD  Head: NC, AT  EENT: EOMI, no scleral icterus  Cardiac: RRR, no apparent murmurs, +lower extremity edema to mid calf  Respiratory: No  no respiratory distress   Abdomen: soft, ND, NT, nonperitonitic  MSK/Vascular: full ROM, soft compartments, warm extremities  Neuro: AAOx3, sensation to light touch intact  Psych: calm, cooperative

## 2022-10-13 NOTE — ED ADULT NURSE NOTE - CHIEF COMPLAINT QUOTE
C/o shortness of breath. +Dyspnea on exertion. Pt sent from [place] cardiology to r/o PE. Hx of A-fib (not on anticoagulants) and HF.

## 2022-10-13 NOTE — H&P ADULT - PROBLEM SELECTOR PLAN 2
- Given patient is on therapeutic AC with significant cardiac disease, will transfuse 1U PRBC  - monitor H/H and keep Hg> 8

## 2022-10-13 NOTE — ED ADULT NURSE REASSESSMENT NOTE - NS ED NURSE REASSESS COMMENT FT1
Pulmonology at bedside for pt evaluation; awaiting cardiology consult and TTE.  Pt afib on cardiac monitor.  POC and wait time explained to pt with verbalized understanding

## 2022-10-13 NOTE — CONSULT NOTE ADULT - ASSESSMENT
Former smoker  Worsening SOBOE  H/o severe MARIMAR with AHI of 40.5  Restriction on prior spirometry  Pulm HTN likely on the basis of VHD given prior MVR and current mod to sev AS  ? RV apical thrombus  Concern for VTE  D-dimer elevated    Rec:    On AC for ? RV thrombus  Further w/u for VTE with LE duplex and possible V/Q  Noncontrast chest CT  LE duplex  Albuterol prn for now  Eventual outpt pulm f/u with PFTs  PAP therapy for MARIMAR when agreeable  Await f/u echo    d/w cardiology   Former smoker of 1-1.5 ppd x 15 years, quit 1980  Worsening SOBOE  H/o severe MARIMAR with AHI of 40.5; not compliant with PAP therapy and does not want  Restriction on prior spirometry  Pulm HTN likely on the basis of VHD given prior MVR and current mod to sev AS  ? RV apical thrombus  Concern for VTE  D-dimer elevated    Rec:    On AC for ? RV thrombus  Further w/u for VTE with LE duplex and possible V/Q  Noncontrast chest CT  LE duplex  Albuterol prn for now  Eventual outpt pulm f/u with PFTs  PAP therapy for MARIMAR when agreeable but refusing for now  Await f/u echo    d/w cardiology

## 2022-10-13 NOTE — ED ADULT NURSE NOTE - ED STAT RN HANDOFF DETAILS
Report given to ZOFIA Cox; questions answered. Pt remains alert and O x4. NAD noted. Resp E/U. G20 IV's remains patent and asx; Heparin drip still infusing at same rate. Hooked pt to telemetry box with pulse ox on. O2 maintained at 2L via NC. Pt remains pain free.

## 2022-10-13 NOTE — ED ADULT TRIAGE NOTE - CHIEF COMPLAINT QUOTE
C/o shortness of breath. +Dyspnea on exertion. Pt sent from [place] cardiology to r/o PE. Hx of A-fib (not on anticoagulants) and HF.

## 2022-10-13 NOTE — H&P ADULT - HISTORY OF PRESENT ILLNESS
75yo M PMH GERD, HLD, BPH, Hypothyroidism, HTN, mitral valve repair in 2005 w/ JEANMARIE ligation, severe pulm HTN and A.fib not on AC who presents from his outpatient cardiologist office with TTE findings concerning for PE. Today, he had a limited TTE that showed severe RV enlargement and possible RV apical thrombus. Patient was endorsing SLADE for several weeks. No recent travel hx or hx of malignancy.     In the ED, his vitals were WNL. Labs notable for Hg- 7.5, D-dimer- 244, Cr- 2.62, and elevated proBNP.  75yo M PMH GERD, HLD, BPH, Hypothyroidism, HTN, mitral valve repair in 2005 w/ JEANMARIE ligation, severe pulm HTN and A.fib not on AC who presents from his outpatient cardiologist office with TTE findings concerning for PE. Today, he had a limited TTE that showed severe RV enlargement and possible RV apical thrombus. Patient was endorsing progressively worsening SLADE for several weeks. He was unable to walk greater than 20 feet. Denies chest pain, LE swelling, n/v/f/c/h/d, and dysuria. No recent travel hx or hx of malignancy. Of note, he was admitted at [place] for UTI 6-8 weeks ago. In 2021, he had a ablation done at [place] and was advised to discontinue AC.     In the ED, his vitals were WNL. Labs notable for Hg- 7.5, D-dimer- 244, Cr- 2.62, and elevated proBNP.

## 2022-10-13 NOTE — H&P ADULT - PROBLEM SELECTOR PLAN 1
- Patient presented to outpatient cardiologist office with SLADE. Had limited TTE that was concerning for RV enlargement and RV apical thrombus  - Appreciate cardiology recs- started heparin gtt once patient is entered. Will give Bumex 1 mg BID  - F/u US Duplex LE to r/o DVT  - V/Q scan to r/o PE - Patient presented to outpatient cardiologist office with SLADE. Had limited TTE that was concerning for RV enlargement and RV apical thrombus  - Appreciate cardiology recs- started heparin gtt once patient is entered. Will give Bumex 1 mg BID  - F/u US Duplex LE to r/o DVT  - F/u CT chest to assess SOB  - V/Q scan to r/o PE - Patient presented to outpatient cardiologist office with SLADE. Had limited TTE that was concerning for RV enlargement and RV apical thrombus  - Appreciate cardiology recs- started heparin gtt once patient is entered. Will give Bumex 1 mg BID  - F/u US Duplex LE to r/o DVT  - F/u CT chest to assess SOB  - F/u TTE  - V/Q scan to r/o PE

## 2022-10-13 NOTE — H&P ADULT - PROBLEM SELECTOR PLAN 6
- DVT ppx- Heparin gtt will be ordered once patient weight is entered  - Diet- DASH diet  - Dispo- pending clinical improvement. PT c/s placed  - CODE STATUS- FULL CODE     Updated patient's son, Maulik ([phone]) on 10/13.

## 2022-10-13 NOTE — ED PROVIDER NOTE - OBJECTIVE STATEMENT
77 y m with pmh of hld, htn, hypothyroidims, mitravl valve repair presenting for sob. Has been going on for the last 6-8 weeks, has not been getting worse. Worse on exertion, when lying down flat on back. Denies LE swelling, which decreased after taking Bumex 1 mg per day, has been adherent. Was sent in today from cardiologist office and RO PE. Pt has never had a clot, denies recent travel, no HO cancer. Denies f/c, n/v, cp, ab pain, lightheadedness.

## 2022-10-14 DIAGNOSIS — G47.33 OBSTRUCTIVE SLEEP APNEA (ADULT) (PEDIATRIC): ICD-10-CM

## 2022-10-14 DIAGNOSIS — I35.0 NONRHEUMATIC AORTIC (VALVE) STENOSIS: ICD-10-CM

## 2022-10-14 DIAGNOSIS — I27.22 PULMONARY HYPERTENSION DUE TO LEFT HEART DISEASE: ICD-10-CM

## 2022-10-14 LAB
ALBUMIN SERPL ELPH-MCNC: 3.4 G/DL — SIGNIFICANT CHANGE UP (ref 3.3–5.2)
ALP SERPL-CCNC: 89 U/L — SIGNIFICANT CHANGE UP (ref 40–120)
ALT FLD-CCNC: 10 U/L — SIGNIFICANT CHANGE UP
ANION GAP SERPL CALC-SCNC: 16 MMOL/L — SIGNIFICANT CHANGE UP (ref 5–17)
APTT BLD: 107.2 SEC — HIGH (ref 27.5–35.5)
APTT BLD: 143.3 SEC — CRITICAL HIGH (ref 27.5–35.5)
AST SERPL-CCNC: 15 U/L — SIGNIFICANT CHANGE UP
BILIRUB SERPL-MCNC: 0.9 MG/DL — SIGNIFICANT CHANGE UP (ref 0.4–2)
BUN SERPL-MCNC: 87.1 MG/DL — HIGH (ref 8–20)
CALCIUM SERPL-MCNC: 8.7 MG/DL — SIGNIFICANT CHANGE UP (ref 8.4–10.5)
CHLORIDE SERPL-SCNC: 109 MMOL/L — HIGH (ref 98–107)
CO2 SERPL-SCNC: 19 MMOL/L — LOW (ref 22–29)
CREAT SERPL-MCNC: 2.32 MG/DL — HIGH (ref 0.5–1.3)
EGFR: 28 ML/MIN/1.73M2 — LOW
GLUCOSE SERPL-MCNC: 115 MG/DL — HIGH (ref 70–99)
HCT VFR BLD CALC: 28.5 % — LOW (ref 39–50)
HCV AB S/CO SERPL IA: 0.09 S/CO — SIGNIFICANT CHANGE UP (ref 0–0.99)
HCV AB SERPL-IMP: SIGNIFICANT CHANGE UP
HGB BLD-MCNC: 8.5 G/DL — LOW (ref 13–17)
MCHC RBC-ENTMCNC: 27.2 PG — SIGNIFICANT CHANGE UP (ref 27–34)
MCHC RBC-ENTMCNC: 29.8 GM/DL — LOW (ref 32–36)
MCV RBC AUTO: 91.3 FL — SIGNIFICANT CHANGE UP (ref 80–100)
PLATELET # BLD AUTO: 244 K/UL — SIGNIFICANT CHANGE UP (ref 150–400)
POTASSIUM SERPL-MCNC: 4.9 MMOL/L — SIGNIFICANT CHANGE UP (ref 3.5–5.3)
POTASSIUM SERPL-SCNC: 4.9 MMOL/L — SIGNIFICANT CHANGE UP (ref 3.5–5.3)
PROT SERPL-MCNC: 7.2 G/DL — SIGNIFICANT CHANGE UP (ref 6.6–8.7)
RBC # BLD: 3.12 M/UL — LOW (ref 4.2–5.8)
RBC # FLD: 17.1 % — HIGH (ref 10.3–14.5)
SODIUM SERPL-SCNC: 144 MMOL/L — SIGNIFICANT CHANGE UP (ref 135–145)
WBC # BLD: 10.78 K/UL — HIGH (ref 3.8–10.5)
WBC # FLD AUTO: 10.78 K/UL — HIGH (ref 3.8–10.5)

## 2022-10-14 PROCEDURE — 78582 LUNG VENTILAT&PERFUS IMAGING: CPT | Mod: 26

## 2022-10-14 PROCEDURE — 71250 CT THORAX DX C-: CPT | Mod: 26

## 2022-10-14 PROCEDURE — 93970 EXTREMITY STUDY: CPT | Mod: 26

## 2022-10-14 PROCEDURE — 99233 SBSQ HOSP IP/OBS HIGH 50: CPT

## 2022-10-14 RX ADMIN — Medication 50 MILLIGRAM(S): at 17:06

## 2022-10-14 RX ADMIN — BUMETANIDE 1 MILLIGRAM(S): 0.25 INJECTION INTRAMUSCULAR; INTRAVENOUS at 06:07

## 2022-10-14 RX ADMIN — Medication 50 MICROGRAM(S): at 06:07

## 2022-10-14 RX ADMIN — HEPARIN SODIUM 0 UNIT(S)/HR: 5000 INJECTION INTRAVENOUS; SUBCUTANEOUS at 17:43

## 2022-10-14 RX ADMIN — Medication 50 MILLIGRAM(S): at 06:07

## 2022-10-14 RX ADMIN — BUMETANIDE 1 MILLIGRAM(S): 0.25 INJECTION INTRAMUSCULAR; INTRAVENOUS at 13:14

## 2022-10-14 RX ADMIN — HEPARIN SODIUM 1600 UNIT(S)/HR: 5000 INJECTION INTRAVENOUS; SUBCUTANEOUS at 09:12

## 2022-10-14 RX ADMIN — HEPARIN SODIUM 1300 UNIT(S)/HR: 5000 INJECTION INTRAVENOUS; SUBCUTANEOUS at 18:50

## 2022-10-14 RX ADMIN — ATORVASTATIN CALCIUM 20 MILLIGRAM(S): 80 TABLET, FILM COATED ORAL at 22:33

## 2022-10-14 NOTE — PROGRESS NOTE ADULT - SUBJECTIVE AND OBJECTIVE BOX
[facility]                                   [address] [place] [address]                                                      PHONE: [phone]                                                         FAX: [phone]  [email or web address]  ---------------------------------------------------------------------------------------------------------------------------------    Overnight events/patient complaints:    No CP  remains c/o SOB but able to speak full sentences   patient presently in the ER s/p 1 Unit of PRBC  TTE no RV thrombus severe AS vs Low flow low gradient AS   VQ pending     PAST MEDICAL & SURGICAL HISTORY:  HTN (hypertension)      HLD (hyperlipidemia)      No significant past surgical history        No Known Allergies    MEDICATIONS  (STANDING):  atorvastatin 20 milliGRAM(s) Oral at bedtime  buMETAnide Injectable 1 milliGRAM(s) IV Push two times a day  heparin  Infusion.  Unit(s)/Hr (18 mL/Hr) IV Continuous <Continuous>  hydrALAZINE 50 milliGRAM(s) Oral two times a day  levothyroxine 50 MICROGram(s) Oral daily  metoprolol succinate ER 50 milliGRAM(s) Oral daily  sodium chloride 0.9%. 1000 milliLiter(s) (50 mL/Hr) IV Continuous <Continuous>    MEDICATIONS  (PRN):  acetaminophen     Tablet .. 650 milliGRAM(s) Oral every 6 hours PRN Temp greater or equal to 38C (100.4F), Mild Pain (1 - 3)  ALBUTerol    0.083% 2.5 milliGRAM(s) Nebulizer every 6 hours PRN Shortness of Breath and/or Wheezing  aluminum hydroxide/magnesium hydroxide/simethicone Suspension 30 milliLiter(s) Oral every 4 hours PRN Dyspepsia  heparin   Injectable 8000 Unit(s) IV Push every 6 hours PRN For aPTT less than 40  heparin   Injectable 4000 Unit(s) IV Push every 6 hours PRN For aPTT between 40 - 57  melatonin 3 milliGRAM(s) Oral at bedtime PRN Insomnia  ondansetron Injectable 4 milliGRAM(s) IV Push every 8 hours PRN Nausea and/or Vomiting      Vital Signs Last 24 Hrs  T(C): 36.4 (14 Oct 2022 05:38), Max: 36.9 (14 Oct 2022 04:11)  T(F): 97.5 (14 Oct 2022 05:38), Max: 98.5 (14 Oct 2022 04:11)  HR: 69 (14 Oct 2022 05:38) (69 - 96)  BP: 142/72 (14 Oct 2022 05:38) (101/63 - 158/65)  BP(mean): --  RR: 16 (14 Oct 2022 05:38) (16 - 24)  SpO2: 100% (14 Oct 2022 05:38) (96% - 100%)    Parameters below as of 14 Oct 2022 05:38  Patient On (Oxygen Delivery Method): room air      ICU Vital Signs Last 24 Hrs  LETICIA MCCLURE  I&O's Detail    I&O's Summary    Drug Dosing Weight  LETICIA MCCLURE    REVIEW OF SYSTEMS:    Constitutional: No fever, weight loss or fatigue  Eyes: No eye pain, visual disturbances, or discharge  ENMT:  No difficulty hearing, tinnitus, vertigo; No sinus or throat pain  Neck: No pain or stiffness  Respiratory: No cough, wheezing, chills or hemoptysis  Cardiovascular: No chest pain, palpitations, shortness of breath, dizziness or leg swelling  Gastrointestinal: No abdominal or epigastric pain. No nausea, vomiting or hematemesis; No diarrhea or constipation. No melena or hematochezia.  Genitourinary: No dysuria, frequency, hematuria or incontinence  Rectal: No pain, hemorrhoids or incontinence  Neurological: No headaches, memory loss, loss of strength, numbness or tremors  Skin: No itching, burning, rashes or lesions   Lymph Nodes: No enlarged glands  Endocrine: No heat or cold intolerance; No hair loss  Musculoskeletal: No joint pain or swelling; No muscle, back or extremity pain  Psychiatric: No depression, anxiety, mood swings or difficulty sleeping  Heme/Lymph: No easy bruising or bleeding gums  Allergy and Immunologic: No hives or eczema    PHYSICAL EXAM:  General: Appears alert and cooperative.  HEENT: Head; normocephalic, atraumatic.  Eyes: Pupils reactive, cornea wnl.  Neck: Supple, no nodes adenopathy, no NVD or carotid bruit or thyromegaly.  CARDIOVASCULAR: Normal S1 and S2,  SEM3-6 murmur, rub, gallop or lift.   LUNGS: No rales, rhonchi or wheeze. Normal breath sounds bilaterally.  ABDOMEN: Soft, nontender without mass or organomegaly. bowel sounds normoactive.  EXTREMITIES: No clubbing, cyanosis or edema. Distal pulses wnl.   SKIN: warm and dry with normal turgor.  NEURO: Alert/oriented x 3/normal motor exam. No pathologic reflexes.    PSYCH: normal affect.        LABS:                        8.5    10.78 )-----------( 244      ( 14 Oct 2022 06:49 )             28.5     10-14    144  |  109<H>  |  87.1<H>  ----------------------------<  115<H>  4.9   |  19.0<L>  |  2.32<H>    Ca    8.7      14 Oct 2022 06:49  Mg     2.5     10-13    TPro  7.2  /  Alb  3.4  /  TBili  0.9  /  DBili  x   /  AST  15  /  ALT  10  /  AlkPhos  89  10-14    LETICIA MCCLURE  CARDIAC MARKERS ( 13 Oct 2022 15:39 )  x     / 0.05 ng/mL / x     / x     / x          PTT - ( 14 Oct 2022 06:49 )  PTT:107.2 sec      RADIOLOGY & ADDITIONAL STUDIES:    INTERPRETATION OF TELEMETRY (personally reviewed):    ECG:    ECHO: < from: TTE Echo Complete w/ Contrast w/ Doppler (10.13.22 @ 18:18) >  PHYSICIAN INTERPRETATION:  Left Ventricle: Endocardial visualization was enhanced with intravenous   echo contrast. The left ventricular internal cavity size is normal.  Global LV systolic function was low normal. Left ventricular ejection   fraction, by visual estimation, is 50 to 55%. The interventricular septum   is flattened in systole and diastole, consistent with right ventricular   pressure and volume overload. The mitral in-flow pattern reveals no   discernable A-wave, therefore no comment on diastolic function can be  made.  Right Ventricle: The right ventricular size is severely enlarged. RV   systolic function is severely reduced. No evidence of RV thrombus.  Left Atrium: Severely enlarged left atrium.  Right Atrium: Severely enlarged right atrium.  Pericardium: There is no evidence of pericardial effusion.  Mitral Valve: Mild mitral valve regurgitation is seen. Known mitral valve   repair.  Tricuspid Valve: The tricuspid valve is normal in structure. Severe   tricuspid regurgitation is visualized. Estimated pulmonary artery   systolic pressure is 61.8 mmHg assuming a right atrial pressure of 15   mmHg, which is consistent with severe pulmonary hypertension.  Aortic Valve: No evidence of aortic valve regurgitation is seen. The   aortic valve is severely calcified/ sclerotic, with peak velocity of 2.64   m/s, mean gradient of 15 mmHg, NAJMA (via VTI) of 0.92 sq cm and   dimensionless index of 0.22, consistent with severe aortic stenosis. This   may be true aortic valve stenosis vs low flow low gradient AS (pseudo   AS). The Dimesionless Index value is 0.22.  Pulmonic Valve: Mild to moderate pulmonic valve regurgitation.  Aorta: There is dilatation of the aortic root and ascending aorta.  Pulmonary Artery: The pulmonary artery is mildly dilated.  Venous: The inferior vena cava was dilated, with respiratory size   variation less than 50%.  In comparison to the previous echocardiogram(s): There are no prior   studies on this patient for comparison purposes.      Summary:   1. Left ventricular ejection fraction, by visual estimation, is 50 to   55%.   2. Technically difficult study.   3. Low normal global left ventricular systolic function.   4. The mitral in-flow pattern reveals no discernable A-wave, therefore   no comment on diastolic function can be made.   5. There is mild concentric left ventricular hypertrophy.   6. Right ventricular volume and pressure overload.   7. Severely enlarged right ventricle.   8. Severely reduced RV systolic function.   9. Severe biatrial enlargement.  10. Severe tricuspid regurgitation.  11. Estimated pulmonary artery systolic pressure is 61.8 mmHg assuming a   right atrial pressure of 15 mmHg, which is consistent with severe   pulmonary hypertension.  12. Mild mitral valve regurgitation.  13. Mild to moderate pulmonic valve regurgitation.  14. The aortic valve is severely calcified/ sclerotic, with peak velocity   of 2.64 m/s, mean gradient of 15 mmHg, NAJMA (via VTI) of 0.92 sq cm and   dimensionless index of 0.22, consistent with severe aortic stenosis. This   may be true aortic valve stenosis vs low flow low gradient AS (pseudo AS).  15. Dilatation of the aortic root and ascending aorta.  16. Mildly dilated pulmonary artery.  17. Endocardial visualization was enhanced with intravenous echo contrast.  18. There are no prior studies on this patient for comparison purposes.    Shawna Albert MD Electronically signed on 10/14/2022 at 10:02:30 AM            < end of copied text >              ACTIVE PROBLEMS:  HEALTH ISSUES - PROBLEM Dx:  Acute respiratory failure with hypoxia    Pulmonary embolism    Anemia of chronic disease    Discharge planning issues    Suspected pulmonary embolism    HLD (hyperlipidemia)    HTN (hypertension)    TIERRA (acute kidney injury)

## 2022-10-14 NOTE — PROGRESS NOTE ADULT - SUBJECTIVE AND OBJECTIVE BOX
[facility] Division of Hospital Medicine  Bismark Garvey MD    Chief Complaint:  Patient is a 77y old  Male who presents with a chief complaint of SLADE (14 Oct 2022 10:41)      SUBJECTIVE / OVERNIGHT EVENTS:  Patient seen and examined at bedside. No acute events reported overnight. No new complaints.    MEDICATIONS  (STANDING):  atorvastatin 20 milliGRAM(s) Oral at bedtime  buMETAnide Injectable 1 milliGRAM(s) IV Push two times a day  heparin  Infusion.  Unit(s)/Hr (18 mL/Hr) IV Continuous <Continuous>  hydrALAZINE 50 milliGRAM(s) Oral two times a day  levothyroxine 50 MICROGram(s) Oral daily  metoprolol succinate ER 50 milliGRAM(s) Oral daily  sodium chloride 0.9%. 1000 milliLiter(s) (50 mL/Hr) IV Continuous <Continuous>    MEDICATIONS  (PRN):  acetaminophen     Tablet .. 650 milliGRAM(s) Oral every 6 hours PRN Temp greater or equal to 38C (100.4F), Mild Pain (1 - 3)  ALBUTerol    0.083% 2.5 milliGRAM(s) Nebulizer every 6 hours PRN Shortness of Breath and/or Wheezing  aluminum hydroxide/magnesium hydroxide/simethicone Suspension 30 milliLiter(s) Oral every 4 hours PRN Dyspepsia  heparin   Injectable 8000 Unit(s) IV Push every 6 hours PRN For aPTT less than 40  heparin   Injectable 4000 Unit(s) IV Push every 6 hours PRN For aPTT between 40 - 57  melatonin 3 milliGRAM(s) Oral at bedtime PRN Insomnia  ondansetron Injectable 4 milliGRAM(s) IV Push every 8 hours PRN Nausea and/or Vomiting        I&O's Summary      PHYSICAL EXAM:  Vital Signs Last 24 Hrs  T(C): 36.4 (14 Oct 2022 11:22), Max: 36.9 (14 Oct 2022 04:11)  T(F): 97.6 (14 Oct 2022 11:22), Max: 98.5 (14 Oct 2022 04:11)  HR: 80 (14 Oct 2022 11:22) (69 - 96)  BP: 117/70 (14 Oct 2022 11:22) (101/63 - 158/65)  BP(mean): --  RR: 19 (14 Oct 2022 11:22) (16 - 24)  SpO2: 97% (14 Oct 2022 11:22) (96% - 100%)    Parameters below as of 14 Oct 2022 11:22  Patient On (Oxygen Delivery Method): room air            CONSTITUTIONAL: NAD  HEENT: NC/AT, PERRL, no JVD  RESPIRATORY: CTA bilaterally, normal effort  CARDIOVASCULAR: RRR, S1/S2+, no m/g/r  ABDOMEN: Nontender to palpation, normoactive bowel sounds, no rebound/guarding  MUSCULOSKELETAL: No edema, cyanosis or deformities.  PSYCH: Calm, affect appropriate.  NEUROLOGY: Awake, alert, no focal neurological deficits.   SKIN: No rashes; no palpable lesions  VASC: Distal pulses palpable    LABS:                        8.5    10.78 )-----------( 244      ( 14 Oct 2022 06:49 )             28.5     10-14    144  |  109<H>  |  87.1<H>  ----------------------------<  115<H>  4.9   |  19.0<L>  |  2.32<H>    Ca    8.7      14 Oct 2022 06:49  Mg     2.5     10-13    TPro  7.2  /  Alb  3.4  /  TBili  0.9  /  DBili  x   /  AST  15  /  ALT  10  /  AlkPhos  89  10-14    PTT - ( 14 Oct 2022 06:49 )  PTT:107.2 sec  CARDIAC MARKERS ( 13 Oct 2022 15:39 )  x     / 0.05 ng/mL / x     / x     / x              CAPILLARY BLOOD GLUCOSE            RADIOLOGY & ADDITIONAL TESTS:  Results Reviewed:   Imaging Personally Reviewed:  Electrocardiogram Personally Reviewed:

## 2022-10-14 NOTE — PHYSICAL THERAPY INITIAL EVALUATION ADULT - PERTINENT HX OF CURRENT PROBLEM, REHAB EVAL
75yo M PMH GERD, HLD, BPH, Hypothyroidism, HTN, mitral valve repair in 2005 w/ JEANMARIE ligation, severe pulm HTN and A.fib not on AC who presents from his outpatient cardiologist office with TTE findings concerning for PE. Now admitted for further workup and management

## 2022-10-14 NOTE — PHYSICAL THERAPY INITIAL EVALUATION ADULT - ASSISTIVE DEVICE FOR TRANSFER: GAIT, REHAB EVAL
pt amb 30' with no device with supervision. decreased step/stride length pt amb 30' with no device with supervision. decreased step/stride length and liliana. pt amb 30' with RW with no change in gait.

## 2022-10-14 NOTE — CHART NOTE - NSCHARTNOTEFT_GEN_A_CORE
Called to insert eubanks on pt who usually self cath's  Pt unable to self cath since yesterday  Pt states this happens when he is constipated  Pt had 3-4 BM's this morning, states he feels better and would like to attempt to straight cath himself  Pt successful with straight cath, 1000ml drained    Pt may continue to straight cath himself  pt needs bowel regimen to prevent constipation  cont care as per primary team  No  intervention required

## 2022-10-14 NOTE — PROGRESS NOTE ADULT - SUBJECTIVE AND OBJECTIVE BOX
PULMONARY PROGRESS NOTE      [id number]    Patient is a 77y old  Male who presents with a chief complaint of SLADE (14 Oct 2022 10:19)      INTERVAL HPI/OVERNIGHT EVENTS:  77-year-old male with a history of hypertension hyperlipidemia, hypothyroidism, status post mitral valve repair in 2005, moderate severe aortic stenosis with possible left ventricular apical thrombus.  Patient was admitted 1 day ago for increasing shortness of breath following several days of increasing edema responsive to oral diuretics.  Pulmonary functions in the past demonstrated mild degree of restriction without airways obstruction despite approximately 21 years of tobacco use DC'd 1980.  History also positive for severe obstructive sleep apnea with an AHI of 40.5 but patient noncompliant with AutoPap.  He has been recently evaluated and Feura Bush for pulmonary hypertension.  He states that he has undergone right heart catheterization which was nonresponsive to: Trial.  Further considerations are being made.    This a.m. denies any complaints of chest pain, palpitations or increased shortness of breath.  He is still pending noncontrast chest CT as well as VQ scan.  He continues to refuse CPAP therapy.    MEDICATIONS  (STANDING):  atorvastatin 20 milliGRAM(s) Oral at bedtime  buMETAnide Injectable 1 milliGRAM(s) IV Push two times a day  heparin  Infusion.  Unit(s)/Hr (18 mL/Hr) IV Continuous <Continuous>  hydrALAZINE 50 milliGRAM(s) Oral two times a day  levothyroxine 50 MICROGram(s) Oral daily  metoprolol succinate ER 50 milliGRAM(s) Oral daily  sodium chloride 0.9%. 1000 milliLiter(s) (50 mL/Hr) IV Continuous <Continuous>      MEDICATIONS  (PRN):  acetaminophen     Tablet .. 650 milliGRAM(s) Oral every 6 hours PRN Temp greater or equal to 38C (100.4F), Mild Pain (1 - 3)  ALBUTerol    0.083% 2.5 milliGRAM(s) Nebulizer every 6 hours PRN Shortness of Breath and/or Wheezing  aluminum hydroxide/magnesium hydroxide/simethicone Suspension 30 milliLiter(s) Oral every 4 hours PRN Dyspepsia  heparin   Injectable 8000 Unit(s) IV Push every 6 hours PRN For aPTT less than 40  heparin   Injectable 4000 Unit(s) IV Push every 6 hours PRN For aPTT between 40 - 57  melatonin 3 milliGRAM(s) Oral at bedtime PRN Insomnia  ondansetron Injectable 4 milliGRAM(s) IV Push every 8 hours PRN Nausea and/or Vomiting      Allergies    No Known Allergies    Intolerances        PAST MEDICAL & SURGICAL HISTORY:  HTN (hypertension)      HLD (hyperlipidemia)      No significant past surgical history          SOCIAL HISTORY  Smoking History:       REVIEW OF SYSTEMS:    CONSTITUTIONAL:  No distress    HEENT:  Eyes:  No diplopia or blurred vision. ENT:  No earache, sore throat or runny nose.    CARDIOVASCULAR:  No pressure, squeezing, tightness, heaviness or aching about the chest; no palpitations.    RESPIRATORY:  above    GASTROINTESTINAL:  No nausea, vomiting or diarrhea.    GENITOURINARY:  No dysuria, frequency or urgency.    NEUROLOGIC:  No paresthesias, fasciculations, seizures or weakness.    Extremities: No cyanosis, clubbing or edema    PSYCHIATRIC:  No disorder of thought or mood.    Vital Signs Last 24 Hrs  T(C): 36.4 (14 Oct 2022 05:38), Max: 36.9 (14 Oct 2022 04:11)  T(F): 97.5 (14 Oct 2022 05:38), Max: 98.5 (14 Oct 2022 04:11)  HR: 69 (14 Oct 2022 05:38) (69 - 96)  BP: 142/72 (14 Oct 2022 05:38) (101/63 - 158/65)  BP(mean): --  RR: 16 (14 Oct 2022 05:38) (16 - 24)  SpO2: 100% (14 Oct 2022 05:38) (96% - 100%)    Parameters below as of 14 Oct 2022 05:38  Patient On (Oxygen Delivery Method): room air        PHYSICAL EXAMINATION:    GENERAL: The patient is awake and alert in no apparent distress. Obese    HEENT: Head is normocephalic and atraumatic. Extraocular muscles are intact. Mucous membranes are moist.    NECK: Supple.    LUNGS: Clear to auscultation without wheezing, rales or rhonchi; respirations unlabored    HEART: Regular rate and rhythm without murmur.    ABDOMEN: Soft, nontender, and nondistended.      EXTREMITIES: 2+ edema    NEUROLOGIC: Grossly intact.    LABS:                        8.5    10.78 )-----------( 244      ( 14 Oct 2022 06:49 )             28.5     10-14    144  |  109<H>  |  87.1<H>  ----------------------------<  115<H>  4.9   |  19.0<L>  |  2.32<H>    Ca    8.7      14 Oct 2022 06:49  Mg     2.5     10-13    TPro  7.2  /  Alb  3.4  /  TBili  0.9  /  DBili  x   /  AST  15  /  ALT  10  /  AlkPhos  89  10-14    PTT - ( 14 Oct 2022 06:49 )  PTT:107.2 sec      CARDIAC MARKERS ( 13 Oct 2022 15:39 )  x     / 0.05 ng/mL / x     / x     / x          D-Dimer Assay, Quantitative: 244 ng/mL DDU (10-13-22 @ 15:39)    Serum Pro-Brain Natriuretic Peptide: 8302 pg/mL (10-13-22 @ 15:39)          MICROBIOLOGY:    RADIOLOGY & ADDITIONAL STUDIES:  < from: US Duplex Venous Lower Ext Complete, Bilateral (10.14.22 @ 04:18) >    ACC: [id number] EXAM:  US DPLX LWR EXT VEINS COMPL BI                          PROCEDURE DATE:  10/14/2022          INTERPRETATION:  CLINICAL INFORMATION: Lower extremity swelling.    COMPARISON: 12/2/2014    TECHNIQUE: Duplex sonography of the BILATERAL LOWER extremity veins with   color and spectral Doppler, with and without compression.    FINDINGS:    RIGHT:  Normal compressibility of the RIGHT common femoral, femoral and popliteal   veins.  Doppler examination shows normal spontaneous and phasic flow.  No RIGHT calf vein thrombosis is detected.    LEFT:  Normal compressibility of the LEFT common femoral, femoral and popliteal   veins.  Doppler examination shows normal spontaneous and phasic flow.  No LEFT calf vein thrombosis is detected.    IMPRESSION:  No evidence of deep venous thrombosis in either lower extremity.          --- End of Report ---            JESUS POP MD; Attending Radiologist  This document has been electronically signed. Oct 14 2022  4:22AM    < end of copied text >  ECHO: < from: TTE Echo Complete w/ Contrast w/ Doppler (10.13.22 @ 18:18) >  PHYSICIAN INTERPRETATION:  Left Ventricle: Endocardial visualization was enhanced with intravenous   echo contrast. The left ventricular internal cavity size is normal.  Global LV systolic function was low normal. Left ventricular ejection   fraction, by visual estimation, is 50 to 55%. The interventricular septum   is flattened in systole and diastole, consistent with right ventricular   pressure and volume overload. The mitral in-flow pattern reveals no   discernable A-wave, therefore no comment on diastolic function can be  made.  Right Ventricle: The right ventricular size is severely enlarged. RV   systolic function is severely reduced. No evidence of RV thrombus.  Left Atrium: Severely enlarged left atrium.  Right Atrium: Severely enlarged right atrium.  Pericardium: There is no evidence of pericardial effusion.  Mitral Valve: Mild mitral valve regurgitation is seen. Known mitral valve   repair.  Tricuspid Valve: The tricuspid valve is normal in structure. Severe   tricuspid regurgitation is visualized. Estimated pulmonary artery   systolic pressure is 61.8 mmHg assuming a right atrial pressure of 15   mmHg, which is consistent with severe pulmonary hypertension.  Aortic Valve: No evidence of aortic valve regurgitation is seen. The   aortic valve is severely calcified/ sclerotic, with peak velocity of 2.64   m/s, mean gradient of 15 mmHg, NAJMA (via VTI) of 0.92 sq cm and   dimensionless index of 0.22, consistent with severe aortic stenosis. This   may be true aortic valve stenosis vs low flow low gradient AS (pseudo   AS). The Dimesionless Index value is 0.22.  Pulmonic Valve: Mild to moderate pulmonic valve regurgitation.  Aorta: There is dilatation of the aortic root and ascending aorta.  Pulmonary Artery: The pulmonary artery is mildly dilated.  Venous: The inferior vena cava was dilated, with respiratory size   variation less than 50%.  In comparison to the previous echocardiogram(s): There are no prior   studies on this patient for comparison purposes.      Summary:   1. Left ventricular ejection fraction, by visual estimation, is 50 to   55%.   2. Technically difficult study.   3. Low normal global left ventricular systolic function.   4. The mitral in-flow pattern reveals no discernable A-wave, therefore   no comment on diastolic function can be made.   5. There is mild concentric left ventricular hypertrophy.   6. Right ventricular volume and pressure overload.   7. Severely enlarged right ventricle.   8. Severely reduced RV systolic function.   9. Severe biatrial enlargement.  10. Severe tricuspid regurgitation.  11. Estimated pulmonary artery systolic pressure is 61.8 mmHg assuming a   right atrial pressure of 15 mmHg, which is consistent with severe   pulmonary hypertension.  12. Mild mitral valve regurgitation.  13. Mild to moderate pulmonic valve regurgitation.  14. The aortic valve is severely calcified/ sclerotic, with peak velocity   of 2.64 m/s, mean gradient of 15 mmHg, NAJMA (via VTI) of 0.92 sq cm and   dimensionless index of 0.22, consistent with severe aortic stenosis. This   may be true aortic valve stenosis vs low flow low gradient AS (pseudo AS).  15. Dilatation of the aortic root and ascending aorta.  16. Mildly dilated pulmonary artery.  17. Endocardial visualization was enhanced with intravenous echo contrast.  18. There are no prior studies on this patient for comparison purposes.    Shawna Albert MD Electronically signed on 10/14/2022 at 10:02:30 AM

## 2022-10-14 NOTE — PROGRESS NOTE ADULT - TIME BILLING
No retinal holes or tears seen on exam. Recommended observation. We reviewed the signs and symptoms of retinal tear/retinal detachment and the importance of prompt evaluation should there be increasing floaters, new flashing lights, or decreasing peripheral vision in either eye at any time. Patient understands condition, prognosis and need for follow up care. Discussed with son at bedside

## 2022-10-15 LAB
ALBUMIN SERPL ELPH-MCNC: 3 G/DL — LOW (ref 3.3–5.2)
ALP SERPL-CCNC: 80 U/L — SIGNIFICANT CHANGE UP (ref 40–120)
ALT FLD-CCNC: 9 U/L — SIGNIFICANT CHANGE UP
ANION GAP SERPL CALC-SCNC: 14 MMOL/L — SIGNIFICANT CHANGE UP (ref 5–17)
APTT BLD: 74.5 SEC — HIGH (ref 27.5–35.5)
APTT BLD: 77 SEC — HIGH (ref 27.5–35.5)
AST SERPL-CCNC: 16 U/L — SIGNIFICANT CHANGE UP
BILIRUB SERPL-MCNC: 0.9 MG/DL — SIGNIFICANT CHANGE UP (ref 0.4–2)
BUN SERPL-MCNC: 80.6 MG/DL — HIGH (ref 8–20)
CALCIUM SERPL-MCNC: 8.2 MG/DL — LOW (ref 8.4–10.5)
CHLORIDE SERPL-SCNC: 108 MMOL/L — HIGH (ref 98–107)
CO2 SERPL-SCNC: 19 MMOL/L — LOW (ref 22–29)
CREAT SERPL-MCNC: 2.1 MG/DL — HIGH (ref 0.5–1.3)
EGFR: 32 ML/MIN/1.73M2 — LOW
GLUCOSE SERPL-MCNC: 93 MG/DL — SIGNIFICANT CHANGE UP (ref 70–99)
HCT VFR BLD CALC: 25.7 % — LOW (ref 39–50)
HGB BLD-MCNC: 7.5 G/DL — LOW (ref 13–17)
MAGNESIUM SERPL-MCNC: 2.4 MG/DL — SIGNIFICANT CHANGE UP (ref 1.6–2.6)
MCHC RBC-ENTMCNC: 26.3 PG — LOW (ref 27–34)
MCHC RBC-ENTMCNC: 29.2 GM/DL — LOW (ref 32–36)
MCV RBC AUTO: 90.2 FL — SIGNIFICANT CHANGE UP (ref 80–100)
PHOSPHATE SERPL-MCNC: 4.7 MG/DL — SIGNIFICANT CHANGE UP (ref 2.4–4.7)
PLATELET # BLD AUTO: 212 K/UL — SIGNIFICANT CHANGE UP (ref 150–400)
POTASSIUM SERPL-MCNC: 4.3 MMOL/L — SIGNIFICANT CHANGE UP (ref 3.5–5.3)
POTASSIUM SERPL-SCNC: 4.3 MMOL/L — SIGNIFICANT CHANGE UP (ref 3.5–5.3)
PROT SERPL-MCNC: 6.5 G/DL — LOW (ref 6.6–8.7)
RBC # BLD: 2.85 M/UL — LOW (ref 4.2–5.8)
RBC # FLD: 17.4 % — HIGH (ref 10.3–14.5)
SODIUM SERPL-SCNC: 141 MMOL/L — SIGNIFICANT CHANGE UP (ref 135–145)
WBC # BLD: 8.39 K/UL — SIGNIFICANT CHANGE UP (ref 3.8–10.5)
WBC # FLD AUTO: 8.39 K/UL — SIGNIFICANT CHANGE UP (ref 3.8–10.5)

## 2022-10-15 PROCEDURE — 99233 SBSQ HOSP IP/OBS HIGH 50: CPT

## 2022-10-15 RX ORDER — ENOXAPARIN SODIUM 100 MG/ML
40 INJECTION SUBCUTANEOUS EVERY 24 HOURS
Refills: 0 | Status: DISCONTINUED | OUTPATIENT
Start: 2022-10-15 | End: 2022-10-17

## 2022-10-15 RX ORDER — POLYETHYLENE GLYCOL 3350 17 G/17G
17 POWDER, FOR SOLUTION ORAL DAILY
Refills: 0 | Status: DISCONTINUED | OUTPATIENT
Start: 2022-10-15 | End: 2022-10-17

## 2022-10-15 RX ORDER — SENNA PLUS 8.6 MG/1
2 TABLET ORAL AT BEDTIME
Refills: 0 | Status: DISCONTINUED | OUTPATIENT
Start: 2022-10-15 | End: 2022-10-17

## 2022-10-15 RX ORDER — LACTULOSE 10 G/15ML
10 SOLUTION ORAL THREE TIMES A DAY
Refills: 0 | Status: DISCONTINUED | OUTPATIENT
Start: 2022-10-15 | End: 2022-10-17

## 2022-10-15 RX ADMIN — SENNA PLUS 2 TABLET(S): 8.6 TABLET ORAL at 21:13

## 2022-10-15 RX ADMIN — Medication 50 MILLIGRAM(S): at 05:46

## 2022-10-15 RX ADMIN — HEPARIN SODIUM 1300 UNIT(S)/HR: 5000 INJECTION INTRAVENOUS; SUBCUTANEOUS at 02:05

## 2022-10-15 RX ADMIN — Medication 50 MILLIGRAM(S): at 17:47

## 2022-10-15 RX ADMIN — BUMETANIDE 1 MILLIGRAM(S): 0.25 INJECTION INTRAMUSCULAR; INTRAVENOUS at 14:05

## 2022-10-15 RX ADMIN — BUMETANIDE 1 MILLIGRAM(S): 0.25 INJECTION INTRAMUSCULAR; INTRAVENOUS at 05:48

## 2022-10-15 RX ADMIN — ENOXAPARIN SODIUM 40 MILLIGRAM(S): 100 INJECTION SUBCUTANEOUS at 14:05

## 2022-10-15 RX ADMIN — ATORVASTATIN CALCIUM 20 MILLIGRAM(S): 80 TABLET, FILM COATED ORAL at 21:13

## 2022-10-15 RX ADMIN — Medication 50 MICROGRAM(S): at 05:46

## 2022-10-15 NOTE — PROGRESS NOTE ADULT - SUBJECTIVE AND OBJECTIVE BOX
PULMONARY PROGRESS NOTE      [id number]    Patient is a 77y old  Male who presents with a chief complaint of SLADE (14 Oct 2022 12:47)      INTERVAL HPI/OVERNIGHT EVENTS:  No acute distress  MEDICATIONS  (STANDING):  atorvastatin 20 milliGRAM(s) Oral at bedtime  buMETAnide Injectable 1 milliGRAM(s) IV Push two times a day  enoxaparin Injectable 40 milliGRAM(s) SubCutaneous every 24 hours  hydrALAZINE 50 milliGRAM(s) Oral two times a day  levothyroxine 50 MICROGram(s) Oral daily  metoprolol succinate ER 50 milliGRAM(s) Oral daily  polyethylene glycol 3350 17 Gram(s) Oral daily  senna 2 Tablet(s) Oral at bedtime      MEDICATIONS  (PRN):  acetaminophen     Tablet .. 650 milliGRAM(s) Oral every 6 hours PRN Temp greater or equal to 38C (100.4F), Mild Pain (1 - 3)  ALBUTerol    0.083% 2.5 milliGRAM(s) Nebulizer every 6 hours PRN Shortness of Breath and/or Wheezing  aluminum hydroxide/magnesium hydroxide/simethicone Suspension 30 milliLiter(s) Oral every 4 hours PRN Dyspepsia  lactulose Syrup 10 Gram(s) Oral three times a day PRN constipation  melatonin 3 milliGRAM(s) Oral at bedtime PRN Insomnia  ondansetron Injectable 4 milliGRAM(s) IV Push every 8 hours PRN Nausea and/or Vomiting      Allergies    No Known Allergies    Intolerances        PAST MEDICAL & SURGICAL HISTORY:  HTN (hypertension)      HLD (hyperlipidemia)      No significant past surgical history          SOCIAL HISTORY  Smoking History:       REVIEW OF SYSTEMS:    CONSTITUTIONAL:  No distress    HEENT:  Eyes:  No diplopia or blurred vision. ENT:  No earache, sore throat or runny nose.    CARDIOVASCULAR:  No pressure, squeezing, tightness, heaviness or aching about the chest; no palpitations.    RESPIRATORY:  No cough, shortness of breath, PND or orthopnea. Mild SOBOE    GASTROINTESTINAL:  No nausea, vomiting or diarrhea.    GENITOURINARY:  No dysuria, frequency or urgency.    NEUROLOGIC:  No paresthesias, fasciculations, seizures or weakness.    Extremities: No cyanosis, clubbing or edema    PSYCHIATRIC:  No disorder of thought or mood.    Vital Signs Last 24 Hrs  T(C): 36.4 (15 Oct 2022 07:33), Max: 36.6 (14 Oct 2022 15:18)  T(F): 97.5 (15 Oct 2022 07:33), Max: 97.9 (15 Oct 2022 05:00)  HR: 73 (15 Oct 2022 07:33) (59 - 105)  BP: 104/62 (15 Oct 2022 07:33) (104/62 - 127/64)  BP(mean): --  RR: 18 (15 Oct 2022 07:33) (18 - 19)  SpO2: 95% (15 Oct 2022 07:33) (95% - 99%)    Parameters below as of 15 Oct 2022 07:33  Patient On (Oxygen Delivery Method): nasal cannula        PHYSICAL EXAMINATION:    GENERAL: The patient is awake and alert in no apparent distress.     HEENT: Head is normocephalic and atraumatic. Extraocular muscles are intact. Mucous membranes are moist.    NECK: Supple.    LUNGS: Clear to auscultation without wheezing, rales or rhonchi; respirations unlabored    HEART: Regular rate and rhythm without murmur.    ABDOMEN: Soft, nontender, and nondistended.      EXTREMITIES: Without any cyanosis, clubbing, rash, lesions or edema.    NEUROLOGIC: Grossly intact.    LABS:                        7.5    8.39  )-----------( 212      ( 15 Oct 2022 06:30 )             25.7     10-15    141  |  108<H>  |  80.6<H>  ----------------------------<  93  4.3   |  19.0<L>  |  2.10<H>    Ca    8.2<L>      15 Oct 2022 04:25  Phos  4.7     10-15  Mg     2.4     10-15    TPro  6.5<L>  /  Alb  3.0<L>  /  TBili  0.9  /  DBili  x   /  AST  16  /  ALT  9   /  AlkPhos  80  10-15    PTT - ( 15 Oct 2022 00:41 )  PTT:77.0 sec    Serum Pro-Brain Natriuretic Peptide (10.13.22 @ 15:39)   Serum Pro-Brain Natriuretic Peptide: 8302 pg/mL   CARDIAC MARKERS ( 13 Oct 2022 15:39 )  x     / 0.05 ng/mL / x     / x     / x            Serum Pro-Brain Natriuretic Peptide: 8302 pg/mL (10-13-22 @ 15:39)          MICROBIOLOGY:    RADIOLOGY & ADDITIONAL STUDIES:  < from: CT Chest No Cont (10.14.22 @ 17:24) >    ACC: [id number] EXAM:  CT CHEST                          PROCEDURE DATE:  10/14/2022          INTERPRETATION:  CLINICAL INFORMATION: Shortness of breath. Pulmonary   hypertension.    COMPARISON: CT abdomen pelvis 11/20/2020..    CONTRAST/COMPLICATIONS:  IV Contrast: NONE  Oral Contrast: NONE  Complications: None reported at time of study completion    PROCEDURE:  CT of the Chest was performed.  Sagittal and coronal reformats were performed.    FINDINGS:    LUNGS AND AIRWAYS: Patent central airways.  Bilateral intralobular and   interlobular septal thickening.  PLEURA: No pleural effusion.  MEDIASTINUM AND FRANCHESCA: No lymphadenopathy.  VESSELS: Atherosclerotic changes. Thoracic aorta is normal in caliber.   Dilatation of the main pulmonary arterymeasuring 3.6 cm.  HEART: Cardiomegaly. No pericardial effusion. Coronary artery and aortic   valve calcification. Status post mitral valve repair.  CHEST WALL AND LOWER NECK: Left anterior chest wall loop recorder device.  VISUALIZED UPPER ABDOMEN: Trace perihepatic ascites. Cholecystectomy. A   2.8 cm indeterminant left renal upper pole lesion is not significantly   changed in size since 2020.  BONES: Median sternotomy. Degenerative changes.      IMPRESSION:  Bilateral interlobular and intralobular septal thickening which may   represent mild pulmonary edema.    VERTEBRAL BODY ANALYSIS: No Vertebral fracture or low bone density   identified.        --- End of Report ---            ROSANNA MORENO MD; Attending Radiologist  This document has been electronically signed. Oct 14 2022  7:15PM    < end of copied text >  < from: NM Pulmonary Ventilation/Perfusion Scan (10.14.22 @ 13:00) >    ACC: [id number] EXAM:  NM PULM VENTILATION PERFUS IMG                          PROCEDURE DATE:  10/14/2022          INTERPRETATION:  CLINICAL INFORMATION: 77 year-old male with dyspnea on   exertion, referred to evaluate for pulmonary embolism.    RADIOPHARMACEUTICAL: 1 mCi Tc-99m-DTPA by inhalation; 6 mCi Tc-99m-MAA,   I.V.    TECHNIQUE:  Ventilation and perfusion images of the lungs were obtained   following administration of Tc-99m-DTPA and Tc-99m-MAA. Images were   obtained in the anterior, posterior, both lateral, and all 4 oblique   projections. This study was interpreted in conjunction with a chest   radiograph of 10/13/2022    COMPARISON: No previous lung V/Q scan for comparison    FINDINGS: There is heterogeneous radiotracer distribution in the lungs on   both the ventilation and the perfusion images. There are no segmental   perfusion defects.      IMPRESSION: Very low probability of pulmonary embolus.    --- End of Report ---            YAZMIN KRISHNA MD; Attending Nuclear Medicine  This document has been electronically signed. Oct 14 2022  1:50PM    < end of copied text >  < from: US Duplex Venous Lower Ext Complete, Bilateral (10.14.22 @ 04:18) >    ACC: [id number] EXAM:  US DPLX LWR EXT VEINS COMPL BI                          PROCEDURE DATE:  10/14/2022          INTERPRETATION:  CLINICAL INFORMATION: Lower extremity swelling.    COMPARISON: 12/2/2014    TECHNIQUE: Duplex sonography of the BILATERAL LOWER extremity veins with   color and spectral Doppler, with and without compression.    FINDINGS:    RIGHT:  Normal compressibility of the RIGHT common femoral, femoral and popliteal   veins.  Doppler examination shows normal spontaneous and phasic flow.  No RIGHT calf vein thrombosis is detected.    LEFT:  Normal compressibility of the LEFT common femoral, femoral and popliteal   veins.  Doppler examination shows normal spontaneous and phasic flow.  No LEFT calf vein thrombosis is detected.    IMPRESSION:  No evidence of deep venous thrombosis in either lower extremity.          --- End of Report ---            JESUS POP MD; Attending Radiologist  This document has been electronically signed. Oct 14 2022  4:22AM    < end of copied text >   PULMONARY PROGRESS NOTE      [facility]ICIA MCCLUREALEXANDRU-[id number]    Patient is a 77y old  Male who presents with a chief complaint of SLADE (14 Oct 2022 12:47)      INTERVAL HPI/OVERNIGHT EVENTS:  No acute distress eating breakfast    MEDICATIONS  (STANDING):  atorvastatin 20 milliGRAM(s) Oral at bedtime  buMETAnide Injectable 1 milliGRAM(s) IV Push two times a day  enoxaparin Injectable 40 milliGRAM(s) SubCutaneous every 24 hours  hydrALAZINE 50 milliGRAM(s) Oral two times a day  levothyroxine 50 MICROGram(s) Oral daily  metoprolol succinate ER 50 milliGRAM(s) Oral daily  polyethylene glycol 3350 17 Gram(s) Oral daily  senna 2 Tablet(s) Oral at bedtime      MEDICATIONS  (PRN):  acetaminophen     Tablet .. 650 milliGRAM(s) Oral every 6 hours PRN Temp greater or equal to 38C (100.4F), Mild Pain (1 - 3)  ALBUTerol    0.083% 2.5 milliGRAM(s) Nebulizer every 6 hours PRN Shortness of Breath and/or Wheezing  aluminum hydroxide/magnesium hydroxide/simethicone Suspension 30 milliLiter(s) Oral every 4 hours PRN Dyspepsia  lactulose Syrup 10 Gram(s) Oral three times a day PRN constipation  melatonin 3 milliGRAM(s) Oral at bedtime PRN Insomnia  ondansetron Injectable 4 milliGRAM(s) IV Push every 8 hours PRN Nausea and/or Vomiting      Allergies    No Known Allergies    Intolerances        PAST MEDICAL & SURGICAL HISTORY:  HTN (hypertension)      HLD (hyperlipidemia)      No significant past surgical history          SOCIAL HISTORY  Smoking History:       REVIEW OF SYSTEMS:    CONSTITUTIONAL:  No distress    HEENT:  Eyes:  No diplopia or blurred vision. ENT:  No earache, sore throat or runny nose.    CARDIOVASCULAR:  No pressure, squeezing, tightness, heaviness or aching about the chest; no palpitations.    RESPIRATORY:  No cough, shortness of breath, PND or orthopnea. Mild SOBOE    GASTROINTESTINAL:  No nausea, vomiting or diarrhea.    GENITOURINARY:  No dysuria, frequency or urgency.    NEUROLOGIC:  No paresthesias, fasciculations, seizures or weakness.    Extremities: No cyanosis, clubbing or edema    PSYCHIATRIC:  No disorder of thought or mood.    Vital Signs Last 24 Hrs  T(C): 36.4 (15 Oct 2022 07:33), Max: 36.6 (14 Oct 2022 15:18)  T(F): 97.5 (15 Oct 2022 07:33), Max: 97.9 (15 Oct 2022 05:00)  HR: 73 (15 Oct 2022 07:33) (59 - 105)  BP: 104/62 (15 Oct 2022 07:33) (104/62 - 127/64)  BP(mean): --  RR: 18 (15 Oct 2022 07:33) (18 - 19)  SpO2: 95% (15 Oct 2022 07:33) (95% - 99%)    Parameters below as of 15 Oct 2022 07:33  Patient On (Oxygen Delivery Method): nasal cannula        PHYSICAL EXAMINATION:    GENERAL: The patient is awake and alert in no apparent distress.     HEENT: Head is normocephalic and atraumatic. Extraocular muscles are intact. Mucous membranes are moist.    NECK: Supple.    LUNGS: Clear to auscultation without wheezing, rales or rhonchi; respirations unlabored    HEART: Regular rate and rhythm without murmur.    ABDOMEN: Soft, nontender, and nondistended.      EXTREMITIES: Without any cyanosis, clubbing, rash, lesions or edema.    NEUROLOGIC: Grossly intact.    LABS:                        7.5    8.39  )-----------( 212      ( 15 Oct 2022 06:30 )             25.7     10-15    141  |  108<H>  |  80.6<H>  ----------------------------<  93  4.3   |  19.0<L>  |  2.10<H>    Ca    8.2<L>      15 Oct 2022 04:25  Phos  4.7     10-15  Mg     2.4     10-15    TPro  6.5<L>  /  Alb  3.0<L>  /  TBili  0.9  /  DBili  x   /  AST  16  /  ALT  9   /  AlkPhos  80  10-15    PTT - ( 15 Oct 2022 00:41 )  PTT:77.0 sec    Serum Pro-Brain Natriuretic Peptide (10.13.22 @ 15:39)   Serum Pro-Brain Natriuretic Peptide: 8302 pg/mL   CARDIAC MARKERS ( 13 Oct 2022 15:39 )  x     / 0.05 ng/mL / x     / x     / x            Serum Pro-Brain Natriuretic Peptide: 8302 pg/mL (10-13-22 @ 15:39)          MICROBIOLOGY:    RADIOLOGY & ADDITIONAL STUDIES:  < from: CT Chest No Cont (10.14.22 @ 17:24) >    ACC: [id number] EXAM:  CT CHEST                          PROCEDURE DATE:  10/14/2022          INTERPRETATION:  CLINICAL INFORMATION: Shortness of breath. Pulmonary   hypertension.    COMPARISON: CT abdomen pelvis 11/20/2020..    CONTRAST/COMPLICATIONS:  IV Contrast: NONE  Oral Contrast: NONE  Complications: None reported at time of study completion    PROCEDURE:  CT of the Chest was performed.  Sagittal and coronal reformats were performed.    FINDINGS:    LUNGS AND AIRWAYS: Patent central airways.  Bilateral intralobular and   interlobular septal thickening.  PLEURA: No pleural effusion.  MEDIASTINUM AND FRANCHESCA: No lymphadenopathy.  VESSELS: Atherosclerotic changes. Thoracic aorta is normal in caliber.   Dilatation of the main pulmonary arterymeasuring 3.6 cm.  HEART: Cardiomegaly. No pericardial effusion. Coronary artery and aortic   valve calcification. Status post mitral valve repair.  CHEST WALL AND LOWER NECK: Left anterior chest wall loop recorder device.  VISUALIZED UPPER ABDOMEN: Trace perihepatic ascites. Cholecystectomy. A   2.8 cm indeterminant left renal upper pole lesion is not significantly   changed in size since 2020.  BONES: Median sternotomy. Degenerative changes.      IMPRESSION:  Bilateral interlobular and intralobular septal thickening which may   represent mild pulmonary edema.    VERTEBRAL BODY ANALYSIS: No Vertebral fracture or low bone density   identified.        --- End of Report ---            ROSANNA MORENO MD; Attending Radiologist  This document has been electronically signed. Oct 14 2022  7:15PM    < end of copied text >  < from: NM Pulmonary Ventilation/Perfusion Scan (10.14.22 @ 13:00) >    ACC: [id number] EXAM:  NM PULM VENTILATION PERFUS IMG                          PROCEDURE DATE:  10/14/2022          INTERPRETATION:  CLINICAL INFORMATION: 77 year-old male with dyspnea on   exertion, referred to evaluate for pulmonary embolism.    RADIOPHARMACEUTICAL: 1 mCi Tc-99m-DTPA by inhalation; 6 mCi Tc-99m-MAA,   I.V.    TECHNIQUE:  Ventilation and perfusion images of the lungs were obtained   following administration of Tc-99m-DTPA and Tc-99m-MAA. Images were   obtained in the anterior, posterior, both lateral, and all 4 oblique   projections. This study was interpreted in conjunction with a chest   radiograph of 10/13/2022    COMPARISON: No previous lung V/Q scan for comparison    FINDINGS: There is heterogeneous radiotracer distribution in the lungs on   both the ventilation and the perfusion images. There are no segmental   perfusion defects.      IMPRESSION: Very low probability of pulmonary embolus.    --- End of Report ---            YAZMIN KRISHNA MD; Attending Nuclear Medicine  This document has been electronically signed. Oct 14 2022  1:50PM    < end of copied text >  < from: US Duplex Venous Lower Ext Complete, Bilateral (10.14.22 @ 04:18) >    ACC: [id number] EXAM:  US DPLX LWR EXT VEINS COMPL BI                          PROCEDURE DATE:  10/14/2022          INTERPRETATION:  CLINICAL INFORMATION: Lower extremity swelling.    COMPARISON: 12/2/2014    TECHNIQUE: Duplex sonography of the BILATERAL LOWER extremity veins with   color and spectral Doppler, with and without compression.    FINDINGS:    RIGHT:  Normal compressibility of the RIGHT common femoral, femoral and popliteal   veins.  Doppler examination shows normal spontaneous and phasic flow.  No RIGHT calf vein thrombosis is detected.    LEFT:  Normal compressibility of the LEFT common femoral, femoral and popliteal   veins.  Doppler examination shows normal spontaneous and phasic flow.  No LEFT calf vein thrombosis is detected.    IMPRESSION:  No evidence of deep venous thrombosis in either lower extremity.          --- End of Report ---            JESUS POP MD; Attending Radiologist  This document has been electronically signed. Oct 14 2022  4:22AM    < end of copied text >

## 2022-10-15 NOTE — PROGRESS NOTE ADULT - SUBJECTIVE AND OBJECTIVE BOX
LETICIA MCCLURE    [id number]    77y      Male    INTERVAL HPI/OVERNIGHT EVENTS: patient being seen for sob. Patient seen at bedside and states feeling better    patient is s.p neg vq scan and taken off heparin drip  patient is now planned for dobutamine echo monday     REVIEW OF SYSTEMS:    CONSTITUTIONAL: No fever, weight loss, or fatigue  RESPIRATORY: No cough, wheezing, hemoptysis; No shortness of breath  CARDIOVASCULAR: No chest pain, palpitations  GASTROINTESTINAL: No abdominal or epigastric pain. No nausea, vomiting  NEUROLOGICAL: No headaches, memory loss, loss of strength.  MISCELLANEOUS:      Vital Signs Last 24 Hrs  T(C): 36.6 (15 Oct 2022 11:13), Max: 36.6 (14 Oct 2022 15:18)  T(F): 97.8 (15 Oct 2022 11:13), Max: 97.9 (15 Oct 2022 05:00)  HR: 81 (15 Oct 2022 11:13) (59 - 105)  BP: 102/61 (15 Oct 2022 11:13) (102/61 - 127/64)  BP(mean): --  RR: 18 (15 Oct 2022 11:13) (18 - 18)  SpO2: 95% (15 Oct 2022 11:13) (95% - 99%)    Parameters below as of 15 Oct 2022 11:13  Patient On (Oxygen Delivery Method): nasal cannula        PHYSICAL EXAM:      CONSTITUTIONAL: NAD  HEENT: NC/AT, PERRL, no JVD  RESPIRATORY: CTA bilaterally, normal effort  CARDIOVASCULAR: RRR, S1/S2+, no m/g/r  ABDOMEN: Nontender to palpation, normoactive bowel sounds, no rebound/guarding  MUSCULOSKELETAL: No edema, cyanosis or deformities.  PSYCH: Calm, affect appropriate.  NEUROLOGY: Awake, alert, no focal neurological deficits.   SKIN: No rashes; no palpable lesions  VASC: Distal pulses palpable        LABS:                        7.5    8.39  )-----------( 212      ( 15 Oct 2022 06:30 )             25.7     10-15    141  |  108<H>  |  80.6<H>  ----------------------------<  93  4.3   |  19.0<L>  |  2.10<H>    Ca    8.2<L>      15 Oct 2022 04:25  Phos  4.7     10-15  Mg     2.4     10-15    TPro  6.5<L>  /  Alb  3.0<L>  /  TBili  0.9  /  DBili  x   /  AST  16  /  ALT  9   /  AlkPhos  80  10-15    PTT - ( 15 Oct 2022 08:50 )  PTT:74.5 sec        MEDICATIONS  (STANDING):  atorvastatin 20 milliGRAM(s) Oral at bedtime  buMETAnide Injectable 1 milliGRAM(s) IV Push two times a day  enoxaparin Injectable 40 milliGRAM(s) SubCutaneous every 24 hours  hydrALAZINE 50 milliGRAM(s) Oral two times a day  levothyroxine 50 MICROGram(s) Oral daily  metoprolol succinate ER 50 milliGRAM(s) Oral daily  polyethylene glycol 3350 17 Gram(s) Oral daily  senna 2 Tablet(s) Oral at bedtime    MEDICATIONS  (PRN):  acetaminophen     Tablet .. 650 milliGRAM(s) Oral every 6 hours PRN Temp greater or equal to 38C (100.4F), Mild Pain (1 - 3)  ALBUTerol    0.083% 2.5 milliGRAM(s) Nebulizer every 6 hours PRN Shortness of Breath and/or Wheezing  aluminum hydroxide/magnesium hydroxide/simethicone Suspension 30 milliLiter(s) Oral every 4 hours PRN Dyspepsia  lactulose Syrup 10 Gram(s) Oral three times a day PRN constipation  melatonin 3 milliGRAM(s) Oral at bedtime PRN Insomnia  ondansetron Injectable 4 milliGRAM(s) IV Push every 8 hours PRN Nausea and/or Vomiting      RADIOLOGY & ADDITIONAL TESTS:

## 2022-10-15 NOTE — PROGRESS NOTE ADULT - SUBJECTIVE AND OBJECTIVE BOX
[place] [facility], [facility]                                   [address] [place] [address]                                                      PHONE: [phone]                                                         FAX: [phone]  [email or web address]  ---------------------------------------------------------------------------------------------------------------------------------    Overnight events/patient complaints:    Remains in the ER  No CP his SOB had improved able to speak full sentences   s/p 1 Unit of PRBC  TTE no RV thrombus severe AS vs Low flow low gradient AS   VQ very low prob PE    PAST MEDICAL & SURGICAL HISTORY:  HTN (hypertension)      HLD (hyperlipidemia)      No significant past surgical history        No Known Allergies    MEDICATIONS  (STANDING):  atorvastatin 20 milliGRAM(s) Oral at bedtime  buMETAnide Injectable 1 milliGRAM(s) IV Push two times a day  heparin  Infusion.  Unit(s)/Hr (18 mL/Hr) IV Continuous <Continuous>  hydrALAZINE 50 milliGRAM(s) Oral two times a day  levothyroxine 50 MICROGram(s) Oral daily  metoprolol succinate ER 50 milliGRAM(s) Oral daily  sodium chloride 0.9%. 1000 milliLiter(s) (50 mL/Hr) IV Continuous <Continuous>    MEDICATIONS  (PRN):  acetaminophen     Tablet .. 650 milliGRAM(s) Oral every 6 hours PRN Temp greater or equal to 38C (100.4F), Mild Pain (1 - 3)  ALBUTerol    0.083% 2.5 milliGRAM(s) Nebulizer every 6 hours PRN Shortness of Breath and/or Wheezing  aluminum hydroxide/magnesium hydroxide/simethicone Suspension 30 milliLiter(s) Oral every 4 hours PRN Dyspepsia  heparin   Injectable 8000 Unit(s) IV Push every 6 hours PRN For aPTT less than 40  heparin   Injectable 4000 Unit(s) IV Push every 6 hours PRN For aPTT between 40 - 57  melatonin 3 milliGRAM(s) Oral at bedtime PRN Insomnia  ondansetron Injectable 4 milliGRAM(s) IV Push every 8 hours PRN Nausea and/or Vomiting      Vital Signs Last 24 Hrs  T(C): 36.4 (14 Oct 2022 05:38), Max: 36.9 (14 Oct 2022 04:11)  T(F): 97.5 (14 Oct 2022 05:38), Max: 98.5 (14 Oct 2022 04:11)  HR: 69 (14 Oct 2022 05:38) (69 - 96)  BP: 142/72 (14 Oct 2022 05:38) (101/63 - 158/65)  BP(mean): --  RR: 16 (14 Oct 2022 05:38) (16 - 24)  SpO2: 100% (14 Oct 2022 05:38) (96% - 100%)    Parameters below as of 14 Oct 2022 05:38  Patient On (Oxygen Delivery Method): room air      ICU Vital Signs Last 24 Hrs  LETICIA MCCLURE  I&O's Detail    I&O's Summary    Drug Dosing Weight  LETICIA MCCLURE    REVIEW OF SYSTEMS:    Constitutional: No fever, weight loss or fatigue  Eyes: No eye pain, visual disturbances, or discharge  ENMT:  No difficulty hearing, tinnitus, vertigo; No sinus or throat pain  Neck: No pain or stiffness  Respiratory: No cough, wheezing, chills or hemoptysis  Cardiovascular: No chest pain, palpitations, shortness of breath, dizziness or leg swelling  Gastrointestinal: No abdominal or epigastric pain. No nausea, vomiting or hematemesis; No diarrhea or constipation. No melena or hematochezia.  Genitourinary: No dysuria, frequency, hematuria or incontinence  Rectal: No pain, hemorrhoids or incontinence  Neurological: No headaches, memory loss, loss of strength, numbness or tremors  Skin: No itching, burning, rashes or lesions   Lymph Nodes: No enlarged glands  Endocrine: No heat or cold intolerance; No hair loss  Musculoskeletal: No joint pain or swelling; No muscle, back or extremity pain  Psychiatric: No depression, anxiety, mood swings or difficulty sleeping  Heme/Lymph: No easy bruising or bleeding gums  Allergy and Immunologic: No hives or eczema    PHYSICAL EXAM:  General: Appears alert and cooperative.  HEENT: Head; normocephalic, atraumatic.  Eyes: Pupils reactive, cornea wnl.  Neck: Supple, no nodes adenopathy, no NVD or carotid bruit or thyromegaly.  CARDIOVASCULAR: Normal S1 and S2,  SEM3-6 murmur, rub, gallop or lift.   LUNGS: No rales, rhonchi or wheeze. Normal breath sounds bilaterally.  ABDOMEN: Soft, nontender without mass or organomegaly. bowel sounds normoactive.  EXTREMITIES: No clubbing, cyanosis or edema. Distal pulses wnl.   SKIN: warm and dry with normal turgor.  NEURO: Alert/oriented x 3/normal motor exam. No pathologic reflexes.    PSYCH: normal affect.        LABS:                        8.5    10.78 )-----------( 244      ( 14 Oct 2022 06:49 )             28.5     10-14    144  |  109<H>  |  87.1<H>  ----------------------------<  115<H>  4.9   |  19.0<L>  |  2.32<H>    Ca    8.7      14 Oct 2022 06:49  Mg     2.5     10-13    TPro  7.2  /  Alb  3.4  /  TBili  0.9  /  DBili  x   /  AST  15  /  ALT  10  /  AlkPhos  89  10-14    LETICIA MCCLURE  CARDIAC MARKERS ( 13 Oct 2022 15:39 )  x     / 0.05 ng/mL / x     / x     / x          PTT - ( 14 Oct 2022 06:49 )  PTT:107.2 sec      RADIOLOGY & ADDITIONAL STUDIES:    INTERPRETATION OF TELEMETRY (personally reviewed):    ECG:    ECHO: < from: TTE Echo Complete w/ Contrast w/ Doppler (10.13.22 @ 18:18) >  PHYSICIAN INTERPRETATION:  Left Ventricle: Endocardial visualization was enhanced with intravenous   echo contrast. The left ventricular internal cavity size is normal.  Global LV systolic function was low normal. Left ventricular ejection   fraction, by visual estimation, is 50 to 55%. The interventricular septum   is flattened in systole and diastole, consistent with right ventricular   pressure and volume overload. The mitral in-flow pattern reveals no   discernable A-wave, therefore no comment on diastolic function can be  made.  Right Ventricle: The right ventricular size is severely enlarged. RV   systolic function is severely reduced. No evidence of RV thrombus.  Left Atrium: Severely enlarged left atrium.  Right Atrium: Severely enlarged right atrium.  Pericardium: There is no evidence of pericardial effusion.  Mitral Valve: Mild mitral valve regurgitation is seen. Known mitral valve   repair.  Tricuspid Valve: The tricuspid valve is normal in structure. Severe   tricuspid regurgitation is visualized. Estimated pulmonary artery   systolic pressure is 61.8 mmHg assuming a right atrial pressure of 15   mmHg, which is consistent with severe pulmonary hypertension.  Aortic Valve: No evidence of aortic valve regurgitation is seen. The   aortic valve is severely calcified/ sclerotic, with peak velocity of 2.64   m/s, mean gradient of 15 mmHg, NAJMA (via VTI) of 0.92 sq cm and   dimensionless index of 0.22, consistent with severe aortic stenosis. This   may be true aortic valve stenosis vs low flow low gradient AS (pseudo   AS). The Dimesionless Index value is 0.22.  Pulmonic Valve: Mild to moderate pulmonic valve regurgitation.  Aorta: There is dilatation of the aortic root and ascending aorta.  Pulmonary Artery: The pulmonary artery is mildly dilated.  Venous: The inferior vena cava was dilated, with respiratory size   variation less than 50%.  In comparison to the previous echocardiogram(s): There are no prior   studies on this patient for comparison purposes.      Summary:   1. Left ventricular ejection fraction, by visual estimation, is 50 to   55%.   2. Technically difficult study.   3. Low normal global left ventricular systolic function.   4. The mitral in-flow pattern reveals no discernable A-wave, therefore   no comment on diastolic function can be made.   5. There is mild concentric left ventricular hypertrophy.   6. Right ventricular volume and pressure overload.   7. Severely enlarged right ventricle.   8. Severely reduced RV systolic function.   9. Severe biatrial enlargement.  10. Severe tricuspid regurgitation.  11. Estimated pulmonary artery systolic pressure is 61.8 mmHg assuming a   right atrial pressure of 15 mmHg, which is consistent with severe   pulmonary hypertension.  12. Mild mitral valve regurgitation.  13. Mild to moderate pulmonic valve regurgitation.  14. The aortic valve is severely calcified/ sclerotic, with peak velocity   of 2.64 m/s, mean gradient of 15 mmHg, NAJMA (via VTI) of 0.92 sq cm and   dimensionless index of 0.22, consistent with severe aortic stenosis. This   may be true aortic valve stenosis vs low flow low gradient AS (pseudo AS).  15. Dilatation of the aortic root and ascending aorta.  16. Mildly dilated pulmonary artery.  17. Endocardial visualization was enhanced with intravenous echo contrast.  18. There are no prior studies on this patient for comparison purposes.    Shawna Albert MD Electronically signed on 10/14/2022 at 10:02:30 AM            < end of copied text >              ACTIVE PROBLEMS:  HEALTH ISSUES - PROBLEM Dx:  Acute respiratory failure with hypoxia    Pulmonary embolism    Anemia of chronic disease    Discharge planning issues    Suspected pulmonary embolism    HLD (hyperlipidemia)    HTN (hypertension)    TIERRA (acute kidney injury)

## 2022-10-15 NOTE — PROGRESS NOTE ADULT - PROBLEM SELECTOR PROBLEM 4
Pulmonary hypertension due to left heart valvular disease
Pulmonary hypertension due to left heart valvular disease

## 2022-10-15 NOTE — CHART NOTE - NSCHARTNOTEFT_GEN_A_CORE
Called by RN pt unable to straight cath.  Refusing to allow anyone but urology to attempt eubanks.  Urology team notified of this; will see pt.

## 2022-10-16 LAB
ALBUMIN SERPL ELPH-MCNC: 3 G/DL — LOW (ref 3.3–5.2)
ALP SERPL-CCNC: 86 U/L — SIGNIFICANT CHANGE UP (ref 40–120)
ALT FLD-CCNC: 7 U/L — SIGNIFICANT CHANGE UP
ANION GAP SERPL CALC-SCNC: 13 MMOL/L — SIGNIFICANT CHANGE UP (ref 5–17)
AST SERPL-CCNC: 15 U/L — SIGNIFICANT CHANGE UP
BILIRUB SERPL-MCNC: 0.8 MG/DL — SIGNIFICANT CHANGE UP (ref 0.4–2)
BLD GP AB SCN SERPL QL: SIGNIFICANT CHANGE UP
BUN SERPL-MCNC: 83 MG/DL — HIGH (ref 8–20)
CALCIUM SERPL-MCNC: 8.4 MG/DL — SIGNIFICANT CHANGE UP (ref 8.4–10.5)
CHLORIDE SERPL-SCNC: 109 MMOL/L — HIGH (ref 98–107)
CO2 SERPL-SCNC: 20 MMOL/L — LOW (ref 22–29)
CREAT SERPL-MCNC: 2.12 MG/DL — HIGH (ref 0.5–1.3)
EGFR: 32 ML/MIN/1.73M2 — LOW
GLUCOSE SERPL-MCNC: 94 MG/DL — SIGNIFICANT CHANGE UP (ref 70–99)
HCT VFR BLD CALC: 26.4 % — LOW (ref 39–50)
HGB BLD-MCNC: 7.9 G/DL — LOW (ref 13–17)
MAGNESIUM SERPL-MCNC: 2.3 MG/DL — SIGNIFICANT CHANGE UP (ref 1.6–2.6)
MCHC RBC-ENTMCNC: 27.1 PG — SIGNIFICANT CHANGE UP (ref 27–34)
MCHC RBC-ENTMCNC: 29.9 GM/DL — LOW (ref 32–36)
MCV RBC AUTO: 90.4 FL — SIGNIFICANT CHANGE UP (ref 80–100)
PHOSPHATE SERPL-MCNC: 4.5 MG/DL — SIGNIFICANT CHANGE UP (ref 2.4–4.7)
PLATELET # BLD AUTO: 215 K/UL — SIGNIFICANT CHANGE UP (ref 150–400)
POTASSIUM SERPL-MCNC: 4 MMOL/L — SIGNIFICANT CHANGE UP (ref 3.5–5.3)
POTASSIUM SERPL-SCNC: 4 MMOL/L — SIGNIFICANT CHANGE UP (ref 3.5–5.3)
PROT SERPL-MCNC: 6.1 G/DL — LOW (ref 6.6–8.7)
RBC # BLD: 2.92 M/UL — LOW (ref 4.2–5.8)
RBC # FLD: 17.4 % — HIGH (ref 10.3–14.5)
SODIUM SERPL-SCNC: 142 MMOL/L — SIGNIFICANT CHANGE UP (ref 135–145)
WBC # BLD: 7.48 K/UL — SIGNIFICANT CHANGE UP (ref 3.8–10.5)
WBC # FLD AUTO: 7.48 K/UL — SIGNIFICANT CHANGE UP (ref 3.8–10.5)

## 2022-10-16 PROCEDURE — 99232 SBSQ HOSP IP/OBS MODERATE 35: CPT

## 2022-10-16 RX ADMIN — ENOXAPARIN SODIUM 40 MILLIGRAM(S): 100 INJECTION SUBCUTANEOUS at 14:45

## 2022-10-16 RX ADMIN — Medication 50 MILLIGRAM(S): at 18:07

## 2022-10-16 RX ADMIN — BUMETANIDE 1 MILLIGRAM(S): 0.25 INJECTION INTRAMUSCULAR; INTRAVENOUS at 05:33

## 2022-10-16 RX ADMIN — Medication 50 MILLIGRAM(S): at 05:33

## 2022-10-16 RX ADMIN — Medication 50 MILLIGRAM(S): at 05:34

## 2022-10-16 RX ADMIN — BUMETANIDE 1 MILLIGRAM(S): 0.25 INJECTION INTRAMUSCULAR; INTRAVENOUS at 14:45

## 2022-10-16 RX ADMIN — Medication 50 MICROGRAM(S): at 05:34

## 2022-10-16 RX ADMIN — ATORVASTATIN CALCIUM 20 MILLIGRAM(S): 80 TABLET, FILM COATED ORAL at 21:58

## 2022-10-16 NOTE — PROGRESS NOTE ADULT - SUBJECTIVE AND OBJECTIVE BOX
LETICIA MCCLURE    [id number]    77y      Male    INTERVAL HPI/OVERNIGHT EVENTS: patient seen at bedside and states feeling well.     REVIEW OF SYSTEMS:    CONSTITUTIONAL: No fever, weight loss, or fatigue  RESPIRATORY: No cough, wheezing, hemoptysis; No shortness of breath  CARDIOVASCULAR: No chest pain, palpitations  GASTROINTESTINAL: No abdominal or epigastric pain. No nausea, vomiting  NEUROLOGICAL: No headaches, memory loss, loss of strength.  MISCELLANEOUS:      Vital Signs Last 24 Hrs  T(C): 36.4 (16 Oct 2022 05:15), Max: 36.7 (15 Oct 2022 15:32)  T(F): 97.5 (16 Oct 2022 05:15), Max: 98 (15 Oct 2022 15:32)  HR: 87 (16 Oct 2022 05:15) (80 - 88)  BP: 134/74 (16 Oct 2022 05:15) (113/67 - 134/74)  BP(mean): --  RR: 18 (16 Oct 2022 05:15) (17 - 18)  SpO2: 97% (16 Oct 2022 05:15) (93% - 99%)    Parameters below as of 16 Oct 2022 05:15  Patient On (Oxygen Delivery Method): room air        PHYSICAL EXAM:    CONSTITUTIONAL: NAD  HEENT: NC/AT, PERRL, no JVD  RESPIRATORY: CTA bilaterally, normal effort  CARDIOVASCULAR: RRR, S1/S2+, no m/g/r  ABDOMEN: Nontender to palpation, normoactive bowel sounds, no rebound/guarding  MUSCULOSKELETAL: No edema, cyanosis or deformities.  PSYCH: Calm, affect appropriate.  NEUROLOGY: Awake, alert, no focal neurological deficits.   SKIN: No rashes; no palpable lesions  VASC: Distal pulses palpable      LABS:                        7.9    7.48  )-----------( 215      ( 16 Oct 2022 04:47 )             26.4     10-16    142  |  109<H>  |  83.0<H>  ----------------------------<  94  4.0   |  20.0<L>  |  2.12<H>    Ca    8.4      16 Oct 2022 04:47  Phos  4.5     10-16  Mg     2.3     10-16    TPro  6.1<L>  /  Alb  3.0<L>  /  TBili  0.8  /  DBili  x   /  AST  15  /  ALT  7   /  AlkPhos  86  10-16    PTT - ( 15 Oct 2022 08:50 )  PTT:74.5 sec        MEDICATIONS  (STANDING):  atorvastatin 20 milliGRAM(s) Oral at bedtime  buMETAnide Injectable 1 milliGRAM(s) IV Push two times a day  enoxaparin Injectable 40 milliGRAM(s) SubCutaneous every 24 hours  hydrALAZINE 50 milliGRAM(s) Oral two times a day  levothyroxine 50 MICROGram(s) Oral daily  metoprolol succinate ER 50 milliGRAM(s) Oral daily  polyethylene glycol 3350 17 Gram(s) Oral daily  senna 2 Tablet(s) Oral at bedtime    MEDICATIONS  (PRN):  acetaminophen     Tablet .. 650 milliGRAM(s) Oral every 6 hours PRN Temp greater or equal to 38C (100.4F), Mild Pain (1 - 3)  ALBUTerol    0.083% 2.5 milliGRAM(s) Nebulizer every 6 hours PRN Shortness of Breath and/or Wheezing  aluminum hydroxide/magnesium hydroxide/simethicone Suspension 30 milliLiter(s) Oral every 4 hours PRN Dyspepsia  lactulose Syrup 10 Gram(s) Oral three times a day PRN constipation  melatonin 3 milliGRAM(s) Oral at bedtime PRN Insomnia  ondansetron Injectable 4 milliGRAM(s) IV Push every 8 hours PRN Nausea and/or Vomiting      RADIOLOGY & ADDITIONAL TESTS:

## 2022-10-16 NOTE — PROGRESS NOTE ADULT - SUBJECTIVE AND OBJECTIVE BOX
[place] [facility], [facility]                                   [address] [place] [address]                                                      PHONE: [phone]                                                         FAX: [phone]  [email or web address]  ---------------------------------------------------------------------------------------------------------------------------------    Overnight events/patient complaints:    Presently on 4T  No CP his SOB had improved able to speak full sentences but reports exertional SOB  s/p 1 Unit of PRBC  TTE no RV thrombus severe AS vs Low flow low gradient AS   VQ very low prob PE    PAST MEDICAL & SURGICAL HISTORY:  HTN (hypertension)      HLD (hyperlipidemia)      No significant past surgical history        No Known Allergies    MEDICATIONS  (STANDING):  atorvastatin 20 milliGRAM(s) Oral at bedtime  buMETAnide Injectable 1 milliGRAM(s) IV Push two times a day  heparin  Infusion.  Unit(s)/Hr (18 mL/Hr) IV Continuous <Continuous>  hydrALAZINE 50 milliGRAM(s) Oral two times a day  levothyroxine 50 MICROGram(s) Oral daily  metoprolol succinate ER 50 milliGRAM(s) Oral daily  sodium chloride 0.9%. 1000 milliLiter(s) (50 mL/Hr) IV Continuous <Continuous>    MEDICATIONS  (PRN):  acetaminophen     Tablet .. 650 milliGRAM(s) Oral every 6 hours PRN Temp greater or equal to 38C (100.4F), Mild Pain (1 - 3)  ALBUTerol    0.083% 2.5 milliGRAM(s) Nebulizer every 6 hours PRN Shortness of Breath and/or Wheezing  aluminum hydroxide/magnesium hydroxide/simethicone Suspension 30 milliLiter(s) Oral every 4 hours PRN Dyspepsia  heparin   Injectable 8000 Unit(s) IV Push every 6 hours PRN For aPTT less than 40  heparin   Injectable 4000 Unit(s) IV Push every 6 hours PRN For aPTT between 40 - 57  melatonin 3 milliGRAM(s) Oral at bedtime PRN Insomnia  ondansetron Injectable 4 milliGRAM(s) IV Push every 8 hours PRN Nausea and/or Vomiting      Vital Signs Last 24 Hrs  T(C): 36.4 (14 Oct 2022 05:38), Max: 36.9 (14 Oct 2022 04:11)  T(F): 97.5 (14 Oct 2022 05:38), Max: 98.5 (14 Oct 2022 04:11)  HR: 69 (14 Oct 2022 05:38) (69 - 96)  BP: 142/72 (14 Oct 2022 05:38) (101/63 - 158/65)  BP(mean): --  RR: 16 (14 Oct 2022 05:38) (16 - 24)  SpO2: 100% (14 Oct 2022 05:38) (96% - 100%)    Parameters below as of 14 Oct 2022 05:38  Patient On (Oxygen Delivery Method): room air      ICU Vital Signs Last 24 Hrs  LETICIA MCCLURE  I&O's Detail    I&O's Summary    Drug Dosing Weight  LETICIA MCCLURE    REVIEW OF SYSTEMS:    Constitutional: No fever, weight loss or fatigue  Eyes: No eye pain, visual disturbances, or discharge  ENMT:  No difficulty hearing, tinnitus, vertigo; No sinus or throat pain  Neck: No pain or stiffness  Respiratory: No cough, wheezing, chills or hemoptysis  Cardiovascular: No chest pain, palpitations, shortness of breath, dizziness or leg swelling  Gastrointestinal: No abdominal or epigastric pain. No nausea, vomiting or hematemesis; No diarrhea or constipation. No melena or hematochezia.  Genitourinary: No dysuria, frequency, hematuria or incontinence  Rectal: No pain, hemorrhoids or incontinence  Neurological: No headaches, memory loss, loss of strength, numbness or tremors  Skin: No itching, burning, rashes or lesions   Lymph Nodes: No enlarged glands  Endocrine: No heat or cold intolerance; No hair loss  Musculoskeletal: No joint pain or swelling; No muscle, back or extremity pain  Psychiatric: No depression, anxiety, mood swings or difficulty sleeping  Heme/Lymph: No easy bruising or bleeding gums  Allergy and Immunologic: No hives or eczema    PHYSICAL EXAM:  General: Appears alert and cooperative.  HEENT: Head; normocephalic, atraumatic.  Eyes: Pupils reactive, cornea wnl.  Neck: Supple, no nodes adenopathy, no NVD or carotid bruit or thyromegaly.  CARDIOVASCULAR: Normal S1 and S2,  SEM3-6 murmur, rub, gallop or lift.   LUNGS: No rales, rhonchi or wheeze. Normal breath sounds bilaterally.  ABDOMEN: Soft, nontender without mass or organomegaly. bowel sounds normoactive.  EXTREMITIES: No clubbing, cyanosis or edema. Distal pulses wnl.   SKIN: warm and dry with normal turgor.  NEURO: Alert/oriented x 3/normal motor exam. No pathologic reflexes.    PSYCH: normal affect.        LABS:                        8.5    10.78 )-----------( 244      ( 14 Oct 2022 06:49 )             28.5     10-14    144  |  109<H>  |  87.1<H>  ----------------------------<  115<H>  4.9   |  19.0<L>  |  2.32<H>    Ca    8.7      14 Oct 2022 06:49  Mg     2.5     10-13    TPro  7.2  /  Alb  3.4  /  TBili  0.9  /  DBili  x   /  AST  15  /  ALT  10  /  AlkPhos  89  10-14    LETICIA MCCLURE  CARDIAC MARKERS ( 13 Oct 2022 15:39 )  x     / 0.05 ng/mL / x     / x     / x          PTT - ( 14 Oct 2022 06:49 )  PTT:107.2 sec      RADIOLOGY & ADDITIONAL STUDIES:    INTERPRETATION OF TELEMETRY (personally reviewed):    ECG:    ECHO: < from: TTE Echo Complete w/ Contrast w/ Doppler (10.13.22 @ 18:18) >  PHYSICIAN INTERPRETATION:  Left Ventricle: Endocardial visualization was enhanced with intravenous   echo contrast. The left ventricular internal cavity size is normal.  Global LV systolic function was low normal. Left ventricular ejection   fraction, by visual estimation, is 50 to 55%. The interventricular septum   is flattened in systole and diastole, consistent with right ventricular   pressure and volume overload. The mitral in-flow pattern reveals no   discernable A-wave, therefore no comment on diastolic function can be  made.  Right Ventricle: The right ventricular size is severely enlarged. RV   systolic function is severely reduced. No evidence of RV thrombus.  Left Atrium: Severely enlarged left atrium.  Right Atrium: Severely enlarged right atrium.  Pericardium: There is no evidence of pericardial effusion.  Mitral Valve: Mild mitral valve regurgitation is seen. Known mitral valve   repair.  Tricuspid Valve: The tricuspid valve is normal in structure. Severe   tricuspid regurgitation is visualized. Estimated pulmonary artery   systolic pressure is 61.8 mmHg assuming a right atrial pressure of 15   mmHg, which is consistent with severe pulmonary hypertension.  Aortic Valve: No evidence of aortic valve regurgitation is seen. The   aortic valve is severely calcified/ sclerotic, with peak velocity of 2.64   m/s, mean gradient of 15 mmHg, NAJMA (via VTI) of 0.92 sq cm and   dimensionless index of 0.22, consistent with severe aortic stenosis. This   may be true aortic valve stenosis vs low flow low gradient AS (pseudo   AS). The Dimesionless Index value is 0.22.  Pulmonic Valve: Mild to moderate pulmonic valve regurgitation.  Aorta: There is dilatation of the aortic root and ascending aorta.  Pulmonary Artery: The pulmonary artery is mildly dilated.  Venous: The inferior vena cava was dilated, with respiratory size   variation less than 50%.  In comparison to the previous echocardiogram(s): There are no prior   studies on this patient for comparison purposes.      Summary:   1. Left ventricular ejection fraction, by visual estimation, is 50 to   55%.   2. Technically difficult study.   3. Low normal global left ventricular systolic function.   4. The mitral in-flow pattern reveals no discernable A-wave, therefore   no comment on diastolic function can be made.   5. There is mild concentric left ventricular hypertrophy.   6. Right ventricular volume and pressure overload.   7. Severely enlarged right ventricle.   8. Severely reduced RV systolic function.   9. Severe biatrial enlargement.  10. Severe tricuspid regurgitation.  11. Estimated pulmonary artery systolic pressure is 61.8 mmHg assuming a   right atrial pressure of 15 mmHg, which is consistent with severe   pulmonary hypertension.  12. Mild mitral valve regurgitation.  13. Mild to moderate pulmonic valve regurgitation.  14. The aortic valve is severely calcified/ sclerotic, with peak velocity   of 2.64 m/s, mean gradient of 15 mmHg, NAJMA (via VTI) of 0.92 sq cm and   dimensionless index of 0.22, consistent with severe aortic stenosis. This   may be true aortic valve stenosis vs low flow low gradient AS (pseudo AS).  15. Dilatation of the aortic root and ascending aorta.  16. Mildly dilated pulmonary artery.  17. Endocardial visualization was enhanced with intravenous echo contrast.  18. There are no prior studies on this patient for comparison purposes.    Shawna Albert MD Electronically signed on 10/14/2022 at 10:02:30 AM            < end of copied text >              ACTIVE PROBLEMS:  HEALTH ISSUES - PROBLEM Dx:  Acute respiratory failure with hypoxia    Pulmonary embolism    Anemia of chronic disease    Discharge planning issues    Suspected pulmonary embolism    HLD (hyperlipidemia)    HTN (hypertension)    TIERRA (acute kidney injury)

## 2022-10-16 NOTE — PROGRESS NOTE ADULT - ASSESSMENT
78 yo M PMH GERD, HLD, BPH, Hypothyroidism, HTN, mitral valve repair in 2005 w/ JEANMARIE ligation, severe pulm HTN and A.fib not on AC who presents from his outpatient cardiologist office with TTE findings concerning for PE. Now admitted for further workup and management.     SOB 2/2 Pulmonary embolism, HFpEF  - Patient presented to outpatient cardiologist office with SLADE. Had limited TTE that was concerning for RV enlargement and RV apical thrombus  - Cardiology following  - V/Q scan negative  - LE venous duplex negative  - TTE reviewed - severely enlarged RV, severe AS  - Continue Bumex IVP 1mg BID  -for dobutamine echo monday as per cardio     AOCD  - S/p 1 unit PRBC  - stable    TIERRA on ckd stage 3  - improved cr    HLD  - Continue statin    HTN  -  Continue home medications    hypothyroid - synthroid    DVT ppx: Heparin sub q
Admitted from office with worsening SOB SLADE and respiratory failure for the last few weeks.   Hx of severe pulmonary HTN followed at [facility]   Hx of MV repair 2005 with JEANMARIE ligation AF Aflutter not on AC followed at [facility] by EP , MARIMAR, HTN HCL       Plan    Tele monitoring   Awaiting VQ scan to r/o PE, cont IV heparin  TTE severe AS vs low flow low gradient AS Will need dobutamine echo   Pulmonary on the case , PHTN management , discussed may need O2 therapy will assess prior to DC  continue IV diuresis   Discussed in detail with patient and his son  Will follow up with you closely     
75yo M PMH GERD, HLD, BPH, Hypothyroidism, HTN, mitral valve repair in 2005 w/ JEANMARIE ligation, severe pulm HTN and A.fib not on AC who presents from his outpatient cardiologist office with TTE findings concerning for PE. Now admitted for further workup and management.     SOB 2/2 ?Pulmonary embolism, HFpEF  - Patient presented to outpatient cardiologist office with SLADE. Had limited TTE that was concerning for RV enlargement and RV apical thrombus  - Cardiology following  - V/Q scan pending  - LE venous duplex negative  - On Heparin gtt  - TTE reviewed - severely enlarged RV, severe AS  - Monitor I&Os, daily weights, fluid/salt restriction  - Continue Bumex IVP 1mg BID  - Follow CT chest    AOCD  - S/p 1 unit PRBC  - Hgb: 8.5  - Monitor CBC, transfuse prn to maintain Hgb > 8    TIERRA  - likely prerenal  - Continue IVFs  - Monitor BMP, correct electrolytes as needed    HLD  - Continue statin    HTN  -  Continue home medications    DVT ppx: Heparin gtt
Admitted from office with worsening SOB SLADE and respiratory failure for the last few weeks.   Hx of severe pulmonary HTN followed at [facility]   Hx of MV repair 2005 with JEANMARIE ligation AF Aflutter not on AC followed at [facility] by EP , MARIMAR, HTN HCL   VQ very low prob      Plan    Tele monitoring   TTE severe AS vs low flow low gradient AS Will need dobutamine echo Monday  Pulmonary on the case , PHTN management , MARIMAR use of CPAP discussed may need O2 therapy will assess prior to DC  continue IV diuresis Lytes replacement monitor closely renal panel  Discussed in detail with patient   Will follow up with you closely     
76 yo M PMH GERD, HLD, BPH, Hypothyroidism, HTN, mitral valve repair in 2005 w/ JEANMARIE ligation, severe pulm HTN and A.fib not on AC who presents from his outpatient cardiologist office with TTE findings concerning for PE. Now admitted for further workup and management.     SOB 2/2 Pulmonary embolism, HFpEF  - Patient presented to outpatient cardiologist office with SLADE. Had limited TTE that was concerning for RV enlargement and RV apical thrombus  - Cardiology following  - V/Q scan negative  - LE venous duplex negative  - TTE reviewed - severely enlarged RV, severe AS  - Continue Bumex IVP 1mg BID  -for dobutamine echo monday as per cardio     AOCD  - S/p 1 unit PRBC  - stable    TIERRA on ckd stage 3  - stable    HLD  - Continue statin    HTN  -  Continue home medications    hypothyroid - synthroid    DVT ppx: Heparin sub q
Admitted from office with worsening SOB SLADE and respiratory failure for the last few weeks.   Hx of severe pulmonary HTN followed at [facility]   Hx of MV repair 2005 with JEANMARIE ligation AF Aflutter not on AC followed at [facility] by EP , MARIMAR, HTN HCL   VQ very low prob  Episodes of desaturation overnight      Plan    Tele monitoring   TTE severe AS vs low flow low gradient AS Will do dobutamine echo Monday to assess AV  Pulmonary on the case , PHTN management , MARIMAR use of CPAP discussed may need O2 therapy will assess prior to DC  continue IV diuresis Lytes replacement monitor closely renal panel  Discussed in detail with patient   Will follow up with you closely     
Patient's complaints of shortness of breath and evidence of biventricular failure most likely on the basis of group 2 pulmonary hypertension possibly complicated by untreated sleep apnea.  Contribution of his aortic stenosis is unknown.  Doubt pulmonary emboli or pneumonia.    Plan:  1.  As per cardiology  2.  Noncontrast CAT scan and VQ scan  3.  Wean O2 as tolerated  4.  Further follow-up with [facility]  5.  Patient does need to reconsider treatment of his obstructive sleep apnea  
Patient's complaints of dyspnea most likely on the basis of biventricular failure on the basis of group 2 pulmonary hypertension complicated by untreated sleep apnea.  Findings on CAT scan consistent with pulmonary edema.  This is further complicated by aortic stenosis.  No evidence of pulmonary emboli    Plan:  1.  As per cardiology  2.  Wean O2 as tolerated  3.  Further work-up per [place] [facility]  4.  Patient does need to consider reevaluation for sleep apnea at our office  5.  We will sign off please recall as needed

## 2022-10-17 ENCOUNTER — TRANSCRIPTION ENCOUNTER (OUTPATIENT)
Age: 77
End: 2022-10-17

## 2022-10-17 VITALS
TEMPERATURE: 98 F | DIASTOLIC BLOOD PRESSURE: 56 MMHG | HEART RATE: 80 BPM | OXYGEN SATURATION: 98 % | SYSTOLIC BLOOD PRESSURE: 120 MMHG | RESPIRATION RATE: 16 BRPM

## 2022-10-17 LAB
ALBUMIN SERPL ELPH-MCNC: 3.3 G/DL — SIGNIFICANT CHANGE UP (ref 3.3–5.2)
ALP SERPL-CCNC: 96 U/L — SIGNIFICANT CHANGE UP (ref 40–120)
ALT FLD-CCNC: 10 U/L — SIGNIFICANT CHANGE UP
ANION GAP SERPL CALC-SCNC: 15 MMOL/L — SIGNIFICANT CHANGE UP (ref 5–17)
AST SERPL-CCNC: 16 U/L — SIGNIFICANT CHANGE UP
BILIRUB SERPL-MCNC: 0.9 MG/DL — SIGNIFICANT CHANGE UP (ref 0.4–2)
BUN SERPL-MCNC: 81.3 MG/DL — HIGH (ref 8–20)
CALCIUM SERPL-MCNC: 8.3 MG/DL — LOW (ref 8.4–10.5)
CHLORIDE SERPL-SCNC: 107 MMOL/L — SIGNIFICANT CHANGE UP (ref 98–107)
CO2 SERPL-SCNC: 20 MMOL/L — LOW (ref 22–29)
CREAT SERPL-MCNC: 2.07 MG/DL — HIGH (ref 0.5–1.3)
EGFR: 33 ML/MIN/1.73M2 — LOW
GLUCOSE SERPL-MCNC: 97 MG/DL — SIGNIFICANT CHANGE UP (ref 70–99)
HCT VFR BLD CALC: 28.1 % — LOW (ref 39–50)
HGB BLD-MCNC: 8.3 G/DL — LOW (ref 13–17)
MAGNESIUM SERPL-MCNC: 2.5 MG/DL — SIGNIFICANT CHANGE UP (ref 1.6–2.6)
MCHC RBC-ENTMCNC: 26.9 PG — LOW (ref 27–34)
MCHC RBC-ENTMCNC: 29.5 GM/DL — LOW (ref 32–36)
MCV RBC AUTO: 91.2 FL — SIGNIFICANT CHANGE UP (ref 80–100)
PLATELET # BLD AUTO: 212 K/UL — SIGNIFICANT CHANGE UP (ref 150–400)
POTASSIUM SERPL-MCNC: 3.9 MMOL/L — SIGNIFICANT CHANGE UP (ref 3.5–5.3)
POTASSIUM SERPL-SCNC: 3.9 MMOL/L — SIGNIFICANT CHANGE UP (ref 3.5–5.3)
PROT SERPL-MCNC: 6.9 G/DL — SIGNIFICANT CHANGE UP (ref 6.6–8.7)
RBC # BLD: 3.08 M/UL — LOW (ref 4.2–5.8)
RBC # FLD: 17.8 % — HIGH (ref 10.3–14.5)
SODIUM SERPL-SCNC: 142 MMOL/L — SIGNIFICANT CHANGE UP (ref 135–145)
WBC # BLD: 7.28 K/UL — SIGNIFICANT CHANGE UP (ref 3.8–10.5)
WBC # FLD AUTO: 7.28 K/UL — SIGNIFICANT CHANGE UP (ref 3.8–10.5)

## 2022-10-17 PROCEDURE — 99239 HOSP IP/OBS DSCHRG MGMT >30: CPT

## 2022-10-17 PROCEDURE — 99222 1ST HOSP IP/OBS MODERATE 55: CPT

## 2022-10-17 RX ORDER — BUMETANIDE 0.25 MG/ML
1 INJECTION INTRAMUSCULAR; INTRAVENOUS
Qty: 60 | Refills: 0
Start: 2022-10-17 | End: 2022-11-15

## 2022-10-17 RX ORDER — BUMETANIDE 0.25 MG/ML
3 INJECTION INTRAMUSCULAR; INTRAVENOUS
Qty: 0 | Refills: 0 | DISCHARGE
Start: 2022-10-17 | End: 2022-11-15

## 2022-10-17 RX ORDER — LEVOTHYROXINE SODIUM 125 MCG
1 TABLET ORAL
Qty: 0 | Refills: 0 | DISCHARGE

## 2022-10-17 RX ORDER — METOPROLOL TARTRATE 50 MG
1 TABLET ORAL
Qty: 0 | Refills: 0 | DISCHARGE

## 2022-10-17 RX ORDER — BUMETANIDE 0.25 MG/ML
1 INJECTION INTRAMUSCULAR; INTRAVENOUS
Qty: 30 | Refills: 0
Start: 2022-10-17 | End: 2022-11-15

## 2022-10-17 RX ORDER — LEVOTHYROXINE SODIUM 125 MCG
1 TABLET ORAL
Qty: 0 | Refills: 0 | DISCHARGE
Start: 2022-10-17

## 2022-10-17 RX ORDER — METOPROLOL TARTRATE 50 MG
1 TABLET ORAL
Qty: 0 | Refills: 0 | DISCHARGE
Start: 2022-10-17

## 2022-10-17 RX ORDER — BUMETANIDE 0.25 MG/ML
1 INJECTION INTRAMUSCULAR; INTRAVENOUS
Qty: 0 | Refills: 0 | DISCHARGE

## 2022-10-17 RX ORDER — HYDRALAZINE HCL 50 MG
1 TABLET ORAL
Qty: 0 | Refills: 0 | DISCHARGE

## 2022-10-17 RX ORDER — HYDRALAZINE HCL 50 MG
1 TABLET ORAL
Qty: 0 | Refills: 0 | DISCHARGE
Start: 2022-10-17

## 2022-10-17 RX ADMIN — BUMETANIDE 1 MILLIGRAM(S): 0.25 INJECTION INTRAMUSCULAR; INTRAVENOUS at 05:36

## 2022-10-17 RX ADMIN — Medication 50 MILLIGRAM(S): at 05:36

## 2022-10-17 RX ADMIN — Medication 50 MICROGRAM(S): at 05:36

## 2022-10-17 NOTE — DISCHARGE NOTE PROVIDER - NSDCCPCAREPLAN_GEN_ALL_CORE_FT
PRINCIPAL DISCHARGE DIAGNOSIS  Diagnosis: Suspected pulmonary hypertension  Assessment and Plan of Treatment:       SECONDARY DISCHARGE DIAGNOSES  Diagnosis: Pulmonary embolism  Assessment and Plan of Treatment:     Diagnosis: Pulmonary hypertension due to left heart valvular disease  Assessment and Plan of Treatment:     Diagnosis: HTN (hypertension)  Assessment and Plan of Treatment:     Diagnosis: HLD (hyperlipidemia)  Assessment and Plan of Treatment:     Diagnosis: Acute respiratory failure with hypoxia  Assessment and Plan of Treatment:     Diagnosis: Acute on chronic renal failure  Assessment and Plan of Treatment:

## 2022-10-17 NOTE — DISCHARGE NOTE PROVIDER - HOSPITAL COURSE
76 yo M PMH GERD, HLD, BPH, Hypothyroidism, HTN, mitral valve repair in 2005 w/ JEANMARIE ligation, severe pulm HTN and A.fib not on AC who presents from his outpatient cardiologist office with TTE findings concerning for PE. Now admitted for further workup and management.      SOB 2/2 Pulmonary embolism, HFpEF  - Patient presented to outpatient cardiologist office with SLADE. Had limited TTE that was concerning for RV enlargement and RV apical thrombus  - Cardiology following  - V/Q scan negative  - LE venous duplex negative  - TTE reviewed - severely enlarged RV, severe AS  - Continue Bumex   -for dobutamine echo 10/17    AOCD  - S/p 1 unit PRBC  - stable    TIERRA on ckd stage 3  - stable    HLD  - Continue statin    HTN  -  Continue home medications    hypothyroid - synthroid     76 yo M PMH GERD, HLD, BPH, Hypothyroidism, HTN, mitral valve repair in 2005 w/ JEANMARIE ligation, severe pulm HTN and A.fib not on AC who presents from his outpatient cardiologist office with TTE findings concerning for PE. Now admitted for further workup and management.      SOB 2/2 Pulmonary embolism, HFpEF  - Patient presented to outpatient cardiologist office with SLADE. Had limited TTE that was concerning for RV enlargement and RV apical thrombus  - Cardiology following  - V/Q scan negative  - LE venous duplex negative  - TTE reviewed - severely enlarged RV, severe AS  - Continue Bumex bid  -for dobutamine echo 10/17    AOCD  - S/p 1 unit PRBC  - stable    TIERRA on ckd stage 3  - stable    HLD  - Continue statin    HTN  -  Continue home medications    hypothyroid - synthroid

## 2022-10-17 NOTE — DISCHARGE NOTE PROVIDER - CARE PROVIDER_API CALL
Xander Chase)  Cardiovascular Disease; Internal Medicine  [address] [place] [address]  Phone: [phone]  Fax: [phone]  Follow Up Time:     primary care,   pcp  Phone: (   )    -  Fax: (   )    -  Follow Up Time:

## 2022-10-17 NOTE — PROGRESS NOTE ADULT - SUBJECTIVE AND OBJECTIVE BOX
[facility]                                   [address] [place] [address]                                                      PHONE: [phone]                                                         FAX: [phone]  [email or web address]  ---------------------------------------------------------------------------------------------------------------------------------    Overnight events/patient complaints:  Patient feeling well today.  No chest pain or dyspnea.  Dobutamine stress echo planned for today.     PAST MEDICAL & SURGICAL HISTORY:  HTN (hypertension)      HLD (hyperlipidemia)      No significant past surgical history      No Known Allergies    MEDICATIONS  (STANDING):  atorvastatin 20 milliGRAM(s) Oral at bedtime  buMETAnide Injectable 1 milliGRAM(s) IV Push two times a day  enoxaparin Injectable 40 milliGRAM(s) SubCutaneous every 24 hours  hydrALAZINE 50 milliGRAM(s) Oral two times a day  levothyroxine 50 MICROGram(s) Oral daily  metoprolol succinate ER 50 milliGRAM(s) Oral daily  polyethylene glycol 3350 17 Gram(s) Oral daily  senna 2 Tablet(s) Oral at bedtime    MEDICATIONS  (PRN):  acetaminophen     Tablet .. 650 milliGRAM(s) Oral every 6 hours PRN Temp greater or equal to 38C (100.4F), Mild Pain (1 - 3)  ALBUTerol    0.083% 2.5 milliGRAM(s) Nebulizer every 6 hours PRN Shortness of Breath and/or Wheezing  aluminum hydroxide/magnesium hydroxide/simethicone Suspension 30 milliLiter(s) Oral every 4 hours PRN Dyspepsia  lactulose Syrup 10 Gram(s) Oral three times a day PRN constipation  melatonin 3 milliGRAM(s) Oral at bedtime PRN Insomnia  ondansetron Injectable 4 milliGRAM(s) IV Push every 8 hours PRN Nausea and/or Vomiting      Vital Signs Last 24 Hrs  T(C): 36.9 (17 Oct 2022 08:38), Max: 36.9 (17 Oct 2022 08:38)  T(F): 98.4 (17 Oct 2022 08:38), Max: 98.4 (17 Oct 2022 08:38)  HR: 80 (17 Oct 2022 08:38) (65 - 92)  BP: 120/56 (17 Oct 2022 08:38) (106/50 - 120/56)  BP(mean): --  RR: 16 (17 Oct 2022 08:38) (16 - 18)  SpO2: 98% (17 Oct 2022 08:38) (95% - 98%)    Parameters below as of 17 Oct 2022 08:38  Patient On (Oxygen Delivery Method): nasal cannula  O2 Flow (L/min): 1    ICU Vital Signs Last 24 Hrs  LTEICIA MCCLURE  I&O's Detail    16 Oct 2022 07:01  -  17 Oct 2022 07:00  --------------------------------------------------------  IN:    Oral Fluid: 1080 mL  Total IN: 1080 mL    OUT:    Intermittent Catheterization - Urethral (mL): 650 mL  Total OUT: 650 mL    Total NET: 430 mL        I&O's Summary    16 Oct 2022 07:01  -  17 Oct 2022 07:00  --------------------------------------------------------  IN: 1080 mL / OUT: 650 mL / NET: 430 mL      Drug Dosing Weight  LETICIA MCCLURE      PHYSICAL EXAM:  General: Appears well developed, alert and cooperative.  HEENT: Head; normocephalic, atraumatic.  Eyes: Pupils reactive, cornea wnl.  Neck: Supple, no nodes adenopathy, no JVD, no carotid bruit  CARDIOVASCULAR: Normal S1 and S2, 3/6 CHRISSY murmur radiating to R carotid, no rub, gallop or lift.   LUNGS: No rales, rhonchi or wheeze. Normal breath sounds bilaterally.  ABDOMEN: Soft, nontender, nondistended  EXTREMITIES: No clubbing; minimal edema. Distal pulses wnl.   SKIN: warm and dry with normal turgor.  NEURO: Alert/oriented x 3/normal motor exam.   PSYCH: normal affect.        LABS:                        8.3    7.28  )-----------( 212      ( 17 Oct 2022 05:50 )             28.1     10-17    142  |  107  |  81.3<H>  ----------------------------<  97  3.9   |  20.0<L>  |  2.07<H>    Ca    8.3<L>      17 Oct 2022 05:50  Phos  4.5     10-16  Mg     2.5     10-17    TPro  6.9  /  Alb  3.3  /  TBili  0.9  /  DBili  x   /  AST  16  /  ALT  10  /  AlkPhos  96  10-17    LETICIA MCCLURE            RADIOLOGY & ADDITIONAL STUDIES:    INTERPRETATION OF TELEMETRY (personally reviewed):    ECG:    ECG:    ECHO: < from: TTE Echo Complete w/ Contrast w/ Doppler (10.13.22 @ 18:18) >  PHYSICIAN INTERPRETATION:  Left Ventricle: Endocardial visualization was enhanced with intravenous   echo contrast. The left ventricular internal cavity size is normal.  Global LV systolic function was low normal. Left ventricular ejection   fraction, by visual estimation, is 50 to 55%. The interventricular septum   is flattened in systole and diastole, consistent with right ventricular   pressure and volume overload. The mitral in-flow pattern reveals no   discernable A-wave, therefore no comment on diastolic function can be  made.  Right Ventricle: The right ventricular size is severely enlarged. RV   systolic function is severely reduced. No evidence of RV thrombus.  Left Atrium: Severely enlarged left atrium.  Right Atrium: Severely enlarged right atrium.  Pericardium: There is no evidence of pericardial effusion.  Mitral Valve: Mild mitral valve regurgitation is seen. Known mitral valve   repair.  Tricuspid Valve: The tricuspid valve is normal in structure. Severe   tricuspid regurgitation is visualized. Estimated pulmonary artery   systolic pressure is 61.8 mmHg assuming a right atrial pressure of 15   mmHg, which is consistent with severe pulmonary hypertension.  Aortic Valve: No evidence of aortic valve regurgitation is seen. The   aortic valve is severely calcified/ sclerotic, with peak velocity of 2.64   m/s, mean gradient of 15 mmHg, NAJMA (via VTI) of 0.92 sq cm and   dimensionless index of 0.22, consistent with severe aortic stenosis. This   may be true aortic valve stenosis vs low flow low gradient AS (pseudo   AS). The Dimesionless Index value is 0.22.  Pulmonic Valve: Mild to moderate pulmonic valve regurgitation.  Aorta: There is dilatation of the aortic root and ascending aorta.  Pulmonary Artery: The pulmonary artery is mildly dilated.  Venous: The inferior vena cava was dilated, with respiratory size   variation less than 50%.  In comparison to the previous echocardiogram(s): There are no prior   studies on this patient for comparison purposes.      Summary:   1. Left ventricular ejection fraction, by visual estimation, is 50 to   55%.   2. Technically difficult study.   3. Low normal global left ventricular systolic function.   4. The mitral in-flow pattern reveals no discernable A-wave, therefore   no comment on diastolic function can be made.   5. There is mild concentric left ventricular hypertrophy.   6. Right ventricular volume and pressure overload.   7. Severely enlarged right ventricle.   8. Severely reduced RV systolic function.   9. Severe biatrial enlargement.  10. Severe tricuspid regurgitation.  11. Estimated pulmonary artery systolic pressure is 61.8 mmHg assuming a   right atrial pressure of 15 mmHg, which is consistent with severe   pulmonary hypertension.  12. Mild mitral valve regurgitation.  13. Mild to moderate pulmonic valve regurgitation.  14. The aortic valve is severely calcified/ sclerotic, with peak velocity   of 2.64 m/s, mean gradient of 15 mmHg, NAJMA (via VTI) of 0.92 sq cm and   dimensionless index of 0.22, consistent with severe aortic stenosis. This   may be true aortic valve stenosis vs low flow low gradient AS (pseudo AS).  15. Dilatation of the aortic root and ascending aorta.  16. Mildly dilated pulmonary artery.  17. Endocardial visualization was enhanced with intravenous echo contrast.  18. There are no prior studies on this patient for comparison purposes.    Shawna Albert MD Electronically signed on 10/14/2022 at 10:02:30 AM      STRESS TEST:    CARDIAC CATHETERIZATION:    ASSESSMENT AND PLAN:  In summary, LETICIA MCCLURE is an 77y Male with past medical history significant for afib/flutter, MARIMAR, HTN, HLD, MV repair with JEANMARIE ligation presents with SLADE found to have low flow, low gradient severe AS on echo.    Aortic stenosis- dobutamine stress echo pending for today.  Patient is NPO.  He is requesting further invasive workup to be done at [place].     TIERRA- improving    Valvular heart failure/HFpEF- continue IV bumex.  Can likely switch to PO on 10/18/22    Thank you for allowing [facility] to participate in the care of this patient.  Please feel free to call with any questions or concerns.

## 2022-10-17 NOTE — CHART NOTE - NSCHARTNOTEFT_GEN_A_CORE
RN called at 12:42 am for 14 beats of Vtach on monitor @ 9:41 pm. Pt is asymptomatic. I went to evaluate patient and he refused EKG and labs he states "I have two cardiologist outside of this hospital, and have gone through so many test, I do not want any more". Pt is A&Ox3. I explained the risk of refusing care, patient understands the risks which include but not limited to worsening health, complications and potentially death. Pt understands at this time and still wishes to refuse EKG and labs. VS stable at time of exam, pt denies sx such as but not limited to CP, palpitations, SOB, dyspnea, dizziness, weakness, and  change in vision.     T(C): 36.6 (10-16-22 @ 20:00), Max: 36.7 (10-16-22 @ 13:30)  HR: 70 (10-16-22 @ 22:00) (65 - 87)  BP: 107/61 (10-16-22 @ 22:00) (107/61 - 134/74)  RR: 18 (10-16-22 @ 22:00) (17 - 18)  SpO2: 95% (10-16-22 @ 22:00) (95% - 97%)    CONSTITUTIONAL: Well groomed, no apparent distress  EYES: PERRLA and symmetric, EOMI, No conjunctival or scleral injection, non-icteric  RESP: No respiratory distress, no use of accessory muscles; CTA b/l, no WRR  CV: AS noted on exam, S1, S2  SKIN: No rashes or ulcers noted; no subcutaneous nodules or induration palpable  NEURO: CN II-XII intact; normal reflexes in upper and lower extremities, sensation intact in upper and lower extremities b/l to light touch   PSYCH: Appropriate insight/judgment; A+O x 3, mood and affect appropriate, recent/remote memory intact    Will continue to monitor VS, and sx. Nurse was informed to call the PA or provider if sx initiate, or if additional changes noted on monitor

## 2022-10-17 NOTE — DISCHARGE NOTE PROVIDER - NSDCMRMEDTOKEN_GEN_ALL_CORE_FT
allopurinol 100 mg oral tablet: 1 tab(s) orally 2 times a day  Bumex 1 mg oral tablet: 1 tab(s) orally once a day  famotidine 20 mg oral tablet: 1 tab(s) orally once a day  hydrALAZINE 50 mg oral tablet: 1 tab(s) orally 2 times a day  Kerendia 10 mg oral tablet: 1 tab(s) orally every 48 hours  levothyroxine 50 mcg (0.05 mg) oral tablet: 1 tab(s) orally once a day  Lipitor 20 mg oral tablet: 1 tab(s) orally once a day  metoprolol succinate 50 mg oral tablet, extended release: 1 tab(s) orally once a day  Potassium Chloride (Eqv-K-Tab) 20 mEq oral tablet, extended release: 2 tab(s) orally once a day   allopurinol 100 mg oral tablet: 1 tab(s) orally 2 times a day  bumetanide 1 mg oral tablet: 1 tab(s) orally 2 times a day   famotidine 20 mg oral tablet: 1 tab(s) orally once a day  hydrALAZINE 50 mg oral tablet: 1 tab(s) orally 2 times a day  Kerendia 10 mg oral tablet: 1 tab(s) orally every 48 hours  levothyroxine 50 mcg (0.05 mg) oral tablet: 1 tab(s) orally once a day  Lipitor 20 mg oral tablet: 1 tab(s) orally once a day  metoprolol succinate 50 mg oral tablet, extended release: 1 tab(s) orally once a day  Potassium Chloride (Eqv-K-Tab) 20 mEq oral tablet, extended release: 2 tab(s) orally once a day

## 2022-10-17 NOTE — DISCHARGE NOTE NURSING/CASE MANAGEMENT/SOCIAL WORK - NSDCPEFALRISK_GEN_ALL_CORE
For information on Fall & Injury Prevention, visit: [email or web address] OR  [email or web address] OR  https://www.cdc.gov/steadi/patient.html

## 2022-10-17 NOTE — DISCHARGE NOTE PROVIDER - PROVIDER TOKENS
PROVIDER:[TOKEN:[[id number]]],FREE:[LAST:[primary care],PHONE:[(   )    -],FAX:[(   )    -],ADDRESS:[pcp]]

## 2022-10-17 NOTE — DISCHARGE NOTE PROVIDER - ATTENDING DISCHARGE PHYSICAL EXAMINATION:
CONSTITUTIONAL: NAD  HEENT: NC/AT, PERRL, no JVD  RESPIRATORY: CTA bilaterally, normal effort  CARDIOVASCULAR: RRR, S1/S2+, no m/g/r  ABDOMEN: Nontender to palpation, normoactive bowel sounds, no rebound/guarding  MUSCULOSKELETAL: No edema, cyanosis or deformities.  PSYCH: Calm, affect appropriate.  NEUROLOGY: Awake, alert, no focal neurological deficits.   SKIN: No rashes; no palpable lesions  VASC: Distal pulses palpable

## 2022-10-17 NOTE — PROGRESS NOTE ADULT - PROVIDER SPECIALTY LIST ADULT
Cardiology
Hospitalist
Internal Medicine
Cardiology
Internal Medicine
Pulmonology
Pulmonology

## 2022-10-17 NOTE — DISCHARGE NOTE NURSING/CASE MANAGEMENT/SOCIAL WORK - PATIENT PORTAL LINK FT
You can access the FollowMyHealth Patient Portal offered by [facility] by registering at the following website: [email or web address]. By joining [facility]’s FollowMyHealth portal, you will also be able to view your health information using other applications (apps) compatible with our system.

## 2022-10-19 NOTE — CDI QUERY NOTE - NSCDIOTHERTXTBX2_GEN_ALL_CORE_FT
H&P-·  Problem: Pulmonary embolism.   ·  Plan: - Patient presented to outpatient cardiologist office with SLADE. Had limited TTE that was concerning for RV enlargement and RV apical thrombus    < from: NM Pulmonary Ventilation/Perfusion Scan (10.14.22 @ 13:00) >    IMPRESSION: Very low probability of pulmonary embolus.    < end of copied text >    10/14- Pulm- Patient's complaints of shortness of breath and evidence of biventricular failure most likely on the basis of group 2 pulmonary hypertension possibly complicated by untreated sleep apnea.  Contribution of his aortic stenosis is unknown.  Doubt pulmonary emboli or pneumonia.  Problem/Plan - 2:  ·  Problem: Suspected pulmonary embolism.     10/14 Hosp- SOB 2/2 ?Pulmonary embolism, HFpEF    10/16 Cardio- VQ very low prob PE    D/C note- SECONDARY DISCHARGE DIAGNOSES  Diagnosis: Pulmonary embolism    Please clarify the status of PE since there is conflicting documentation on chart.    - Probable PE  - After study, PE ruled out  - Other ( please specify)  - Not clinically significant

## 2022-10-19 NOTE — CDI QUERY NOTE - NSCDIOTHERTXTBX_GEN_ALL_CORE_HH
Serum Pro-Brain Natriuretic Peptide: 8302 pg/mL (10.13.22 @ 15:39)     < from: Xray Chest 1 View AP/PA. (10.13.22 @ 15:09) >    There is mild central CHF possibly increased from prior.    < end of copied text >    10/14 Hosp- SOB 2/2 ?Pulmonary embolism, HFpEF  - Patient presented to outpatient cardiologist office with SLADE. Had limited TTE that was concerning for RV enlargement and RV apical thrombus    10/17 Cardio- Valvular heart failure/HFpEF- continue IV bumex.  Can likely switch to PO on 10/18/22    D/C note- SOB 2/2 Pulmonary embolism, HFpEF    Please specify the acuity of CHF    - Acute HFpEF  - Acute on chronic HFpEF  - Other  - Not clinically significant

## 2022-11-09 PROCEDURE — G1004: CPT

## 2022-11-09 PROCEDURE — 36430 TRANSFUSION BLD/BLD COMPNT: CPT

## 2022-11-09 PROCEDURE — 80053 COMPREHEN METABOLIC PANEL: CPT

## 2022-11-09 PROCEDURE — 86901 BLOOD TYPING SEROLOGIC RH(D): CPT

## 2022-11-09 PROCEDURE — 78582 LUNG VENTILAT&PERFUS IMAGING: CPT | Mod: MG

## 2022-11-09 PROCEDURE — 84100 ASSAY OF PHOSPHORUS: CPT

## 2022-11-09 PROCEDURE — 83880 ASSAY OF NATRIURETIC PEPTIDE: CPT

## 2022-11-09 PROCEDURE — 0225U NFCT DS DNA&RNA 21 SARSCOV2: CPT

## 2022-11-09 PROCEDURE — 71250 CT THORAX DX C-: CPT | Mod: MG

## 2022-11-09 PROCEDURE — 99285 EMERGENCY DEPT VISIT HI MDM: CPT | Mod: 25

## 2022-11-09 PROCEDURE — A9567: CPT

## 2022-11-09 PROCEDURE — 85379 FIBRIN DEGRADATION QUANT: CPT

## 2022-11-09 PROCEDURE — C8929: CPT

## 2022-11-09 PROCEDURE — 83690 ASSAY OF LIPASE: CPT

## 2022-11-09 PROCEDURE — 97163 PT EVAL HIGH COMPLEX 45 MIN: CPT

## 2022-11-09 PROCEDURE — 36415 COLL VENOUS BLD VENIPUNCTURE: CPT

## 2022-11-09 PROCEDURE — 85025 COMPLETE CBC W/AUTO DIFF WBC: CPT

## 2022-11-09 PROCEDURE — 71045 X-RAY EXAM CHEST 1 VIEW: CPT

## 2022-11-09 PROCEDURE — P9016: CPT

## 2022-11-09 PROCEDURE — 93005 ELECTROCARDIOGRAM TRACING: CPT

## 2022-11-09 PROCEDURE — 86900 BLOOD TYPING SEROLOGIC ABO: CPT

## 2022-11-09 PROCEDURE — 84484 ASSAY OF TROPONIN QUANT: CPT

## 2022-11-09 PROCEDURE — 86803 HEPATITIS C AB TEST: CPT

## 2022-11-09 PROCEDURE — 93970 EXTREMITY STUDY: CPT

## 2022-11-09 PROCEDURE — 83735 ASSAY OF MAGNESIUM: CPT

## 2022-11-09 PROCEDURE — 93351 STRESS TTE COMPLETE: CPT

## 2022-11-09 PROCEDURE — 85730 THROMBOPLASTIN TIME PARTIAL: CPT

## 2022-11-09 PROCEDURE — 86703 HIV-1/HIV-2 1 RESULT ANTBDY: CPT

## 2022-11-09 PROCEDURE — A9540: CPT

## 2022-11-09 PROCEDURE — 86923 COMPATIBILITY TEST ELECTRIC: CPT

## 2022-11-09 PROCEDURE — 86850 RBC ANTIBODY SCREEN: CPT

## 2022-11-09 PROCEDURE — 85027 COMPLETE CBC AUTOMATED: CPT

## 2022-12-27 ENCOUNTER — NON-APPOINTMENT (OUTPATIENT)
Age: 77
End: 2022-12-27

## 2023-01-12 PROBLEM — E78.5 HYPERLIPIDEMIA, UNSPECIFIED: Chronic | Status: ACTIVE | Noted: 2022-10-13

## 2023-01-12 PROBLEM — I10 ESSENTIAL (PRIMARY) HYPERTENSION: Chronic | Status: ACTIVE | Noted: 2022-10-13

## 2023-02-03 ENCOUNTER — INPATIENT (INPATIENT)
Facility: HOSPITAL | Age: 78
LOS: 1 days | Discharge: HOME | DRG: 699 | End: 2023-02-05
Attending: HOSPITALIST | Admitting: HOSPITALIST
Payer: MEDICARE

## 2023-02-03 VITALS
RESPIRATION RATE: 16 BRPM | OXYGEN SATURATION: 92 % | TEMPERATURE: 98 F | HEART RATE: 103 BPM | DIASTOLIC BLOOD PRESSURE: 71 MMHG | SYSTOLIC BLOOD PRESSURE: 114 MMHG | WEIGHT: 207.01 LBS

## 2023-02-03 DIAGNOSIS — Z95.2 PRESENCE OF PROSTHETIC HEART VALVE: Chronic | ICD-10-CM

## 2023-02-03 DIAGNOSIS — N39.0 URINARY TRACT INFECTION, SITE NOT SPECIFIED: ICD-10-CM

## 2023-02-03 DIAGNOSIS — I50.9 HEART FAILURE, UNSPECIFIED: ICD-10-CM

## 2023-02-03 DIAGNOSIS — Z98.890 OTHER SPECIFIED POSTPROCEDURAL STATES: Chronic | ICD-10-CM

## 2023-02-03 DIAGNOSIS — Z90.49 ACQUIRED ABSENCE OF OTHER SPECIFIED PARTS OF DIGESTIVE TRACT: Chronic | ICD-10-CM

## 2023-02-03 LAB
ALBUMIN SERPL ELPH-MCNC: 2.6 G/DL — LOW (ref 3.3–5.2)
ALP SERPL-CCNC: 114 U/L — SIGNIFICANT CHANGE UP (ref 40–120)
ALT FLD-CCNC: 26 U/L — SIGNIFICANT CHANGE UP
ANION GAP SERPL CALC-SCNC: 12 MMOL/L — SIGNIFICANT CHANGE UP (ref 5–17)
ANISOCYTOSIS BLD QL: SIGNIFICANT CHANGE UP
APPEARANCE UR: ABNORMAL
APTT BLD: 31.1 SEC — SIGNIFICANT CHANGE UP (ref 27.5–35.5)
AST SERPL-CCNC: 36 U/L — SIGNIFICANT CHANGE UP
BACTERIA # UR AUTO: ABNORMAL
BASE EXCESS BLDV CALC-SCNC: 7 MMOL/L — HIGH (ref -2–3)
BASOPHILS # BLD AUTO: 0 K/UL — SIGNIFICANT CHANGE UP (ref 0–0.2)
BASOPHILS NFR BLD AUTO: 0 % — SIGNIFICANT CHANGE UP (ref 0–2)
BILIRUB SERPL-MCNC: 0.7 MG/DL — SIGNIFICANT CHANGE UP (ref 0.4–2)
BILIRUB UR-MCNC: NEGATIVE — SIGNIFICANT CHANGE UP
BUN SERPL-MCNC: 129.5 MG/DL — HIGH (ref 8–20)
CA-I SERPL-SCNC: 1.03 MMOL/L — LOW (ref 1.15–1.33)
CALCIUM SERPL-MCNC: 8.1 MG/DL — LOW (ref 8.4–10.5)
CHLORIDE BLDV-SCNC: 100 MMOL/L — SIGNIFICANT CHANGE UP (ref 96–108)
CHLORIDE SERPL-SCNC: 101 MMOL/L — SIGNIFICANT CHANGE UP (ref 96–108)
CO2 SERPL-SCNC: 30 MMOL/L — HIGH (ref 22–29)
COLOR SPEC: YELLOW — SIGNIFICANT CHANGE UP
CREAT SERPL-MCNC: 2.57 MG/DL — HIGH (ref 0.5–1.3)
DACRYOCYTES BLD QL SMEAR: SLIGHT — SIGNIFICANT CHANGE UP
DIFF PNL FLD: ABNORMAL
EGFR: 25 ML/MIN/1.73M2 — LOW
ELLIPTOCYTES BLD QL SMEAR: SLIGHT — SIGNIFICANT CHANGE UP
EOSINOPHIL # BLD AUTO: 0.12 K/UL — SIGNIFICANT CHANGE UP (ref 0–0.5)
EOSINOPHIL NFR BLD AUTO: 1.7 % — SIGNIFICANT CHANGE UP (ref 0–6)
EPI CELLS # UR: NEGATIVE — SIGNIFICANT CHANGE UP
GAS PNL BLDV: 137 MMOL/L — SIGNIFICANT CHANGE UP (ref 136–145)
GAS PNL BLDV: SIGNIFICANT CHANGE UP
GIANT PLATELETS BLD QL SMEAR: PRESENT — SIGNIFICANT CHANGE UP
GLUCOSE BLDV-MCNC: 108 MG/DL — HIGH (ref 70–99)
GLUCOSE SERPL-MCNC: 119 MG/DL — HIGH (ref 70–99)
GLUCOSE UR QL: NEGATIVE — SIGNIFICANT CHANGE UP
HCO3 BLDV-SCNC: 30 MMOL/L — HIGH (ref 22–29)
HCT VFR BLD CALC: 32 % — LOW (ref 39–50)
HCT VFR BLDA CALC: 31 % — SIGNIFICANT CHANGE UP
HGB BLD CALC-MCNC: 10.3 G/DL — LOW (ref 12.6–17.4)
HGB BLD-MCNC: 10 G/DL — LOW (ref 13–17)
INR BLD: 1.2 RATIO — HIGH (ref 0.88–1.16)
KETONES UR-MCNC: NEGATIVE — SIGNIFICANT CHANGE UP
LACTATE BLDV-MCNC: 1.5 MMOL/L — SIGNIFICANT CHANGE UP (ref 0.5–2)
LEUKOCYTE ESTERASE UR-ACNC: ABNORMAL
LYMPHOCYTES # BLD AUTO: 0.25 K/UL — LOW (ref 1–3.3)
LYMPHOCYTES # BLD AUTO: 3.5 % — LOW (ref 13–44)
MACROCYTES BLD QL: SLIGHT — SIGNIFICANT CHANGE UP
MANUAL SMEAR VERIFICATION: SIGNIFICANT CHANGE UP
MCHC RBC-ENTMCNC: 27.1 PG — SIGNIFICANT CHANGE UP (ref 27–34)
MCHC RBC-ENTMCNC: 31.3 GM/DL — LOW (ref 32–36)
MCV RBC AUTO: 86.7 FL — SIGNIFICANT CHANGE UP (ref 80–100)
MICROCYTES BLD QL: SIGNIFICANT CHANGE UP
MONOCYTES # BLD AUTO: 0.73 K/UL — SIGNIFICANT CHANGE UP (ref 0–0.9)
MONOCYTES NFR BLD AUTO: 10.4 % — SIGNIFICANT CHANGE UP (ref 2–14)
NEUTROPHILS # BLD AUTO: 5.75 K/UL — SIGNIFICANT CHANGE UP (ref 1.8–7.4)
NEUTROPHILS NFR BLD AUTO: 81.8 % — HIGH (ref 43–77)
NITRITE UR-MCNC: POSITIVE
NT-PROBNP SERPL-SCNC: 7209 PG/ML — HIGH (ref 0–300)
OVALOCYTES BLD QL SMEAR: SLIGHT — SIGNIFICANT CHANGE UP
PCO2 BLDV: 41 MMHG — LOW (ref 42–55)
PH BLDV: 7.48 — HIGH (ref 7.32–7.43)
PH UR: 6.5 — SIGNIFICANT CHANGE UP (ref 5–8)
PLAT MORPH BLD: NORMAL — SIGNIFICANT CHANGE UP
PLATELET # BLD AUTO: 169 K/UL — SIGNIFICANT CHANGE UP (ref 150–400)
PO2 BLDV: 191 MMHG — HIGH (ref 25–45)
POIKILOCYTOSIS BLD QL AUTO: SIGNIFICANT CHANGE UP
POLYCHROMASIA BLD QL SMEAR: SLIGHT — SIGNIFICANT CHANGE UP
POTASSIUM BLDV-SCNC: 2.8 MMOL/L — CRITICAL LOW (ref 3.5–5.1)
POTASSIUM SERPL-MCNC: 3 MMOL/L — LOW (ref 3.5–5.3)
POTASSIUM SERPL-SCNC: 3 MMOL/L — LOW (ref 3.5–5.3)
PROT SERPL-MCNC: 5.8 G/DL — LOW (ref 6.6–8.7)
PROT UR-MCNC: NEGATIVE — SIGNIFICANT CHANGE UP
PROTHROM AB SERPL-ACNC: 14 SEC — HIGH (ref 10.5–13.4)
RAPID RVP RESULT: SIGNIFICANT CHANGE UP
RBC # BLD: 3.69 M/UL — LOW (ref 4.2–5.8)
RBC # FLD: 23.1 % — HIGH (ref 10.3–14.5)
RBC BLD AUTO: ABNORMAL
RBC CASTS # UR COMP ASSIST: ABNORMAL /HPF (ref 0–4)
SAO2 % BLDV: 99.7 % — SIGNIFICANT CHANGE UP
SARS-COV-2 RNA SPEC QL NAA+PROBE: SIGNIFICANT CHANGE UP
SODIUM SERPL-SCNC: 143 MMOL/L — SIGNIFICANT CHANGE UP (ref 135–145)
SP GR SPEC: 1.01 — SIGNIFICANT CHANGE UP (ref 1.01–1.02)
TROPONIN T SERPL-MCNC: 0.13 NG/ML — HIGH (ref 0–0.06)
UROBILINOGEN FLD QL: NEGATIVE — SIGNIFICANT CHANGE UP
VARIANT LYMPHS # BLD: 2.6 % — SIGNIFICANT CHANGE UP (ref 0–6)
WBC # BLD: 7.03 K/UL — SIGNIFICANT CHANGE UP (ref 3.8–10.5)
WBC # FLD AUTO: 7.03 K/UL — SIGNIFICANT CHANGE UP (ref 3.8–10.5)
WBC UR QL: ABNORMAL /HPF (ref 0–5)

## 2023-02-03 PROCEDURE — 93010 ELECTROCARDIOGRAM REPORT: CPT

## 2023-02-03 PROCEDURE — 99291 CRITICAL CARE FIRST HOUR: CPT

## 2023-02-03 PROCEDURE — 71045 X-RAY EXAM CHEST 1 VIEW: CPT | Mod: 26

## 2023-02-03 PROCEDURE — 99233 SBSQ HOSP IP/OBS HIGH 50: CPT

## 2023-02-03 RX ORDER — CEFTRIAXONE 500 MG/1
1000 INJECTION, POWDER, FOR SOLUTION INTRAMUSCULAR; INTRAVENOUS EVERY 24 HOURS
Refills: 0 | Status: DISCONTINUED | OUTPATIENT
Start: 2023-02-03 | End: 2023-02-05

## 2023-02-03 RX ORDER — METOPROLOL TARTRATE 50 MG
25 TABLET ORAL DAILY
Refills: 0 | Status: DISCONTINUED | OUTPATIENT
Start: 2023-02-03 | End: 2023-02-04

## 2023-02-03 RX ORDER — CEFTRIAXONE 500 MG/1
1000 INJECTION, POWDER, FOR SOLUTION INTRAMUSCULAR; INTRAVENOUS ONCE
Refills: 0 | Status: COMPLETED | OUTPATIENT
Start: 2023-02-03 | End: 2023-02-03

## 2023-02-03 RX ORDER — LEVOTHYROXINE SODIUM 125 MCG
50 TABLET ORAL DAILY
Refills: 0 | Status: DISCONTINUED | OUTPATIENT
Start: 2023-02-03 | End: 2023-02-05

## 2023-02-03 RX ORDER — POTASSIUM CHLORIDE 20 MEQ
40 PACKET (EA) ORAL ONCE
Refills: 0 | Status: COMPLETED | OUTPATIENT
Start: 2023-02-03 | End: 2023-02-03

## 2023-02-03 RX ORDER — ALLOPURINOL 300 MG
100 TABLET ORAL
Refills: 0 | Status: DISCONTINUED | OUTPATIENT
Start: 2023-02-03 | End: 2023-02-05

## 2023-02-03 RX ORDER — ENOXAPARIN SODIUM 100 MG/ML
40 INJECTION SUBCUTANEOUS EVERY 24 HOURS
Refills: 0 | Status: DISCONTINUED | OUTPATIENT
Start: 2023-02-03 | End: 2023-02-05

## 2023-02-03 RX ORDER — PANTOPRAZOLE SODIUM 20 MG/1
40 TABLET, DELAYED RELEASE ORAL
Refills: 0 | Status: DISCONTINUED | OUTPATIENT
Start: 2023-02-03 | End: 2023-02-05

## 2023-02-03 RX ORDER — ASPIRIN/CALCIUM CARB/MAGNESIUM 324 MG
325 TABLET ORAL ONCE
Refills: 0 | Status: COMPLETED | OUTPATIENT
Start: 2023-02-03 | End: 2023-02-03

## 2023-02-03 RX ORDER — MAGNESIUM SULFATE 500 MG/ML
2 VIAL (ML) INJECTION ONCE
Refills: 0 | Status: COMPLETED | OUTPATIENT
Start: 2023-02-03 | End: 2023-02-03

## 2023-02-03 RX ORDER — ATORVASTATIN CALCIUM 80 MG/1
20 TABLET, FILM COATED ORAL AT BEDTIME
Refills: 0 | Status: DISCONTINUED | OUTPATIENT
Start: 2023-02-03 | End: 2023-02-05

## 2023-02-03 RX ORDER — HYDRALAZINE HCL 50 MG
50 TABLET ORAL
Refills: 0 | Status: DISCONTINUED | OUTPATIENT
Start: 2023-02-03 | End: 2023-02-05

## 2023-02-03 RX ORDER — CEFTRIAXONE 500 MG/1
1000 INJECTION, POWDER, FOR SOLUTION INTRAMUSCULAR; INTRAVENOUS ONCE
Refills: 0 | Status: DISCONTINUED | OUTPATIENT
Start: 2023-02-03 | End: 2023-02-03

## 2023-02-03 RX ORDER — POTASSIUM CHLORIDE 20 MEQ
10 PACKET (EA) ORAL
Refills: 0 | Status: COMPLETED | OUTPATIENT
Start: 2023-02-03 | End: 2023-02-03

## 2023-02-03 RX ORDER — SPIRONOLACTONE 25 MG/1
25 TABLET, FILM COATED ORAL DAILY
Refills: 0 | Status: DISCONTINUED | OUTPATIENT
Start: 2023-02-03 | End: 2023-02-05

## 2023-02-03 RX ORDER — BUMETANIDE 0.25 MG/ML
1 INJECTION INTRAMUSCULAR; INTRAVENOUS
Refills: 0 | Status: DISCONTINUED | OUTPATIENT
Start: 2023-02-03 | End: 2023-02-05

## 2023-02-03 RX ORDER — NITROGLYCERIN 6.5 MG
1 CAPSULE, EXTENDED RELEASE ORAL ONCE
Refills: 0 | Status: COMPLETED | OUTPATIENT
Start: 2023-02-03 | End: 2023-02-03

## 2023-02-03 RX ADMIN — Medication 50 MILLIGRAM(S): at 19:13

## 2023-02-03 RX ADMIN — Medication 325 MILLIGRAM(S): at 09:55

## 2023-02-03 RX ADMIN — Medication 25 MILLIGRAM(S): at 16:14

## 2023-02-03 RX ADMIN — Medication 40 MILLIEQUIVALENT(S): at 09:56

## 2023-02-03 RX ADMIN — Medication 100 MILLIGRAM(S): at 21:05

## 2023-02-03 RX ADMIN — PANTOPRAZOLE SODIUM 40 MILLIGRAM(S): 20 TABLET, DELAYED RELEASE ORAL at 16:14

## 2023-02-03 RX ADMIN — Medication 100 MILLIEQUIVALENT(S): at 13:59

## 2023-02-03 RX ADMIN — CEFTRIAXONE 1000 MILLIGRAM(S): 500 INJECTION, POWDER, FOR SOLUTION INTRAMUSCULAR; INTRAVENOUS at 11:46

## 2023-02-03 RX ADMIN — Medication 25 GRAM(S): at 09:56

## 2023-02-03 RX ADMIN — ATORVASTATIN CALCIUM 20 MILLIGRAM(S): 80 TABLET, FILM COATED ORAL at 21:05

## 2023-02-03 RX ADMIN — SPIRONOLACTONE 25 MILLIGRAM(S): 25 TABLET, FILM COATED ORAL at 19:36

## 2023-02-03 RX ADMIN — Medication 100 MILLIEQUIVALENT(S): at 17:22

## 2023-02-03 RX ADMIN — Medication 100 MILLIEQUIVALENT(S): at 11:50

## 2023-02-03 RX ADMIN — BUMETANIDE 1 MILLIGRAM(S): 0.25 INJECTION INTRAMUSCULAR; INTRAVENOUS at 17:22

## 2023-02-03 NOTE — ED PROVIDER NOTE - OBJECTIVE STATEMENT
78 y/o male with PMHx of Pulmonary HTN, HLD, enlarged prostate presents to the ED c/o shaking since yesterday. He states he had similar when he had UTI in the past, notes he normally self catheterizes himself but states since yesterday he has been unable too. He also notes difficulty having a BM as well, last BM was yesterday morning as well. Pt denies fevers, chest pain, SOB, LOC. Pt also notes recent pacemaker placement and valve replacement at [place] a few weeks ago. 76 y/o male with PMHx of Pulmonary HTN, HLD, neurogenic bladder presents to the ED c/o shaking since yesterday. He states he had similar when he had UTI in the past, notes he normally self catheterizes himself but states since yesterday he has been unable too. He also notes difficulty having a BM as well, last BM was yesterday morning as well. Pt denies fevers, chest pain, SOB, LOC. Pt also notes recent pacemaker placement and valve replacement at [place] a few weeks ago.

## 2023-02-03 NOTE — ED PROVIDER NOTE - CARE PLAN
Principal Discharge DX:	Acute CHF  Secondary Diagnosis:	TIERRA (acute kidney injury)   1 Principal Discharge DX:	Acute CHF  Secondary Diagnosis:	TIERRA (acute kidney injury)  Secondary Diagnosis:	Acute UTI

## 2023-02-03 NOTE — ED PROVIDER NOTE - PHYSICAL EXAMINATION
Gen: elderly appearing male in NAD  Head: NC/AT  Neck: trachea midline  Card: regular rate and rhythm  Resp:  CTAB  Abd: soft, distended lower abdomen,  Ext: no deformities  Neuro:  A&O mild tremor with arm extension   Skin:  Warm and dry as visualized  Psych:  Normal affect and mood

## 2023-02-03 NOTE — H&P ADULT - HISTORY OF PRESENT ILLNESS
77 year old wife wife Afib/ Flutter not on AC due to bleeding and fall risk , JEANMARIE ligation, MV repair, HFpEF EF ~ 45% severe PHTN,  severe AS s/p TAVR complained by SSS s/p PPM at [facility], CKD with neurogenic bladder which he self catheterizes, today presents with chills without fever and patient was unable to self catheterizes found to have urinary retentions TIERRA on CKD hypokalemia.  The patient symptoms improved with Jain placement.   77 year old wife wife Afib/ Flutter not on AC due to bleeding and fall risk , JEANMARIE ligation, MV repair, HFpEF EF ~ 45% severe PHTN,  severe AS s/p TAVR complained by SSS s/p PPM at [facility], CKD with neurogenic bladder which he self catheterizes, today presents with chills without fever and patient was unable to self catheterizes found to have urinary retentions TIERRA on CKD hypokalemia.  The patient symptoms improved with Jain placement.    Per patient and son sitting at bedside he had developed diarrhea and the neurologist started him on diphenoxylateatropine2.5-0.025 and he developed difficulty passing stool and when he tried to self cath he felt like he hit a "wall"- states that in his experience with self cath whenever he can't pass stool he has trouble passing the catheter.   he has been taking his medications regularly states he has a nephrologist that he follows with and his kidney function is not normal but has been stable  77 year old wife wife Afib/ Flutter not on AC due to bleeding and fall risk , JEANMARIE ligation, MV repair, HFpEF EF ~ 45% severe PHTN,  severe AS s/p TAVR complained by SSS s/p PPM at [facility], CKD with neurogenic bladder which he self catheterizes, today presents with chills without fever and patient was unable to self catheterizes found to have urinary retentions TIERRA on CKD hypokalemia.  The patient symptoms improved with Jain placement.    Per patient and son sitting at bedside he had developed diarrhea and the neurologist started him on diphenoxylateatropine2.5-0.025 and he developed difficulty passing stool and when he tried to self cath he felt like he hit a "wall"- states that in his experience with self cath whenever he can't pass stool he has trouble passing the catheter.   he has been taking his medications regularly states he has a nephrologist that he follows with and his kidney function is not normal but has been stable around 2.7, today=- s creatinine is 2.5.

## 2023-02-03 NOTE — ED ADULT NURSE NOTE - OBJECTIVE STATEMENT
PT is A&Ox4, PT reports to ED with complaints of new onset gross tremors in Left hand starting this morning 2/3/23. PT denies any loss of conciousness, Blurred vision or NVD. PT states he straight caths at home due to neurogenic bladder and has not been able to cath for 24 hours. PT has tenderness in lower medial abdomen on palpation. PT has equal motor and sensory bilaterally x4 with no slurred speech. PT resting comfortably in ED stretcher with paced rhythm with PVCs on monitor. PT denies any chest pain or discomfort and is breathing equally and unlabored.

## 2023-02-03 NOTE — ED PROVIDER NOTE - ATTENDING CONTRIBUTION TO CARE
I, Conner Mishra, performed the initial face to face bedside interview with this patient regarding history of present illness, review of symptoms and relevant past medical, social and family history.  I completed an independent physical examination.  I was the initial provider who evaluated this patient. I have signed out the follow up of any pending tests (i.e. labs, radiological studies) to the resident.  I have communicated the patient’s plan of care and disposition with the resident  The history, relevant review of systems, past medical and surgical history, medical decision making, and physical examination was documented by the scribe in my presence and I attest to the accuracy of the documentation.

## 2023-02-03 NOTE — ED PROVIDER NOTE - CLINICAL SUMMARY MEDICAL DECISION MAKING FREE TEXT BOX
The patient presents with tremor, SOB generalized weakness and unable to pass straight cath and has CHF and worsening of renal function and will admit for further evaluation

## 2023-02-03 NOTE — ED ADULT TRIAGE NOTE - CHIEF COMPLAINT QUOTE
Pt states 2 days of b/l hand, legs tremor. Pt reports having had heart valve replaced and PM placed about 2-3 weeks ago.

## 2023-02-03 NOTE — PATIENT PROFILE ADULT - FALL HARM RISK - RISK INTERVENTIONS

## 2023-02-03 NOTE — H&P ADULT - NSHPPHYSICALEXAM_GEN_ALL_CORE
GENERAL: NAD, pleasant male conversing - cooperative   HEAD:  Atraumatic, Normocephalic  EYES: EOMI, PERRL, conjunctiva and sclera clear  ENMT: No tonsillar erythema, exudates, or enlargement; Moist mucous membranes,   NECK: Supple, Normal thyroid  NERVOUS SYSTEM:  Alert & Oriented X3, ; Moves B/L upper and lower extremities;   CHEST/LUNG: Clear to percussion bilaterally; No rales, rhonchi, wheezing, or rubs  HEART: Regular rate and rhythm; No murmurs, rubs, or gallops  ABDOMEN: Soft, Nontender, Nondistended; Bowel sounds present  EXTREMITIES:  2+ Peripheral Pulses, No clubbing, cyanosis, or edema  LYMPH: No lymphadenopathy noted  SKIN: No rashes or lesions

## 2023-02-03 NOTE — H&P ADULT - ASSESSMENT
77 year old wife wife Afib/ Flutter not on AC due to bleeding and fall risk , JEANMARIE ligation, MV repair, HFpEF EF ~ 45% severe PHTN,  severe AS s/p TAVR complained by SSS s/p PPM at [facility], CKD with neurogenic bladder which he self catheterizes, today presents with chills without fever and patient was unable to self catheterizes found to have urinary retentions TIERRA on CKD hypokalemia.  The patient symptoms improved with Jain placement.      TIERRA on CKD- with hypokalemia   77 year old wife wife Afib/ Flutter not on AC due to bleeding and fall risk , JEANMARIE ligation, MV repair, HFpEF EF ~ 45% severe PHTN,  severe AS s/p TAVR complained by SSS s/p PPM at [facility], CKD with neurogenic bladder which he self catheterizes, today presents with chills without fever and patient was unable to self catheterizes found to have urinary retentions TIERRA on CKD hypokalemia.  The patient symptoms improved with Jain placement.        Difficulty self catheterizing       TIERRA on CKD- with hypokalemia       77 year old wife wife Afib/ Flutter not on AC due to bleeding and fall risk , JEANMARIE ligation, MV repair, HFpEF EF ~ 45% severe PHTN,  severe AS s/p TAVR complained by SSS s/p PPM at [facility], CKD with neurogenic bladder which he self catheterizes, today presents with chills without fever and patient was unable to self catheterizes found to have urinary retentions TIERRA on CKD hypokalemia.    The patient symptoms improved with Jain placement.      shaking of hands- has since improved since coming to ED    Difficulty self catheterizing -   probably along with constipation -secondary to Diphenoxylate atropine  now with Jain- dc diphenoxylate atropine    TIERRA on CKD- with hypokalemia    Afib- rate controlled - not on AC due to fall risk cont metoprolol    CHFpEF with Severe Pul hypertension  cont Bumex    DVt PPX- Lovenox            77 year old wife wife Afib/ Flutter not on AC due to bleeding and fall risk , JEANMARIE ligation, MV repair, HFpEF EF ~ 45% severe PHTN,  severe AS s/p TAVR complained by SSS s/p PPM at [facility], CKD with neurogenic bladder which he self catheterizes, today presents with chills without fever and patient was unable to self catheterizes found to have urinary retentions TIERRA on CKD hypokalemia.    The patient symptoms improved with Jain placement.      shaking of hands- has since improved since coming to ED    Difficulty self catheterizing -   probably along with constipation -secondary to Diphenoxylate atropine  now with Jain- dc diphenoxylate atropine    CKD- stable S creatinine 2.5    Afib- rate controlled - cont metoprolol  not on AC due to fall risk     CHFpEF with Severe Pul hypertension -with hypokalemia  On Bumex and Metolazone cont - potassium supplement ed in ED  Sildenafil   Bumex + spironolactone (for Kerendia)  TTE post TAVR 12/2022 EF ~ 45% DDD LVH normal functioning TAVR MV repair with mild MR severer TR/PHN    appreciate cariology input     clinically no evidence of acute component     Gout allopurinol    Hypothyroidim- Synthroid     DVT PPX- Lovenox            77 year old wife wife Afib/ Flutter not on AC due to bleeding and fall risk , JEANMARIE ligation, MV repair, HFpEF EF ~ 45% severe PHTN,  severe AS s/p TAVR complained by SSS s/p PPM at [facility], CKD with neurogenic bladder which he self catheterizes, today presents with chills without fever and patient was unable to self catheterizes found to have urinary retentions TIERRA on CKD hypokalemia.    The patient symptoms improved with Jain placement.      shaking of hands- has since improved since coming to ED    Neurogenic bladder with possible CAUTI- UA positive- Rocephin follow culture     Difficulty self catheterizing -   probably along with constipation -secondary to Diphenoxylate atropine  now with Jain- dc diphenoxylate atropine    CKD- stable S creatinine 2.5    Afib- rate controlled - cont metoprolol  not on AC due to fall risk     CHFpEF with Severe Pul hypertension -with hypokalemia  On Bumex and Metolazone cont - potassium supplement ed in ED  Sildenafil   Bumex + spironolactone (for Kerendia)  TTE post TAVR 12/2022 EF ~ 45% DDD LVH normal functioning TAVR MV repair with mild MR severer TR/PHN    appreciate cariology input     clinically no evidence of acute component     Gout allopurinol    Hypothyroidim- Synthroid     DVT PPX- Lovenox            77 year old wife wife Afib/ Flutter not on AC due to bleeding and fall risk , JEANMARIE ligation, MV repair, HFpEF EF ~ 45% severe PHTN,  severe AS s/p TAVR complained by SSS s/p PPM at [facility], CKD with neurogenic bladder which he self catheterizes, today presents with chills without fever and patient was unable to self catheterizes found to have urinary retentions TIERRA on CKD hypokalemia.    The patient symptoms improved with Jain placement.      shaking of hands- has since improved since coming to ED    Neurogenic bladder with possible CAUTI- UA positive- Rocephin follow culture     Difficulty self catheterizing -   probably along with constipation -secondary to Diphenoxylate atropine  now with [facility]jin- dc diphenoxylate atropine  called and discussed with Dr Perdomo urology- will evaluate    CKD- stable S creatinine 2.5, poor clearance of troponin and BNP    Afib- rate controlled - cont metoprolol  not on AC due to fall risk     CHFpEF with Severe Pul hypertension -with hypokalemia- NO clinical exacerbation   On Bumex and Metolazone cont - potassium supplement ed in ED  Sildenafil   Bumex + spironolactone (for Kerendia)  TTE post TAVR 12/2022 EF ~ 45% DDD LVH normal functioning TAVR MV repair with mild MR severer TR/PHN    appreciate cariology input     clinically no evidence of acute component     Gout allopurinol    Hypothyroidism Synthroid     GERD-PPI    DVT PPX- Lovenox

## 2023-02-03 NOTE — ED CLERICAL - CLERICAL COMMENTS
consults called to [place] [facility] consults called to [place] [facility]. [facility] cardiology also called for consult

## 2023-02-03 NOTE — CONSULT NOTE ADULT - ASSESSMENT
CKD(IV): now with urinary retention (failed self catheterization--> pt met "resistance" when he attempted earlier today)  Chills r/o infection  Eubanks with >1L urine  - avoid potential nephrotoxins  - cont eubanks  - monitor labs  - consider  evaluation    Anemia: +CKD  - no TAD needed now  - trend labs

## 2023-02-03 NOTE — CHART NOTE - NSCHARTNOTEFT_GEN_A_CORE
Patient admitted with Neurogenic bladder with possible CAUTI, also with difficulty self catheterizing  Jain placed this AM, called by RN patient with hematuria seen in Jain bag   Likely 2/2 to traumatic Jain placement  Urology consulted by day shift   VSS   Will order STAT CBC and trend for AM   RN to notify with any acute changes Patient admitted with Neurogenic bladder with possible CAUTI, also with difficulty self catheterizing  Jain placed this AM, called by RN patient with hematuria seen in Jain bag   Likely 2/2 to traumatic Jain placement  Urology consulted by day shift   Also 6 beats of AIVR, now back to baseline rhythm   Patient asymptomatic   VSS   Will order STAT CBC and trend for AM   STAT BMP, Mag, Phos also ordered   RN to notify with any acute changes Patient admitted with Neurogenic bladder with possible CAUTI, also with difficulty self catheterizing  Jain placed this AM, called by RN patient with hematuria seen in Jain bag   Likely 2/2 to traumatic Jain placement  Urology consulted by day shift   Also 6 beats of AIVR, now back to baseline rhythm   Patient asymptomatic   VSS   Will order STAT CBC and trend for AM   STAT BMP, Mag, Phos also ordered   RN to notify with any acute changes    Addendum 00:30  Potassium 3.6 -> supplemented for goal >4   Repeat BMP in AM   All other labs WNL

## 2023-02-04 LAB
ALBUMIN SERPL ELPH-MCNC: 2.6 G/DL — LOW (ref 3.3–5.2)
ALP SERPL-CCNC: 114 U/L — SIGNIFICANT CHANGE UP (ref 40–120)
ALT FLD-CCNC: 22 U/L — SIGNIFICANT CHANGE UP
ANION GAP SERPL CALC-SCNC: 13 MMOL/L — SIGNIFICANT CHANGE UP (ref 5–17)
ANION GAP SERPL CALC-SCNC: 14 MMOL/L — SIGNIFICANT CHANGE UP (ref 5–17)
ANION GAP SERPL CALC-SCNC: 15 MMOL/L — SIGNIFICANT CHANGE UP (ref 5–17)
AST SERPL-CCNC: 30 U/L — SIGNIFICANT CHANGE UP
BASOPHILS # BLD AUTO: 0.05 K/UL — SIGNIFICANT CHANGE UP (ref 0–0.2)
BASOPHILS NFR BLD AUTO: 0.5 % — SIGNIFICANT CHANGE UP (ref 0–2)
BILIRUB SERPL-MCNC: 0.5 MG/DL — SIGNIFICANT CHANGE UP (ref 0.4–2)
BUN SERPL-MCNC: 126 MG/DL — HIGH (ref 8–20)
BUN SERPL-MCNC: 126.4 MG/DL — HIGH (ref 8–20)
BUN SERPL-MCNC: 128.4 MG/DL — HIGH (ref 8–20)
CALCIUM SERPL-MCNC: 7.9 MG/DL — LOW (ref 8.4–10.5)
CALCIUM SERPL-MCNC: 8.2 MG/DL — LOW (ref 8.4–10.5)
CALCIUM SERPL-MCNC: 8.2 MG/DL — LOW (ref 8.4–10.5)
CHLORIDE SERPL-SCNC: 101 MMOL/L — SIGNIFICANT CHANGE UP (ref 96–108)
CHLORIDE SERPL-SCNC: 102 MMOL/L — SIGNIFICANT CHANGE UP (ref 96–108)
CHLORIDE SERPL-SCNC: 102 MMOL/L — SIGNIFICANT CHANGE UP (ref 96–108)
CO2 SERPL-SCNC: 27 MMOL/L — SIGNIFICANT CHANGE UP (ref 22–29)
CO2 SERPL-SCNC: 27 MMOL/L — SIGNIFICANT CHANGE UP (ref 22–29)
CO2 SERPL-SCNC: 28 MMOL/L — SIGNIFICANT CHANGE UP (ref 22–29)
CREAT SERPL-MCNC: 2.55 MG/DL — HIGH (ref 0.5–1.3)
CREAT SERPL-MCNC: 2.57 MG/DL — HIGH (ref 0.5–1.3)
CREAT SERPL-MCNC: 2.7 MG/DL — HIGH (ref 0.5–1.3)
EGFR: 24 ML/MIN/1.73M2 — LOW
EGFR: 25 ML/MIN/1.73M2 — LOW
EGFR: 25 ML/MIN/1.73M2 — LOW
EOSINOPHIL # BLD AUTO: 0.09 K/UL — SIGNIFICANT CHANGE UP (ref 0–0.5)
EOSINOPHIL NFR BLD AUTO: 0.9 % — SIGNIFICANT CHANGE UP (ref 0–6)
GLUCOSE SERPL-MCNC: 114 MG/DL — HIGH (ref 70–99)
GLUCOSE SERPL-MCNC: 126 MG/DL — HIGH (ref 70–99)
GLUCOSE SERPL-MCNC: 130 MG/DL — HIGH (ref 70–99)
HCT VFR BLD CALC: 33.9 % — LOW (ref 39–50)
HCT VFR BLD CALC: 34.2 % — LOW (ref 39–50)
HGB BLD-MCNC: 10.1 G/DL — LOW (ref 13–17)
HGB BLD-MCNC: 10.4 G/DL — LOW (ref 13–17)
IMM GRANULOCYTES NFR BLD AUTO: 0.5 % — SIGNIFICANT CHANGE UP (ref 0–0.9)
LYMPHOCYTES # BLD AUTO: 0.45 K/UL — LOW (ref 1–3.3)
LYMPHOCYTES # BLD AUTO: 4.7 % — LOW (ref 13–44)
MAGNESIUM SERPL-MCNC: 2.2 MG/DL — SIGNIFICANT CHANGE UP (ref 1.6–2.6)
MAGNESIUM SERPL-MCNC: 2.2 MG/DL — SIGNIFICANT CHANGE UP (ref 1.6–2.6)
MAGNESIUM SERPL-MCNC: 2.3 MG/DL — SIGNIFICANT CHANGE UP (ref 1.6–2.6)
MCHC RBC-ENTMCNC: 26.6 PG — LOW (ref 27–34)
MCHC RBC-ENTMCNC: 27.1 PG — SIGNIFICANT CHANGE UP (ref 27–34)
MCHC RBC-ENTMCNC: 29.8 GM/DL — LOW (ref 32–36)
MCHC RBC-ENTMCNC: 30.4 GM/DL — LOW (ref 32–36)
MCV RBC AUTO: 89.1 FL — SIGNIFICANT CHANGE UP (ref 80–100)
MCV RBC AUTO: 89.4 FL — SIGNIFICANT CHANGE UP (ref 80–100)
MONOCYTES # BLD AUTO: 1.04 K/UL — HIGH (ref 0–0.9)
MONOCYTES NFR BLD AUTO: 10.9 % — SIGNIFICANT CHANGE UP (ref 2–14)
NEUTROPHILS # BLD AUTO: 7.86 K/UL — HIGH (ref 1.8–7.4)
NEUTROPHILS NFR BLD AUTO: 82.5 % — HIGH (ref 43–77)
PHOSPHATE SERPL-MCNC: 4 MG/DL — SIGNIFICANT CHANGE UP (ref 2.4–4.7)
PHOSPHATE SERPL-MCNC: 4 MG/DL — SIGNIFICANT CHANGE UP (ref 2.4–4.7)
PHOSPHATE SERPL-MCNC: 4.1 MG/DL — SIGNIFICANT CHANGE UP (ref 2.4–4.7)
PLATELET # BLD AUTO: 180 K/UL — SIGNIFICANT CHANGE UP (ref 150–400)
PLATELET # BLD AUTO: 184 K/UL — SIGNIFICANT CHANGE UP (ref 150–400)
POTASSIUM SERPL-MCNC: 3.6 MMOL/L — SIGNIFICANT CHANGE UP (ref 3.5–5.3)
POTASSIUM SERPL-MCNC: 4 MMOL/L — SIGNIFICANT CHANGE UP (ref 3.5–5.3)
POTASSIUM SERPL-MCNC: 4.2 MMOL/L — SIGNIFICANT CHANGE UP (ref 3.5–5.3)
POTASSIUM SERPL-SCNC: 3.6 MMOL/L — SIGNIFICANT CHANGE UP (ref 3.5–5.3)
POTASSIUM SERPL-SCNC: 4 MMOL/L — SIGNIFICANT CHANGE UP (ref 3.5–5.3)
POTASSIUM SERPL-SCNC: 4.2 MMOL/L — SIGNIFICANT CHANGE UP (ref 3.5–5.3)
PROT SERPL-MCNC: 5.5 G/DL — LOW (ref 6.6–8.7)
RBC # BLD: 3.79 M/UL — LOW (ref 4.2–5.8)
RBC # BLD: 3.84 M/UL — LOW (ref 4.2–5.8)
RBC # FLD: 23.5 % — HIGH (ref 10.3–14.5)
RBC # FLD: 23.7 % — HIGH (ref 10.3–14.5)
SODIUM SERPL-SCNC: 142 MMOL/L — SIGNIFICANT CHANGE UP (ref 135–145)
SODIUM SERPL-SCNC: 142 MMOL/L — SIGNIFICANT CHANGE UP (ref 135–145)
SODIUM SERPL-SCNC: 143 MMOL/L — SIGNIFICANT CHANGE UP (ref 135–145)
WBC # BLD: 8.63 K/UL — SIGNIFICANT CHANGE UP (ref 3.8–10.5)
WBC # BLD: 9.54 K/UL — SIGNIFICANT CHANGE UP (ref 3.8–10.5)
WBC # FLD AUTO: 8.63 K/UL — SIGNIFICANT CHANGE UP (ref 3.8–10.5)
WBC # FLD AUTO: 9.54 K/UL — SIGNIFICANT CHANGE UP (ref 3.8–10.5)

## 2023-02-04 PROCEDURE — 99222 1ST HOSP IP/OBS MODERATE 55: CPT | Mod: FS

## 2023-02-04 PROCEDURE — 99233 SBSQ HOSP IP/OBS HIGH 50: CPT

## 2023-02-04 RX ORDER — SENNA PLUS 8.6 MG/1
2 TABLET ORAL DAILY
Refills: 0 | Status: DISCONTINUED | OUTPATIENT
Start: 2023-02-04 | End: 2023-02-05

## 2023-02-04 RX ORDER — POLYETHYLENE GLYCOL 3350 17 G/17G
17 POWDER, FOR SOLUTION ORAL
Refills: 0 | Status: DISCONTINUED | OUTPATIENT
Start: 2023-02-04 | End: 2023-02-04

## 2023-02-04 RX ORDER — POLYETHYLENE GLYCOL 3350 17 G/17G
17 POWDER, FOR SOLUTION ORAL
Refills: 0 | Status: DISCONTINUED | OUTPATIENT
Start: 2023-02-04 | End: 2023-02-05

## 2023-02-04 RX ORDER — POTASSIUM CHLORIDE 20 MEQ
40 PACKET (EA) ORAL ONCE
Refills: 0 | Status: COMPLETED | OUTPATIENT
Start: 2023-02-04 | End: 2023-02-04

## 2023-02-04 RX ORDER — METOPROLOL TARTRATE 50 MG
25 TABLET ORAL DAILY
Refills: 0 | Status: DISCONTINUED | OUTPATIENT
Start: 2023-02-04 | End: 2023-02-05

## 2023-02-04 RX ADMIN — Medication 100 MILLIGRAM(S): at 06:26

## 2023-02-04 RX ADMIN — Medication 50 MILLIGRAM(S): at 17:57

## 2023-02-04 RX ADMIN — SENNA PLUS 2 TABLET(S): 8.6 TABLET ORAL at 14:04

## 2023-02-04 RX ADMIN — ATORVASTATIN CALCIUM 20 MILLIGRAM(S): 80 TABLET, FILM COATED ORAL at 21:20

## 2023-02-04 RX ADMIN — Medication 25 MILLIGRAM(S): at 09:00

## 2023-02-04 RX ADMIN — Medication 50 MICROGRAM(S): at 06:26

## 2023-02-04 RX ADMIN — Medication 20 MILLIGRAM(S): at 06:26

## 2023-02-04 RX ADMIN — PANTOPRAZOLE SODIUM 40 MILLIGRAM(S): 20 TABLET, DELAYED RELEASE ORAL at 06:29

## 2023-02-04 RX ADMIN — Medication 20 MILLIGRAM(S): at 14:04

## 2023-02-04 RX ADMIN — Medication 50 MILLIGRAM(S): at 09:00

## 2023-02-04 RX ADMIN — Medication 20 MILLIGRAM(S): at 00:25

## 2023-02-04 RX ADMIN — CEFTRIAXONE 1000 MILLIGRAM(S): 500 INJECTION, POWDER, FOR SOLUTION INTRAMUSCULAR; INTRAVENOUS at 14:04

## 2023-02-04 RX ADMIN — Medication 40 MILLIEQUIVALENT(S): at 01:01

## 2023-02-04 RX ADMIN — Medication 20 MILLIGRAM(S): at 21:24

## 2023-02-04 RX ADMIN — ENOXAPARIN SODIUM 40 MILLIGRAM(S): 100 INJECTION SUBCUTANEOUS at 09:00

## 2023-02-04 RX ADMIN — Medication 100 MILLIGRAM(S): at 17:57

## 2023-02-04 RX ADMIN — SPIRONOLACTONE 25 MILLIGRAM(S): 25 TABLET, FILM COATED ORAL at 06:26

## 2023-02-04 RX ADMIN — BUMETANIDE 1 MILLIGRAM(S): 0.25 INJECTION INTRAMUSCULAR; INTRAVENOUS at 15:32

## 2023-02-04 NOTE — PROGRESS NOTE ADULT - SUBJECTIVE AND OBJECTIVE BOX
LETICIA MCCLURE Patient is a 77y old  Male who presents with a chief complaint of urinary retention (2023 14:00)     HPI:  77 year old wife wife Afib/ Flutter not on AC due to bleeding and fall risk , JEANMARIE ligation, MV repair, HFpEF EF ~ 45% severe PHTN,  severe AS s/p TAVR complained by SSS s/p PPM at [facility], CKD with neurogenic bladder which he self catheterizes, today presents with chills without fever and patient was unable to self catheterizes found to have urinary retentions TIERRA on CKD hypokalemia.  The patient symptoms improved with Jain placement.    Per patient and son sitting at bedside he had developed diarrhea and the neurologist started him on diphenoxylateatropine2.5-0.025 and he developed difficulty passing stool and when he tried to self cath he felt like he hit a "wall"- states that in his experience with self cath whenever he can't pass stool he has trouble passing the catheter.   he has been taking his medications regularly states he has a nephrologist that he follows with and his kidney function is not normal but has been stable around 2.7, today=- s creatinine is 2.5. (2023 10:59)    The patient was seen and evaluated - sitting up in chair- no tremor at rest but e hand does have some shaking when he lifts- almost like a deconditioning , also endorses- constipation- hasn't had a DBM yet- states can never self cath when he cant have a BM  The patient is in no acute distress.  Denied any fever chest pain, palpitations, shortness of breath, abdominal pain, fever, dysuria, cough, edema       I&O's Summary    2023 07:01  -  2023 07:00  --------------------------------------------------------  IN: 0 mL / OUT: 4500 mL / NET: -4500 mL    2023 07:01  -  2023 15:47  --------------------------------------------------------  IN: 0 mL / OUT: 1100 mL / NET: -1100 mL      Allergies    No Known Allergies    Intolerances      HEALTH ISSUES - PROBLEM Dx:  Acute UTI          PAST MEDICAL & SURGICAL HISTORY:  HTN (hypertension)      HLD (hyperlipidemia)      S/P TAVR (transcatheter aortic valve replacement)      History of cholecystectomy      H/O hernia repair      History of mitral valve repair              Vital Signs Last 24 Hrs  T(C): 36.6 (2023 08:12), Max: 36.6 (2023 19:29)  T(F): 97.9 (2023 08:12), Max: 97.9 (2023 08:12)  HR: 72 (2023 14:00) (72 - 93)  BP: 132/73 (2023 14:00) (105/63 - 132/73)  BP(mean): 77 (2023 06:23) (77 - 87)  RR: 18 (2023 08:12) (18 - 18)  SpO2: 97% (2023 08:12) (95% - 97%)    Parameters below as of 2023 06:23  Patient On (Oxygen Delivery Method): room air    T(C): 36.6 (23 @ 08:12), Max: 36.6 (23 @ 19:29)  HR: 72 (23 @ 14:00) (72 - 93)  BP: 132/73 (23 @ 14:00) (105/63 - 132/73)  RR: 18 (23 @ 08:12) (18 - 18)  SpO2: 97% (23 @ 08:12) (95% - 97%)  Wt(kg): --    PHYSICAL EXAM:    GENERAL: NAD, frail elderly sitting in chair conversing fully with it   HEAD:  Atraumatic, Normocephalic  EYES: EOMI, PERRL, conjunctiva and sclera clear  ENMT:  Moist mucous membranes,  No lesions  NECK: Supple, No JVD, Normal thyroid  NERVOUS SYSTEM:  Alert & Oriented X3,    Moves upper and lower extremities; CNS-II-XII,   some coarse mild tremor when he lifts his has no problem holding glass  CHEST/LUNG: Clear to auscultation bilaterally; No rales, rhonchi, wheezing,   HEART: Regular rate and rhythm; No murmurs,   ABDOMEN: Soft, Nontender, Nondistended; Bowel sounds present  EXTREMITIES:  Peripheral Pulses, No  cyanosis, or edema  SKIN: dry scaly with some tears where he had the dressings removed   psychiatry- mood and affect appropriate, Insight and judgement intact     allopurinol 100 milliGRAM(s) Oral two times a day  atorvastatin 20 milliGRAM(s) Oral at bedtime  buMETAnide 1 milliGRAM(s) Oral two times a day  cefTRIAXone Injectable. 1000 milliGRAM(s) IV Push every 24 hours  enoxaparin Injectable 40 milliGRAM(s) SubCutaneous every 24 hours  hydrALAZINE 50 milliGRAM(s) Oral two times a day  levothyroxine 50 MICROGram(s) Oral daily  metolazone 5 milliGRAM(s) Oral daily  metoprolol succinate ER 25 milliGRAM(s) Oral daily  pantoprazole    Tablet 40 milliGRAM(s) Oral before breakfast  polyethylene glycol 3350 17 Gram(s) Oral two times a day  senna 2 Tablet(s) Oral daily  sildenafil (REVATIO) 20 milliGRAM(s) Oral three times a day  spironolactone 25 milliGRAM(s) Oral daily      LABS:                          10.4   9.54  )-----------( 180      ( 2023 05:44 )             34.2     02-04    142  |  102  |  126.0<H>  ----------------------------<  114<H>  4.2   |  27.0  |  2.57<H>    Ca    8.2<L>      2023 05:44  Phos  4.1     02-04  Mg     2.3     02-03    TPro  5.5<L>  /  Alb  2.6<L>  /  TBili  0.5  /  DBili  x   /  AST  30  /  ALT  22  /  AlkPhos  114  02-04    LIVER FUNCTIONS - ( 2023 05:44 )  Alb: 2.6 g/dL / Pro: 5.5 g/dL / ALK PHOS: 114 U/L / ALT: 22 U/L / AST: 30 U/L / GGT: x           PT/INR - ( 2023 08:44 )   PT: 14.0 sec;   INR: 1.20 ratio         PTT - ( 2023 08:44 )  PTT:31.1 sec  CARDIAC MARKERS ( 2023 08:44 )  x     / 0.13 ng/mL / x     / x     / x          Urinalysis Basic - ( 2023 09:45 )    Color: Yellow / Appearance: Slightly Turbid / S.010 / pH: x  Gluc: x / Ketone: Negative  / Bili: Negative / Urobili: Negative   Blood: x / Protein: Negative / Nitrite: Positive   Leuk Esterase: Moderate / RBC: 3-5 /HPF / WBC 11-25 /HPF   Sq Epi: x / Non Sq Epi: Negative / Bacteria: Moderate      CAPILLARY BLOOD GLUCOSE          RADIOLOGY & ADDITIONAL TESTS:      Consultant notes reviewed    Case discussed with consultant/provider/ family /patient

## 2023-02-04 NOTE — PROGRESS NOTE ADULT - SUBJECTIVE AND OBJECTIVE BOX
[facility], [facility]                                   [address] [place] [address]                                                      PHONE: [phone]                                                         FAX: [phone]  [email or web address]  ---------------------------------------------------------------------------------------------------------------------------------    Overnight events/patient complaints:      NAD feeling well from CV standpoint     No Known Allergies    MEDICATIONS  (STANDING):  allopurinol 100 milliGRAM(s) Oral two times a day  atorvastatin 20 milliGRAM(s) Oral at bedtime  buMETAnide 1 milliGRAM(s) Oral two times a day  cefTRIAXone Injectable. 1000 milliGRAM(s) IV Push every 24 hours  enoxaparin Injectable 40 milliGRAM(s) SubCutaneous every 24 hours  hydrALAZINE 50 milliGRAM(s) Oral two times a day  levothyroxine 50 MICROGram(s) Oral daily  metolazone 5 milliGRAM(s) Oral daily  metoprolol succinate ER 25 milliGRAM(s) Oral daily  pantoprazole    Tablet 40 milliGRAM(s) Oral before breakfast  sildenafil (REVATIO) 20 milliGRAM(s) Oral three times a day  spironolactone 25 milliGRAM(s) Oral daily    MEDICATIONS  (PRN):      Vital Signs Last 24 Hrs  T(C): 36.6 (2023 08:12), Max: 36.6 (2023 19:29)  T(F): 97.9 (2023 08:12), Max: 97.9 (2023 08:12)  HR: 79 (2023 08:12) (79 - 98)  BP: 131/65 (2023 08:12) (105/55 - 131/65)  BP(mean): 77 (2023 06:23) (77 - 87)  RR: 18 (2023 08:12) (16 - 18)  SpO2: 97% (2023 08:12) (93% - 97%)    Parameters below as of 2023 06:23  Patient On (Oxygen Delivery Method): room air      ICU Vital Signs Last 24 Hrs  LETICIA MCCLURE  I&O's Detail    2023 07:01  -  2023 07:00  --------------------------------------------------------  IN:  Total IN: 0 mL    OUT:    Indwelling Catheter - Urethral (mL): 1200 mL    Voided (mL): 3300 mL  Total OUT: 4500 mL    Total NET: -4500 mL        I&O's Summary    2023 07:01  -  2023 07:00  --------------------------------------------------------  IN: 0 mL / OUT: 4500 mL / NET: -4500 mL      Drug Dosing Weight  LETICIA KAMI      PHYSICAL EXAM:  General: Appears well developed, alert and cooperative.  HEENT: Head; normocephalic, atraumatic.  Eyes: Pupils reactive, cornea wnl.  Neck: Supple, no nodes adenopathy, no NVD or carotid bruit or thyromegaly.  CARDIOVASCULAR: Normal S1 and S2, 1/6 murmur, rub, gallop or lift.   LUNGS: Decrease BS at the lower bases   ABDOMEN: Soft, nontender without mass or organomegaly. bowel sounds normoactive.  EXTREMITIES: No clubbing, cyanosis or edema. Distal pulses wnl.   SKIN: warm and dry with normal turgor.  NEURO: Alert/oriented x 3/normal motor exam. No pathologic reflexes.    PSYCH: normal affect.        LABS:                        10.4   9.54  )-----------( 180      ( 2023 05:44 )             34.2     02-04    142  |  102  |  126.0<H>  ----------------------------<  114<H>  4.2   |  27.0  |  2.57<H>    Ca    8.2<L>      2023 05:44  Phos  4.1     02-04  Mg     2.3     02-03    TPro  5.5<L>  /  Alb  2.6<L>  /  TBili  0.5  /  DBili  x   /  AST  30  /  ALT  22  /  AlkPhos  114  02-04    LETICIA MCCLURE  CARDIAC MARKERS ( 2023 08:44 )  x     / 0.13 ng/mL / x     / x     / x          PT/INR - ( 2023 08:44 )   PT: 14.0 sec;   INR: 1.20 ratio         PTT - ( 2023 08:44 )  PTT:31.1 sec  Urinalysis Basic - ( 2023 09:45 )    Color: Yellow / Appearance: Slightly Turbid / S.010 / pH: x  Gluc: x / Ketone: Negative  / Bili: Negative / Urobili: Negative   Blood: x / Protein: Negative / Nitrite: Positive   Leuk Esterase: Moderate / RBC: 3-5 /HPF / WBC 11-25 /HPF   Sq Epi: x / Non Sq Epi: Negative / Bacteria: Moderate        RADIOLOGY & ADDITIONAL STUDIES:    INTERPRETATION OF TELEMETRY (personally reviewed):      ECHO:  < from: Stress Echocardiogram (10.17.22 @ 11:35) >    Summary:   1. Technically adequate study.   2. Pharmacological stress Echocardiogram findings        Rest NAJMA 0.76 cm2 with Mean gradient 12.5 mmHg      Dobu 10 mcg NAJMA 0.71 cm2 with Mean gradient 17.1 mmHg      Dobu 15 mcg NAJMA 0.78 cm2 with Mean gradient 20.1 mmHg      Dobu 20mcg NAJMA 0.77 cm 2 with Mean gradient 26.8 mmHg        Increaes in stroke volume with higher dose of Dobutamine        Findings consistent with true aortic stenosis.    MD Jesus Electronically signed on 10/17/2022 at 3:57:43 PM      < end of copied text >      Assessment and Plan:  In summary, LETICIA MCCLURE is an 77y Male with past medical history significant for with past medical history significant for afib/flutter not on AC due to bleeding and fall risk and prior JEANMARIE ligation with his prior MV repair, HFpEF EF ~ 45% severe PHTN CKD severe AS s/p TAVR complained by SSS s/p PPM at [facility] neurogenic bladder presents with chills without fever and patient was unable to self catheterizes found to have urinary retentions TIERRA on CKD hypokalemia.  The patient symptoms improved with Jain placement.  Denies any chest pain orthopnea or PND.     UTI urinary retention improving with IV ABx and Jain     + trop due to renal clearness     TTE post TAVR 2022 EF ~ 45% DDD LVH normal functioning TAVR MV repair with mild MR severer TR/PHN        Chronic HFpEF without any evidence of acute component     Continue current cardiovascular medications

## 2023-02-04 NOTE — PROGRESS NOTE ADULT - SUBJECTIVE AND OBJECTIVE BOX
NEPHROLOGY INTERVAL HPI/OVERNIGHT EVENTS:  pt clinically stable  some hematuria noted overnight  no acute distress appreciated    MEDICATIONS  (STANDING):  allopurinol 100 milliGRAM(s) Oral two times a day  atorvastatin 20 milliGRAM(s) Oral at bedtime  buMETAnide 1 milliGRAM(s) Oral two times a day  cefTRIAXone Injectable. 1000 milliGRAM(s) IV Push every 24 hours  enoxaparin Injectable 40 milliGRAM(s) SubCutaneous every 24 hours  hydrALAZINE 50 milliGRAM(s) Oral two times a day  levothyroxine 50 MICROGram(s) Oral daily  metolazone 5 milliGRAM(s) Oral daily  metoprolol tartrate 25 milliGRAM(s) Oral daily  pantoprazole    Tablet 40 milliGRAM(s) Oral before breakfast  sildenafil (REVATIO) 20 milliGRAM(s) Oral three times a day  spironolactone 25 milliGRAM(s) Oral daily    MEDICATIONS  (PRN):      Allergies    No Known Allergies          Vital Signs Last 24 Hrs  T(C): 36.6 (2023 06:23), Max: 36.6 (2023 07:20)  T(F): 97.8 (2023 06:23), Max: 97.8 (2023 07:20)  HR: 80 (2023 06:23) (80 - 103)  BP: 105/63 (2023 06:23) (105/55 - 130/65)  BP(mean): 77 (2023 06:23) (77 - 87)  RR: 18 (2023 06:23) (16 - 18)  SpO2: 95% (2023 06:23) (92% - 96%)    Parameters below as of 2023 06:23  Patient On (Oxygen Delivery Method): room air        PHYSICAL EXAM:  GENERAL: Comfortable  EYES: Conjunctiva and sclera clear  ENMT: No periorbital edema  NECK: Supple, No JVD  NERVOUS SYSTEM:  Alert & Oriented X3, intact and symmetric  CHEST/LUNG: Clear bilaterally  HEART: Regular rate and rhythm; No rub  ABDOMEN: Soft, Nontender, +BS  EXTREMITIES:  2+ Peripheral Pulses, + tr dependent edema  SKIN: No rashes or lesions    LABS:                        10.1   8.63  )-----------( 184      ( 2023 23:46 )             33.9         143  |  102  |  128.4<H>  ----------------------------<  126<H>  3.6   |  28.0  |  2.70<H>    Ca    7.9<L>      2023 23:46  Phos  4.0       Mg     2.3         TPro  5.8<L>  /  Alb  2.6<L>  /  TBili  0.7  /  DBili  x   /  AST  36  /  ALT  26  /  AlkPhos  114      PT/INR - ( 2023 08:44 )   PT: 14.0 sec;   INR: 1.20 ratio         PTT - ( 2023 08:44 )  PTT:31.1 sec  Urinalysis Basic - ( 2023 09:45 )    Color: Yellow / Appearance: Slightly Turbid / S.010 / pH: x  Gluc: x / Ketone: Negative  / Bili: Negative / Urobili: Negative   Blood: x / Protein: Negative / Nitrite: Positive   Leuk Esterase: Moderate / RBC: 3-5 /HPF / WBC 11-25 /HPF   Sq Epi: x / Non Sq Epi: Negative / Bacteria: Moderate      Magnesium, Serum: 2.3 mg/dL ( @ 23:46)  Phosphorus Level, Serum: 4.0 mg/dL ( @ 23:46)      RADIOLOGY & ADDITIONAL TESTS:  < from: Xray Chest 1 View-PORTABLE IMMEDIATE (Xray Chest 1 View-PORTABLE IMMEDIATE .) (23 @ 13:44) >    ACC: [id number] EXAM:  XR CHEST PORTABLE IMMED 1V   ORDERED BY: GIRISH CAIN     PROCEDURE DATE:  2023          INTERPRETATION:  Portable chest radiograph    CLINICAL INFORMATION:   Short of breath.    TECHNIQUE:  Portable  AP view of the chest was obtained.    COMPARISON: 10/13/2022 chest x-ray available for review.    FINDINGS:  There is cardiomegaly, mild vascular congestion and perihilar   interstitial infiltrates without gross effusion. Constellation of   findings suggestive of CHF.  Status post cardiac valve replacement.  Cardiac device wire leads are within right atrium and right ventricle.    Trachea midline. No hilar mass.  Visualized osseous structures are intact.        IMPRESSION:   Constellation of findings suggestive of CHF..    < end of copied text >

## 2023-02-04 NOTE — CONSULT NOTE ADULT - SUBJECTIVE AND OBJECTIVE BOX
[facility], [facility]                                   [address] [place] [address]                                                      PHONE: [phone]                                                         FAX: [phone]  [email or web address]  ---------------------------------------------------------------------------------------------------------------------------------    Reason for Consult: + trop     HPI:  LETICIA MCCLURE is an 77y Male with past medical history significant for afib/flutter not on AC due to bleeding and fall risk and prior JEANMARIE ligation with his prior MV repair, HFpEF EF ~ 45% severe PHTN CKD severe AS s/p TAVR complained by SSS s/p PPM at [facility] neurogenic bladder presents with chills without fever and patient was unable to self catheterizes found to have urinary retentions TIERRA on CKD hypokalemia.  The patient symptoms improved with Jain placement.  Denies any chest pain orthopnea or PND.          PAST MEDICAL & SURGICAL HISTORY:  HTN (hypertension)      HLD (hyperlipidemia)      No significant past surgical history          No Known Allergies      MEDICATIONS  (STANDING):  potassium chloride  10 mEq/100 mL IVPB 10 milliEquivalent(s) IV Intermittent every 1 hour    MEDICATIONS  (PRN):      Social History:  Cigarettes:     none                Alchohol:     none           Illicit Drug Abuse:  none     FH negative for CAD     ROS: Negative other than as mentioned in HPI.    Vital Signs Last 24 Hrs  T(C): 36.6 (03 Feb 2023 07:20), Max: 36.6 (03 Feb 2023 07:20)  T(F): 97.8 (03 Feb 2023 07:20), Max: 97.8 (03 Feb 2023 07:20)  HR: 90 (03 Feb 2023 10:54) (90 - 103)  BP: 105/55 (03 Feb 2023 10:54) (105/55 - 114/71)  BP(mean): --  RR: 16 (03 Feb 2023 10:54) (16 - 16)  SpO2: 93% (03 Feb 2023 10:54) (92% - 93%)    Parameters below as of 03 Feb 2023 10:54  Patient On (Oxygen Delivery Method): room air      ICU Vital Signs Last 24 Hrs  LETICIA KAMI  I&O's Detail    03 Feb 2023 07:01  -  03 Feb 2023 10:32  --------------------------------------------------------  IN:  Total IN: 0 mL    OUT:    Voided (mL): 1300 mL  Total OUT: 1300 mL    Total NET: -1300 mL        I&O's Summary    03 Feb 2023 07:01  -  03 Feb 2023 10:32  --------------------------------------------------------  IN: 0 mL / OUT: 1300 mL / NET: -1300 mL      Drug Dosing Weight  LETICIA MCCLURE      PHYSICAL EXAM:  General: Appears well developed, alert and cooperative.  HEENT: Head; normocephalic, atraumatic.  Eyes: Pupils reactive, cornea wnl.  Neck: Supple, no nodes adenopathy, no NVD or carotid bruit or thyromegaly.  CARDIOVASCULAR: Normal S1 and S2, No murmur, rub, gallop or lift.   LUNGS: No rales, rhonchi or wheeze. Normal breath sounds bilaterally.  ABDOMEN: Soft, nontender without mass or organomegaly. bowel sounds normoactive.  EXTREMITIES: No clubbing, cyanosis or edema. Distal pulses wnl.   SKIN: warm and dry with normal turgor.  NEURO: Alert/oriented x 3/normal motor exam. No pathologic reflexes.    PSYCH: normal affect.        LABS:                        10.0   7.03  )-----------( 169      ( 03 Feb 2023 08:44 )             32.0     02-03    143  |  101  |  129.5<H>  ----------------------------<  119<H>  3.0<L>   |  30.0<H>  |  2.57<H>    Ca    8.1<L>      03 Feb 2023 08:44    TPro  5.8<L>  /  Alb  2.6<L>  /  TBili  0.7  /  DBili  x   /  AST  36  /  ALT  26  /  AlkPhos  114  02-03    LETICIA MCCLURE  CARDIAC MARKERS ( 03 Feb 2023 08:44 )  x     / 0.13 ng/mL / x     / x     / x          PT/INR - ( 03 Feb 2023 08:44 )   PT: 14.0 sec;   INR: 1.20 ratio         PTT - ( 03 Feb 2023 08:44 )  PTT:31.1 sec  Urinalysis Basic - ( 03 Feb 2023 09:45 )    Color: x / Appearance: x / SG: x / pH: x  Gluc: x / Ketone: x  / Bili: x / Urobili: x   Blood: x / Protein: Negative / Nitrite: x   Leuk Esterase: x / RBC: x / WBC x   Sq Epi: x / Non Sq Epi: x / Bacteria: x        RADIOLOGY & ADDITIONAL STUDIES:    INTERPRETATION OF TELEMETRY (personally reviewed):    ECG: AF RBBB none  specifics changes     ECHO:  < from: Stress Echocardiogram (10.17.22 @ 11:35) >    Summary:   1. Technically adequate study.   2. Pharmacological stress Echocardiogram findings        Rest NAJMA 0.76 cm2 with Mean gradient 12.5 mmHg      Dobu 10 mcg NAJMA 0.71 cm2 with Mean gradient 17.1 mmHg      Dobu 15 mcg NAJMA 0.78 cm2 with Mean gradient 20.1 mmHg      Dobu 20mcg NAJMA 0.77 cm 2 with Mean gradient 26.8 mmHg        Increaes in stroke volume with higher dose of Dobutamine        Findings consistent with true aortic stenosis.    MD Jesus Electronically signed on 10/17/2022 at 3:57:43 PM      < end of copied text >      Assessment and Plan:  In summary, LETICIA MCCLURE is an 77y Male with past medical history significant for with past medical history significant for afib/flutter not on AC due to bleeding and fall risk and prior JEANMARIE ligation with his prior MV repair, HFpEF EF ~ 45% severe PHTN CKD severe AS s/p TAVR complained by SSS s/p PPM at [facility] neurogenic bladder presents with chills without fever and patient was unable to self catheterizes found to have urinary retentions TIERRA on CKD hypokalemia.  The patient symptoms improved with Jain placement.  Denies any chest pain orthopnea or PND.     + trop due to renal clearness     TTE post TAVR 12/2022 EF ~ 45% DDD LVH normal functioning TAVR MV repair with mild MR severer TR/PHN        Chronic HFpEF without any evidence of acute component     Continue current cardiovascular medications        
  HPI: The pt is a 77 year old Male PMH + CKD(IV) (sees his nephrologist regularly) + neurogenic bladder/self catheterizes who presented to the ED with chills and inability to produce urine via catheter; when he came to the ED found to have evidence of urinary retention and a eubanks catheter was successfully placed.  We are called regarding his renal failure. Pt sates that his Screat i2 2.7mg/dL at baseline.      PAST MEDICAL & SURGICAL HISTORY:  HTN (hypertension)    severe PHTN,     severe AS s/p TAVR    Afib/ Flutter    HLD (hyperlipidemia)      S/P TAVR (transcatheter aortic valve replacement)      History of cholecystectomy      H/O hernia repair      History of mitral valve repair     SSS s/p PPM      FAMILY HISTORY:  No pertinent family history in first degree relatives        Social History:    MEDICATIONS  (STANDING):  potassium chloride  10 mEq/100 mL IVPB 10 milliEquivalent(s) IV Intermittent every 1 hour    MEDICATIONS  (PRN):      Allergies    No Known Allergies    Intolerances        REVIEW OF SYSTEMS:    CONSTITUTIONAL: No fever, weight loss, + fatigue + chills  EYES: No eye pain, visual disturbances, or discharge  ENMT:  No difficulty hearing, tinnitus, vertigo; No sinus or throat pain  NECK: No pain or stiffness  BREASTS: No pain, masses, or nipple discharge  RESPIRATORY: No cough, wheezing, chills or hemoptysis; No shortness of breath  CARDIOVASCULAR: No chest pain, palpitations, dizziness, or leg swelling  GASTROINTESTINAL: No abdominal or epigastric pain. No nausea, vomiting, or hematemesis; No diarrhea or constipation. No melena or hematochezia.  GENITOURINARY: No dysuria, frequency, hematuria, or incontinence  NEUROLOGICAL: No headaches, memory loss, loss of strength, numbness, or tremors  SKIN: No itching, burning, rashes, or lesions   MUSCULOSKELETAL: No joint pain or swelling; No muscle, back, or extremity pain        Vital Signs Last 24 Hrs  T(C): 36.5 (2023 11:18), Max: 36.6 (2023 07:20)  T(F): 97.7 (2023 11:18), Max: 97.8 (2023 07:20)  HR: 93 (2023 11:18) (90 - 103)  BP: 111/59 (2023 11:18) (105/55 - 114/71)  BP(mean): --  RR: 17 (2023 11:18) (16 - 17)  SpO2: 96% (2023 11:18) (92% - 96%)    Parameters below as of 2023 11:18  Patient On (Oxygen Delivery Method): room air        PHYSICAL EXAM:    GENERAL: NAD, fatigued  HEAD:  Atraumatic, Normocephalic  EYES: EOMI, PERRLA, conjunctiva and sclera clear  ENMT:  Moist mucous membranes, Good dentition, No lesions  NECK: Supple, No JVD  NERVOUS SYSTEM:  Alert & Oriented X3, intact and symmetric  CHEST/LUNG: Clear bilaterally  HEART: Regular rate and rhythm; No rub  ABDOMEN: Soft, Nontender, +BS  EXTREMITIES:  2+ Peripheral Pulses, + tr dependent edema  SKIN: No rashes or lesions      LABS:                        10.0   7.03  )-----------( 169      ( 2023 08:44 )             32.0     02-03    143  |  101  |  129.5<H>  ----------------------------<  119<H>  3.0<L>   |  30.0<H>  |  2.57<H>    Ca    8.1<L>      2023 08:44    TPro  5.8<L>  /  Alb  2.6<L>  /  TBili  0.7  /  DBili  x   /  AST  36  /  ALT  26  /  AlkPhos  114  02-03    PT/INR - ( 2023 08:44 )   PT: 14.0 sec;   INR: 1.20 ratio         PTT - ( 2023 08:44 )  PTT:31.1 sec  Urinalysis Basic - ( 2023 09:45 )    Color: Yellow / Appearance: Slightly Turbid / S.010 / pH: x  Gluc: x / Ketone: Negative  / Bili: Negative / Urobili: Negative   Blood: x / Protein: Negative / Nitrite: Positive   Leuk Esterase: Moderate / RBC: 3-5 /HPF / WBC 11-25 /HPF   Sq Epi: x / Non Sq Epi: Negative / Bacteria: Moderate          RADIOLOGY & ADDITIONAL TESTS:  
UROLOGY CONSULT NOTE  --------------------------------------------------------------------------------------------    Patient is a 77y old  Male who presents with a chief complaint of urinary retention (2023 09:31)      HPI: 77 year old  Afib/ Flutter not on AC due to bleeding and fall risk , JEANMARIE ligation, MV repair, HFpEF EF ~ 45% severe PHTN,  severe AS s/p TAVR complained by SSS s/p PPM at [facility], CKD with neurogenic bladder which he self catheterizes, today presents with chills without fever and patient was unable to self catheterizes found to have urinary retentions TIERRA on CKD hypokalemia.  The patient symptoms improved with Eubanks placement.    Per patient he has difficulty self catheterizing when he does not have a bowel movement. States this happened on Friday. patient had eubanks placed in ED, subsequently developed hematuria.   Patient denies fevers/chills, denies lightheadedness/dizziness, denies SOB/chest pain, denies nausea/vomiting, denies constipation/diarrhea.      ROS: 10-system review is otherwise negative except HPI above.      PAST MEDICAL & SURGICAL HISTORY:  HTN (hypertension)      HLD (hyperlipidemia)      S/P TAVR (transcatheter aortic valve replacement)      History of cholecystectomy      H/O hernia repair      History of mitral valve repair        FAMILY HISTORY:  No pertinent family history in first degree relatives      [] Family history not pertinent as reviewed with the patient and family    ALLERGIES: No Known Allergies    CURRENT MEDICATIONS  MEDICATIONS (STANDING): allopurinol 100 milliGRAM(s) Oral two times a day  atorvastatin 20 milliGRAM(s) Oral at bedtime  buMETAnide 1 milliGRAM(s) Oral two times a day  cefTRIAXone Injectable. 1000 milliGRAM(s) IV Push every 24 hours  enoxaparin Injectable 40 milliGRAM(s) SubCutaneous every 24 hours  hydrALAZINE 50 milliGRAM(s) Oral two times a day  levothyroxine 50 MICROGram(s) Oral daily  metolazone 5 milliGRAM(s) Oral daily  metoprolol succinate ER 25 milliGRAM(s) Oral daily  pantoprazole    Tablet 40 milliGRAM(s) Oral before breakfast  polyethylene glycol 3350 17 Gram(s) Oral two times a day  senna 2 Tablet(s) Oral daily  sildenafil (REVATIO) 20 milliGRAM(s) Oral three times a day  spironolactone 25 milliGRAM(s) Oral daily    MEDICATIONS (PRN):  --------------------------------------------------------------------------------------------    Vitals:   T(C): 36.6 (23 @ 08:12), Max: 36.6 (23 @ 19:29)  HR: 72 (23 @ 14:00) (72 - 98)  BP: 132/73 (23 @ 14:00) (105/63 - 132/73)  RR: 18 (23 @ 08:12) (18 - 18)  SpO2: 97% (23 @ 08:12) (95% - 97%)  CAPILLARY BLOOD GLUCOSE     @ 07:01  -   @ 07:00  --------------------------------------------------------  IN:  Total IN: 0 mL    OUT:    Indwelling Catheter - Urethral (mL): 1200 mL    Voided (mL): 3300 mL  Total OUT: 4500 mL    Total NET: -4500 mL       @ 07:01  -   @ 14:01  --------------------------------------------------------  IN:  Total IN: 0 mL    OUT:    Voided (mL): 1100 mL  Total OUT: 1100 mL    Total NET: -1100 mL      Weight (kg): 93.9 ( @ 07:20)    PHYSICAL EXAM:   General: NAD, sitting in chair comfortably   Neuro: A+Ox3  Cardio: RRR  Resp: Good effort, non labored  GI/Abd: Soft, NT/ND, no rebound/guarding, no masses palpated  : eubanks with hematuria tea colored   Skin: Intact, no breakdown  Musculoskeletal: All 4 extremities moving spontaneously, no limitations.   --------------------------------------------------------------------------------------------    LABS  CBC ( @ 05:44)                              10.4<L>                         9.54    )----------------(  180        82.5<H>% Neutrophils, 4.7<L>% Lymphocytes, ANC: 7.86<H>                              34.2<L>  CBC ( @ 23:46)                              10.1<L>                         8.63    )----------------(  184        --    % Neutrophils, --    % Lymphocytes, ANC: --                                  33.9<L>    BMP ( @ 05:44)             142     |  102     |  126.0<H>		Ca++ --      Ca 8.2<L>             ---------------------------------( 114<H>		Mg --                 4.2     |  27.0    |  2.57<H>			Ph 4.1     BMP ( @ 23:46)             143     |  102     |  128.4<H>		Ca++ --      Ca 7.9<L>             ---------------------------------( 126<H>		Mg 2.3                3.6     |  28.0    |  2.70<H>			Ph 4.0       LFTs ( @ 05:44)      TPro 5.5<L> / Alb 2.6<L> / TBili 0.5 / DBili -- / AST 30 / ALT 22 / AlkPhos 114  LFTs ( @ 08:44)      TPro 5.8<L> / Alb 2.6<L> / TBili 0.7 / DBili -- / AST 36 / ALT 26 / AlkPhos 114    Coags ( @ 08:44)  aPTT 31.1 / INR 1.20<H> / PT 14.0<H>        VBG ( @ 08:44)     7.480<H> / 41<L> / 191<H> / 30<H> / 7.0<H> / 99.7%     Lactate: 1.50    --------------------------------------------------------------------------------------------    MICROBIOLOGY  Urinalysis ( @ 09:45):     Color: Yellow / Appearance: Slightly Turbid<!> / S.010 / pH: 6.5 / Gluc: Negative / Ketones: Negative / Bili: Negative / Urobili: Negative / Protein :Negative / Nitrites: Positive<!> / Leuk.Est: Moderate<!> / RBC: 3-5<!> / WBC: 11-25<!> / Sq Epi:  / Non Sq Epi: Negative / Bacteria Moderate<!>         --------------------------------------------------------------------------------------------    IMAGING      ASSESSMENT: Patient is a 77y old m with neurogenic bladder, unable to self catheterize at home, now with eubanks and subsequently developed hematuria.     PLAN:   - keep eubanks, will monitor urine color and can remove when urine clears   - discussed with Dr. Perdomo

## 2023-02-05 ENCOUNTER — TRANSCRIPTION ENCOUNTER (OUTPATIENT)
Age: 78
End: 2023-02-05

## 2023-02-05 VITALS
SYSTOLIC BLOOD PRESSURE: 112 MMHG | DIASTOLIC BLOOD PRESSURE: 90 MMHG | OXYGEN SATURATION: 97 % | HEART RATE: 85 BPM | RESPIRATION RATE: 18 BRPM

## 2023-02-05 LAB
ALBUMIN SERPL ELPH-MCNC: 2.6 G/DL — LOW (ref 3.3–5.2)
ALP SERPL-CCNC: 122 U/L — HIGH (ref 40–120)
ALT FLD-CCNC: 22 U/L — SIGNIFICANT CHANGE UP
ANION GAP SERPL CALC-SCNC: 13 MMOL/L — SIGNIFICANT CHANGE UP (ref 5–17)
AST SERPL-CCNC: 29 U/L — SIGNIFICANT CHANGE UP
BASOPHILS # BLD AUTO: 0.04 K/UL — SIGNIFICANT CHANGE UP (ref 0–0.2)
BASOPHILS NFR BLD AUTO: 0.5 % — SIGNIFICANT CHANGE UP (ref 0–2)
BILIRUB SERPL-MCNC: 0.4 MG/DL — SIGNIFICANT CHANGE UP (ref 0.4–2)
BUN SERPL-MCNC: 119.5 MG/DL — HIGH (ref 8–20)
CALCIUM SERPL-MCNC: 8.2 MG/DL — LOW (ref 8.4–10.5)
CHLORIDE SERPL-SCNC: 102 MMOL/L — SIGNIFICANT CHANGE UP (ref 96–108)
CO2 SERPL-SCNC: 27 MMOL/L — SIGNIFICANT CHANGE UP (ref 22–29)
CREAT SERPL-MCNC: 2.54 MG/DL — HIGH (ref 0.5–1.3)
EGFR: 25 ML/MIN/1.73M2 — LOW
EOSINOPHIL # BLD AUTO: 0.2 K/UL — SIGNIFICANT CHANGE UP (ref 0–0.5)
EOSINOPHIL NFR BLD AUTO: 2.4 % — SIGNIFICANT CHANGE UP (ref 0–6)
GLUCOSE SERPL-MCNC: 112 MG/DL — HIGH (ref 70–99)
HCT VFR BLD CALC: 34.5 % — LOW (ref 39–50)
HGB BLD-MCNC: 10.2 G/DL — LOW (ref 13–17)
IMM GRANULOCYTES NFR BLD AUTO: 0.6 % — SIGNIFICANT CHANGE UP (ref 0–0.9)
LYMPHOCYTES # BLD AUTO: 0.54 K/UL — LOW (ref 1–3.3)
LYMPHOCYTES # BLD AUTO: 6.5 % — LOW (ref 13–44)
MAGNESIUM SERPL-MCNC: 2.2 MG/DL — SIGNIFICANT CHANGE UP (ref 1.6–2.6)
MCHC RBC-ENTMCNC: 26.5 PG — LOW (ref 27–34)
MCHC RBC-ENTMCNC: 29.6 GM/DL — LOW (ref 32–36)
MCV RBC AUTO: 89.6 FL — SIGNIFICANT CHANGE UP (ref 80–100)
MONOCYTES # BLD AUTO: 0.98 K/UL — HIGH (ref 0–0.9)
MONOCYTES NFR BLD AUTO: 11.9 % — SIGNIFICANT CHANGE UP (ref 2–14)
NEUTROPHILS # BLD AUTO: 6.46 K/UL — SIGNIFICANT CHANGE UP (ref 1.8–7.4)
NEUTROPHILS NFR BLD AUTO: 78.1 % — HIGH (ref 43–77)
PHOSPHATE SERPL-MCNC: 4.4 MG/DL — SIGNIFICANT CHANGE UP (ref 2.4–4.7)
PLATELET # BLD AUTO: 179 K/UL — SIGNIFICANT CHANGE UP (ref 150–400)
POTASSIUM SERPL-MCNC: 3.9 MMOL/L — SIGNIFICANT CHANGE UP (ref 3.5–5.3)
POTASSIUM SERPL-SCNC: 3.9 MMOL/L — SIGNIFICANT CHANGE UP (ref 3.5–5.3)
PROT SERPL-MCNC: 5.6 G/DL — LOW (ref 6.6–8.7)
RBC # BLD: 3.85 M/UL — LOW (ref 4.2–5.8)
RBC # FLD: 23.8 % — HIGH (ref 10.3–14.5)
SODIUM SERPL-SCNC: 142 MMOL/L — SIGNIFICANT CHANGE UP (ref 135–145)
WBC # BLD: 8.27 K/UL — SIGNIFICANT CHANGE UP (ref 3.8–10.5)
WBC # FLD AUTO: 8.27 K/UL — SIGNIFICANT CHANGE UP (ref 3.8–10.5)

## 2023-02-05 PROCEDURE — 83880 ASSAY OF NATRIURETIC PEPTIDE: CPT

## 2023-02-05 PROCEDURE — 99285 EMERGENCY DEPT VISIT HI MDM: CPT | Mod: 25

## 2023-02-05 PROCEDURE — 36415 COLL VENOUS BLD VENIPUNCTURE: CPT

## 2023-02-05 PROCEDURE — 85018 HEMOGLOBIN: CPT

## 2023-02-05 PROCEDURE — 85730 THROMBOPLASTIN TIME PARTIAL: CPT

## 2023-02-05 PROCEDURE — 82947 ASSAY GLUCOSE BLOOD QUANT: CPT

## 2023-02-05 PROCEDURE — 84295 ASSAY OF SERUM SODIUM: CPT

## 2023-02-05 PROCEDURE — 71045 X-RAY EXAM CHEST 1 VIEW: CPT

## 2023-02-05 PROCEDURE — 84132 ASSAY OF SERUM POTASSIUM: CPT

## 2023-02-05 PROCEDURE — 85610 PROTHROMBIN TIME: CPT

## 2023-02-05 PROCEDURE — 82330 ASSAY OF CALCIUM: CPT

## 2023-02-05 PROCEDURE — 87040 BLOOD CULTURE FOR BACTERIA: CPT

## 2023-02-05 PROCEDURE — 85025 COMPLETE CBC W/AUTO DIFF WBC: CPT

## 2023-02-05 PROCEDURE — 84484 ASSAY OF TROPONIN QUANT: CPT

## 2023-02-05 PROCEDURE — 84100 ASSAY OF PHOSPHORUS: CPT

## 2023-02-05 PROCEDURE — 0225U NFCT DS DNA&RNA 21 SARSCOV2: CPT

## 2023-02-05 PROCEDURE — 82803 BLOOD GASES ANY COMBINATION: CPT

## 2023-02-05 PROCEDURE — 83605 ASSAY OF LACTIC ACID: CPT

## 2023-02-05 PROCEDURE — 80053 COMPREHEN METABOLIC PANEL: CPT

## 2023-02-05 PROCEDURE — 87086 URINE CULTURE/COLONY COUNT: CPT

## 2023-02-05 PROCEDURE — 81001 URINALYSIS AUTO W/SCOPE: CPT

## 2023-02-05 PROCEDURE — 99239 HOSP IP/OBS DSCHRG MGMT >30: CPT

## 2023-02-05 PROCEDURE — 83735 ASSAY OF MAGNESIUM: CPT

## 2023-02-05 PROCEDURE — 80048 BASIC METABOLIC PNL TOTAL CA: CPT

## 2023-02-05 PROCEDURE — 87077 CULTURE AEROBIC IDENTIFY: CPT

## 2023-02-05 PROCEDURE — 87186 SC STD MICRODIL/AGAR DIL: CPT

## 2023-02-05 PROCEDURE — 93005 ELECTROCARDIOGRAM TRACING: CPT

## 2023-02-05 PROCEDURE — 85027 COMPLETE CBC AUTOMATED: CPT

## 2023-02-05 PROCEDURE — 99232 SBSQ HOSP IP/OBS MODERATE 35: CPT | Mod: FS

## 2023-02-05 PROCEDURE — 85014 HEMATOCRIT: CPT

## 2023-02-05 PROCEDURE — 82435 ASSAY OF BLOOD CHLORIDE: CPT

## 2023-02-05 RX ORDER — FAMOTIDINE 10 MG/ML
1 INJECTION INTRAVENOUS
Qty: 0 | Refills: 0 | DISCHARGE

## 2023-02-05 RX ORDER — POTASSIUM CHLORIDE 20 MEQ
2 PACKET (EA) ORAL
Qty: 0 | Refills: 0 | DISCHARGE

## 2023-02-05 RX ORDER — POTASSIUM CHLORIDE 20 MEQ
1 PACKET (EA) ORAL
Qty: 0 | Refills: 0 | DISCHARGE

## 2023-02-05 RX ORDER — METOLAZONE 5 MG/1
1 TABLET ORAL
Qty: 0 | Refills: 0 | DISCHARGE

## 2023-02-05 RX ORDER — ALLOPURINOL 300 MG
1 TABLET ORAL
Qty: 0 | Refills: 0 | DISCHARGE

## 2023-02-05 RX ORDER — LEVOFLOXACIN 5 MG/ML
1 INJECTION, SOLUTION INTRAVENOUS
Qty: 7 | Refills: 0
Start: 2023-02-05 | End: 2023-02-11

## 2023-02-05 RX ADMIN — Medication 50 MILLIGRAM(S): at 12:21

## 2023-02-05 RX ADMIN — SENNA PLUS 2 TABLET(S): 8.6 TABLET ORAL at 12:21

## 2023-02-05 RX ADMIN — Medication 25 MILLIGRAM(S): at 07:21

## 2023-02-05 RX ADMIN — Medication 20 MILLIGRAM(S): at 06:06

## 2023-02-05 RX ADMIN — Medication 50 MICROGRAM(S): at 05:35

## 2023-02-05 RX ADMIN — SPIRONOLACTONE 25 MILLIGRAM(S): 25 TABLET, FILM COATED ORAL at 12:22

## 2023-02-05 RX ADMIN — Medication 100 MILLIGRAM(S): at 05:36

## 2023-02-05 RX ADMIN — PANTOPRAZOLE SODIUM 40 MILLIGRAM(S): 20 TABLET, DELAYED RELEASE ORAL at 05:45

## 2023-02-05 NOTE — PROGRESS NOTE ADULT - ASSESSMENT
CKD(IV): now with urinary retention (failed self catheterization)--> eubanks placed  Chills r/o infection  Gross hematuria  CHF  - cont to avoid potential nephrotoxins  - cont eubanks, await  evaluation  - cont Bumex--> expect and tolerate azotemia  - monitor labs    Anemia: +CKD  - no TAD needed now  - trend labs
77 year old wife wife Afib/ Flutter not on AC due to bleeding and fall risk , JEANMARIE ligation, MV repair, HFpEF EF ~ 45% severe PHTN,  severe AS s/p TAVR complained by SSS s/p PPM at [facility], CKD with neurogenic bladder which he self catheterizes, today presents with chills without fever and patient was unable to self catheterizes found to have urinary retentions TIERRA on CKD hypokalemia.    The patient symptoms improved with Eubanks placement.      shaking of hands- has since improved since coming to ED- not present at rest- coarse , mild when picking hands     Neurogenic bladder with possible CAUTI- UA positive- Rocephin follow culture     Difficulty self catheterizing - now with eubanks  now with eubanks and subsequently developed hematuria.   per urology recommends- keep eubanks, will monitor urine color and can remove when urine clears   - discussed with Dr. Leanne HDZ   constipation -secondary to Diphenoxylate atropine- starting miralax and senna   now with Eubanks- dc diphenoxylate atropine  called and discussed with Dr Perdomo urology- will evaluate    CKD- stable S creatinine 2.5, poor clearance of troponin and BNP    Afib- rate controlled - cont metoprolol  not on AC due to fall risk     CHFpEF with Severe Pul hypertension -with hypokalemia- NO clinical exacerbation   On Bumex and Metolazone cont - potassium supplement ed in ED  Sildenafil   Bumex + spironolactone (for Kerendia)  TTE post TAVR 12/2022 EF ~ 45% DDD LVH normal functioning TAVR MV repair with mild MR severer TR/PHN    appreciate cariology input     clinically no evidence of acute component     Gout -Allopurinol    Hypothyroidism Synthroid     GERD-PPI    DVT PPX- Lovenox   
CKD(IV): now with urinary retention (failed self catheterization)--> eubanks placed  Gross hematuria clearing  CHF  - cont to avoid potential nephrotoxins  - cont eubanks;  follow up  - cont Bumex-->  tolerate azotemia  - watch serum K+ on Aldactone  - monitor labs    Anemia: +CKD  - no TAD now  - trend labs

## 2023-02-05 NOTE — DISCHARGE NOTE NURSING/CASE MANAGEMENT/SOCIAL WORK - NSDCPEFALRISK_GEN_ALL_CORE
For information on Fall & Injury Prevention, visit: [email or web address] OR  [email or web address] OR  https://www.cdc.gov/steadi/patient.html

## 2023-02-05 NOTE — DISCHARGE NOTE PROVIDER - NSDCCPCAREPLAN_GEN_ALL_CORE_FT
PRINCIPAL DISCHARGE DIAGNOSIS  Diagnosis: Urine retention  Assessment and Plan of Treatment: Continue with the Jain catheter for now until seen by your urologist to resume intermittent catheterization.      SECONDARY DISCHARGE DIAGNOSES  Diagnosis: Acute UTI  Assessment and Plan of Treatment: Continue on the antibiotics.    Diagnosis: Pulmonary hypertension  Assessment and Plan of Treatment: Continue on sildenafil.     PRINCIPAL DISCHARGE DIAGNOSIS  Diagnosis: Urine retention  Assessment and Plan of Treatment: Continue with the Jain catheter for now until seen by your urologist to resume intermittent catheterization.  May change or replace catheter if leaking or dislodged.  May irrigate with 30-60 cc normal saline as needed for clogging or sediment.      SECONDARY DISCHARGE DIAGNOSES  Diagnosis: Acute UTI  Assessment and Plan of Treatment: Continue on the antibiotics.    Diagnosis: Pulmonary hypertension  Assessment and Plan of Treatment: Continue on sildenafil.

## 2023-02-05 NOTE — PROGRESS NOTE ADULT - SUBJECTIVE AND OBJECTIVE BOX
NEPHROLOGY INTERVAL HPI/OVERNIGHT EVENTS:  pt comfortable, seated in chair earlier today  urine clearing  no acute distress    MEDICATIONS  (STANDING):  allopurinol 100 milliGRAM(s) Oral two times a day  atorvastatin 20 milliGRAM(s) Oral at bedtime  buMETAnide 1 milliGRAM(s) Oral two times a day  cefTRIAXone Injectable. 1000 milliGRAM(s) IV Push every 24 hours  enoxaparin Injectable 40 milliGRAM(s) SubCutaneous every 24 hours  hydrALAZINE 50 milliGRAM(s) Oral two times a day  levothyroxine 50 MICROGram(s) Oral daily  metolazone 5 milliGRAM(s) Oral daily  metoprolol succinate ER 25 milliGRAM(s) Oral daily  pantoprazole    Tablet 40 milliGRAM(s) Oral before breakfast  senna 2 Tablet(s) Oral daily  sildenafil (REVATIO) 20 milliGRAM(s) Oral three times a day  spironolactone 25 milliGRAM(s) Oral daily    MEDICATIONS  (PRN):  polyethylene glycol 3350 17 Gram(s) Oral two times a day PRN Constipation      Allergies    No Known Allergies        Vital Signs Last 24 Hrs  T(C): 36.2 (05 Feb 2023 05:14), Max: 36.8 (04 Feb 2023 15:55)  T(F): 97.1 (05 Feb 2023 05:14), Max: 98.3 (04 Feb 2023 15:55)  HR: 89 (05 Feb 2023 05:35) (72 - 98)  BP: 102/58 (05 Feb 2023 05:35) (100/51 - 133/69)  BP(mean): --  RR: 17 (05 Feb 2023 05:14) (17 - 19)  SpO2: 95% (05 Feb 2023 05:14) (95% - 98%)    Parameters below as of 05 Feb 2023 05:14  Patient On (Oxygen Delivery Method): room air        PHYSICAL EXAM:  GENERAL: NAD  NECK: Supple, No JVD  NERVOUS SYSTEM:  Alert & Oriented X3, intact and symmetric  CHEST/LUNG: Clear bilaterally  HEART: Regular rate and rhythm; No rub  ABDOMEN: Soft, Nontender, +BS  EXTREMITIES:  2+ Peripheral Pulses, + tr dependent edema  SKIN: No rashes or lesions  : Jain    LABS:                        10.2   8.27  )-----------( 179      ( 05 Feb 2023 06:34 )             34.5     02-05    142  |  102  |  119.5<H>  ----------------------------<  112<H>  3.9   |  27.0  |  2.54<H>    Ca    8.2<L>      05 Feb 2023 06:34  Phos  4.4     02-05  Mg     2.2     02-05    TPro  5.6<L>  /  Alb  2.6<L>  /  TBili  0.4  /  DBili  x   /  AST  29  /  ALT  22  /  AlkPhos  122<H>  02-05        Magnesium, Serum: 2.2 mg/dL (02-05 @ 06:34)  Phosphorus Level, Serum: 4.4 mg/dL (02-05 @ 06:34)  Magnesium, Serum: 2.2 mg/dL (02-04 @ 21:45)  Phosphorus Level, Serum: 4.0 mg/dL (02-04 @ 21:45)      RADIOLOGY & ADDITIONAL TESTS:  < from: Xray Chest 1 View-PORTABLE IMMEDIATE (Xray Chest 1 View-PORTABLE IMMEDIATE .) (02.03.23 @ 13:44) >    ACC: [id number] EXAM:  XR CHEST PORTABLE IMMED 1V   ORDERED BY: GIRISH CAIN     PROCEDURE DATE:  02/03/2023          INTERPRETATION:  Portable chest radiograph    CLINICAL INFORMATION:   Short of breath.    TECHNIQUE:  Portable  AP view of the chest was obtained.    COMPARISON: 10/13/2022 chest x-ray available for review.    FINDINGS:  There is cardiomegaly, mild vascular congestion and perihilar   interstitial infiltrates without gross effusion. Constellation of   findings suggestive of CHF.  Status post cardiac valve replacement.  Cardiac device wire leads are within right atrium and right ventricle.    Trachea midline. No hilar mass.  Visualized osseous structures are intact.        IMPRESSION:   Constellation of findings suggestive of CHF..    < end of copied text >

## 2023-02-05 NOTE — DISCHARGE NOTE PROVIDER - NSDCFUSCHEDAPPT_GEN_ALL_CORE_FT
Aron Rodriguez  [facility]  [address]  Scheduled Appointment: 03/07/2023

## 2023-02-05 NOTE — DISCHARGE NOTE PROVIDER - HOSPITAL COURSE
77M with a history of atrial fibrillation, pulmonary hypertension, chronic kidney disease, neurogenic bladder with self catheterization, and hypothyroidism who presented after inability to pass the catheter for urine retention. A Jain cathter was inserted for urine retention and was noted to have hematuria. The Jain catheter remained in place and the hematuria resolved. The patient was started on antibiotics for a urinary tract infection and remained afebrile and without leukocytosis. The patient was advised to follow up with his urologist prior to removal of the Jain catheter and resumption of intermittent catheterization.        36 minutes total time

## 2023-02-05 NOTE — PROGRESS NOTE ADULT - SUBJECTIVE AND OBJECTIVE BOX
LETICIA MCCLURE  ----------------------------------------  The patient was seen at bedside. Patient with hematuria. Offered no complaints. Denied chest pain or abdominal pain.    Vital Signs Last 24 Hrs  T(C): 36.2 (05 Feb 2023 05:14), Max: 36.8 (04 Feb 2023 15:55)  T(F): 97.1 (05 Feb 2023 05:14), Max: 98.3 (04 Feb 2023 15:55)  HR: 89 (05 Feb 2023 05:35) (72 - 98)  BP: 102/58 (05 Feb 2023 05:35) (100/51 - 133/69)  BP(mean): --  RR: 17 (05 Feb 2023 05:14) (17 - 19)  SpO2: 95% (05 Feb 2023 05:14) (95% - 98%)    Parameters below as of 05 Feb 2023 05:14  Patient On (Oxygen Delivery Method): room air    PHYSICAL EXAMINATION:  ----------------------------------------  General appearance: No acute distress, Awake, Alert  HEENT: Normocephalic, Atraumatic, Conjunctiva clear, EOMI  Neck: Supple, No JVD, No tenderness  Lungs: Breath sound equal bilaterally, No wheezes, No rales  Cardiovascular: S1S2, Regular rhythm  Abdomen: Soft, Nontender, Nondistended, No guarding/rebound, Positive bowel sounds  Extremities: No clubbing, No cyanosis, No edema, No calf tenderness  Neuro: Strength equal bilaterally, No tremors  Psychiatric: Appropriate mood, Normal affect    LABORATORY STUDIES:  ----------------------------------------             10.2   8.27  )-----------( 179      ( 05 Feb 2023 06:34 )             34.5     02-05    142  |  102  |  119.5<H>  ----------------------------<  112<H>  3.9   |  27.0  |  2.54<H>    Ca    8.2<L>      05 Feb 2023 06:34  Phos  4.4     02-05  Mg     2.2     02-05    TPro  5.6<L>  /  Alb  2.6<L>  /  TBili  0.4  /  DBili  x   /  AST  29  /  ALT  22  /  AlkPhos  122<H>  02-05    LIVER FUNCTIONS - ( 05 Feb 2023 06:34 )  Alb: 2.6 g/dL / Pro: 5.6 g/dL / ALK PHOS: 122 U/L / ALT: 22 U/L / AST: 29 U/L / GGT: x           Culture - Blood (collected 03 Feb 2023 15:50)  Source: .Blood Blood  Preliminary Report (04 Feb 2023 22:02):    No growth to date.    Culture - Urine (collected 03 Feb 2023 09:45)  Source: Clean Catch Clean Catch (Midstream)  Preliminary Report (04 Feb 2023 21:40):    >100,000 CFU/ml Enterobacter cloacae complex    MEDICATIONS  (STANDING):  allopurinol 100 milliGRAM(s) Oral two times a day  atorvastatin 20 milliGRAM(s) Oral at bedtime  buMETAnide 1 milliGRAM(s) Oral two times a day  cefTRIAXone Injectable. 1000 milliGRAM(s) IV Push every 24 hours  enoxaparin Injectable 40 milliGRAM(s) SubCutaneous every 24 hours  hydrALAZINE 50 milliGRAM(s) Oral two times a day  levothyroxine 50 MICROGram(s) Oral daily  metolazone 5 milliGRAM(s) Oral daily  metoprolol succinate ER 25 milliGRAM(s) Oral daily  pantoprazole    Tablet 40 milliGRAM(s) Oral before breakfast  senna 2 Tablet(s) Oral daily  sildenafil (REVATIO) 20 milliGRAM(s) Oral three times a day  spironolactone 25 milliGRAM(s) Oral daily    MEDICATIONS  (PRN):  polyethylene glycol 3350 17 Gram(s) Oral two times a day PRN Constipation      ASSESSMENT / PLAN:  ----------------------------------------  77M with a history of atrial fibrillation, pulmonary hypertension, chronic kidney disease, neurogenic bladder with self catheterization, and hypothyroidism who presented after inability to pass the catheter for urine retention. A Jain cathter was inserted for urine retention and was noted to have hematuria.    Urine retention / Hematuria - Jain catheter in place. Monitoring output. Hematuria appears resolved. Urology consultation noted.    Urinary tract infection - The patient is without dysuria. Remains afebrile and without leukocytosis. Urine culture results pending.    Atrial fibrillation - On metoprolol for rate control. The patient is reported to have a history of left atrial appendage ligation in the past.    Pulmonary hypertension - On sildenafil. No hypoxia on examination.    Chronic kidney disease with acute kidney injury - Renal function remains stable.    Hypothyroidism - On levothyroxine.    Discussed with the patient and his daughter in law at the bedside. LETICIA MCCLURE  ----------------------------------------  The patient was seen at bedside. Patient with hematuria. Offered no complaints. Denied chest pain or abdominal pain.    Vital Signs Last 24 Hrs  T(C): 36.2 (05 Feb 2023 05:14), Max: 36.8 (04 Feb 2023 15:55)  T(F): 97.1 (05 Feb 2023 05:14), Max: 98.3 (04 Feb 2023 15:55)  HR: 89 (05 Feb 2023 05:35) (72 - 98)  BP: 102/58 (05 Feb 2023 05:35) (100/51 - 133/69)  BP(mean): --  RR: 17 (05 Feb 2023 05:14) (17 - 19)  SpO2: 95% (05 Feb 2023 05:14) (95% - 98%)    Parameters below as of 05 Feb 2023 05:14  Patient On (Oxygen Delivery Method): room air    PHYSICAL EXAMINATION:  ----------------------------------------  General appearance: No acute distress, Awake, Alert  HEENT: Normocephalic, Atraumatic, Conjunctiva clear, EOMI  Neck: Supple, No JVD, No tenderness  Lungs: Breath sound equal bilaterally, No wheezes, No rales  Cardiovascular: S1S2, Regular rhythm  Abdomen: Soft, Nontender, Nondistended, No guarding/rebound, Positive bowel sounds  Extremities: No clubbing, No cyanosis, No edema, No calf tenderness  Neuro: Strength equal bilaterally, No tremors  Psychiatric: Appropriate mood, Normal affect    LABORATORY STUDIES:  ----------------------------------------             10.2   8.27  )-----------( 179      ( 05 Feb 2023 06:34 )             34.5     02-05    142  |  102  |  119.5<H>  ----------------------------<  112<H>  3.9   |  27.0  |  2.54<H>    Ca    8.2<L>      05 Feb 2023 06:34  Phos  4.4     02-05  Mg     2.2     02-05    TPro  5.6<L>  /  Alb  2.6<L>  /  TBili  0.4  /  DBili  x   /  AST  29  /  ALT  22  /  AlkPhos  122<H>  02-05    LIVER FUNCTIONS - ( 05 Feb 2023 06:34 )  Alb: 2.6 g/dL / Pro: 5.6 g/dL / ALK PHOS: 122 U/L / ALT: 22 U/L / AST: 29 U/L / GGT: x           Culture - Blood (collected 03 Feb 2023 15:50)  Source: .Blood Blood  Preliminary Report (04 Feb 2023 22:02):    No growth to date.    Culture - Urine (collected 03 Feb 2023 09:45)  Source: Clean Catch Clean Catch (Midstream)  Preliminary Report (04 Feb 2023 21:40):    >100,000 CFU/ml Enterobacter cloacae complex    MEDICATIONS  (STANDING):  allopurinol 100 milliGRAM(s) Oral two times a day  atorvastatin 20 milliGRAM(s) Oral at bedtime  buMETAnide 1 milliGRAM(s) Oral two times a day  cefTRIAXone Injectable. 1000 milliGRAM(s) IV Push every 24 hours  enoxaparin Injectable 40 milliGRAM(s) SubCutaneous every 24 hours  hydrALAZINE 50 milliGRAM(s) Oral two times a day  levothyroxine 50 MICROGram(s) Oral daily  metolazone 5 milliGRAM(s) Oral daily  metoprolol succinate ER 25 milliGRAM(s) Oral daily  pantoprazole    Tablet 40 milliGRAM(s) Oral before breakfast  senna 2 Tablet(s) Oral daily  sildenafil (REVATIO) 20 milliGRAM(s) Oral three times a day  spironolactone 25 milliGRAM(s) Oral daily    MEDICATIONS  (PRN):  polyethylene glycol 3350 17 Gram(s) Oral two times a day PRN Constipation      ASSESSMENT / PLAN:  ----------------------------------------  77M with a history of atrial fibrillation, pulmonary hypertension, chronic kidney disease, neurogenic bladder with self catheterization, and hypothyroidism who presented after inability to pass the catheter for urine retention. A Jain cathter was inserted for urine retention and was noted to have hematuria.    Urine retention / Hematuria - Jain catheter in place. Monitoring output. Hematuria appears resolved. Hemoglobin level without significant change. Urology consultation noted.    Urinary tract infection - The patient is without dysuria. Remains afebrile and without leukocytosis. Urine culture results pending.    Atrial fibrillation - On metoprolol for rate control. The patient is reported to have a history of left atrial appendage ligation in the past.    Pulmonary hypertension - On sildenafil. No hypoxia on examination.    Chronic kidney disease with acute kidney injury - Renal function remains stable.    Hypothyroidism - On levothyroxine.    Discussed with the patient and his daughter in law at the bedside.

## 2023-02-05 NOTE — DISCHARGE NOTE PROVIDER - NSDCMRMEDTOKEN_GEN_ALL_CORE_FT
allopurinol 100 mg oral tablet: 1 tab(s) orally once a day  bumetanide 1 mg oral tablet: 1 tab(s) orally 2 times a day   hydrALAZINE 50 mg oral tablet: 1 tab(s) orally 2 times a day  Kerendia 10 mg oral tablet: 1 tab(s) orally every 48 hours  levoFLOXacin 500 mg oral tablet: 1 tab(s) orally every 24 hours   levothyroxine 50 mcg (0.05 mg) oral tablet: 1 tab(s) orally once a day  Lipitor 20 mg oral tablet: 1 tab(s) orally once a day  metOLazone 5 mg oral tablet: 1 tab(s) orally 2 times a day  metoprolol tartrate 25 mg oral tablet: 1 tab(s) orally once a day  potassium chloride 10 mEq oral capsule, extended release: 1 cap(s) orally 2 times a day  Protonix 40 mg oral delayed release tablet: 1 tab(s) orally once a day  sildenafil 20 mg oral tablet: 1 tab(s) orally 3 times a day

## 2023-02-05 NOTE — DISCHARGE NOTE NURSING/CASE MANAGEMENT/SOCIAL WORK - PATIENT PORTAL LINK FT
You can access the FollowMyHealth Patient Portal offered by [facility] by registering at the following website: [email or web address]. By joining [facility]’s FollowMyHealth portal, you will also be able to view your health information using other applications (apps) compatible with our system.

## 2023-02-05 NOTE — PROGRESS NOTE ADULT - SUBJECTIVE AND OBJECTIVE BOX
[facility]gery Pa follow up  brief urology note    patient seen and examined earlier this AM  No acute events prior  labs/vitals reviewed  Clear urine, eubanks remained in place    -- No current need for ongoing urology following  - Patient cleared to resume self catheterizing once UTI resolves   - May follow up with Dr. Cook or with prior urologist on discharge

## 2023-02-05 NOTE — PROVIDER CONTACT NOTE (MEDICATION) - ACTION/TREATMENT ORDERED:
per pa give revatio and metoprolol er. push back zaroxylyn  hydralazine aldactone. hold bumex per parameter

## 2023-02-06 LAB
-  AMIKACIN: SIGNIFICANT CHANGE UP
-  AMOXICILLIN/CLAVULANIC ACID: SIGNIFICANT CHANGE UP
-  AMPICILLIN/SULBACTAM: SIGNIFICANT CHANGE UP
-  AMPICILLIN: SIGNIFICANT CHANGE UP
-  AZTREONAM: SIGNIFICANT CHANGE UP
-  CEFAZOLIN: SIGNIFICANT CHANGE UP
-  CEFEPIME: SIGNIFICANT CHANGE UP
-  CEFOXITIN: SIGNIFICANT CHANGE UP
-  CEFTRIAXONE: SIGNIFICANT CHANGE UP
-  CIPROFLOXACIN: SIGNIFICANT CHANGE UP
-  ERTAPENEM: SIGNIFICANT CHANGE UP
-  GENTAMICIN: SIGNIFICANT CHANGE UP
-  IMIPENEM: SIGNIFICANT CHANGE UP
-  LEVOFLOXACIN: SIGNIFICANT CHANGE UP
-  MEROPENEM: SIGNIFICANT CHANGE UP
-  NITROFURANTOIN: SIGNIFICANT CHANGE UP
-  PIPERACILLIN/TAZOBACTAM: SIGNIFICANT CHANGE UP
-  TOBRAMYCIN: SIGNIFICANT CHANGE UP
-  TRIMETHOPRIM/SULFAMETHOXAZOLE: SIGNIFICANT CHANGE UP
CULTURE RESULTS: SIGNIFICANT CHANGE UP
METHOD TYPE: SIGNIFICANT CHANGE UP
ORGANISM # SPEC MICROSCOPIC CNT: SIGNIFICANT CHANGE UP
ORGANISM # SPEC MICROSCOPIC CNT: SIGNIFICANT CHANGE UP
SPECIMEN SOURCE: SIGNIFICANT CHANGE UP

## 2023-02-06 NOTE — CDI QUERY NOTE - NSCDIOTHERTXTBX_GEN_ALL_CORE_HH
SUPPORTING DOCUMENTATION / EVIDENCE:  Patient with history of neurogenic bladder admitted with urinary retention due to patient unable to self catheterize  Found to have UTI and ARF, eubanks cath placed in ED  Possible CAUTI  documented in H&P, chart note and attending 2/4 note  DC summary = urine retention and acute UTI    H&P:  History of Present Illness:  CKD with neurogenic bladder which he self catheterizes, today presents with chills without fever and patient was unable to self catheterizes found to have urinary retentions TIERRA on CKD hypokalemia.      Neurogenic bladder with possible CAUTI- UA positive- Rocephin follow culture     Difficulty self catheterizing -   probably along with constipation -secondary to Diphenoxylate atropine  now with Eubanks- dc diphenoxylate atropine  called and discussed with Dr Perdomo urology- will evaluate    CHART NOTE 2/3 NP:  Patient admitted with Neurogenic bladder with possible CAUTI, also with difficulty self catheterizing  Eubanks placed this AM, called by RN patient with hematuria seen in Eubanks bag   Likely 2/2 to traumatic Eubanks placement    ATTENDING 2/4 NOTE:  Neurogenic bladder with possible CAUTI- UA positive- Rocephin follow culture     URINE CULTURE:  Culture - Urine (02.04.23 @ 19:10)    Specimen Source: Catheterized Catheterized    Culture Results:   >100,000 CFU/ml Enterobacter cloacae complex      Please clarify, in addendum to DC summary   - UTI 2/2 to self catheterizations, present on admission   - CAUTI present on admission 2/2 self catheterizations   - Other   - Not clinically significant    Thank you

## 2023-02-08 ENCOUNTER — OUTPATIENT (OUTPATIENT)
Dept: OUTPATIENT SERVICES | Facility: HOSPITAL | Age: 78
LOS: 1 days | End: 2023-02-08
Payer: MEDICARE

## 2023-02-08 ENCOUNTER — APPOINTMENT (OUTPATIENT)
Dept: UROLOGY | Facility: CLINIC | Age: 78
End: 2023-02-08
Payer: MEDICARE

## 2023-02-08 DIAGNOSIS — Z90.49 ACQUIRED ABSENCE OF OTHER SPECIFIED PARTS OF DIGESTIVE TRACT: Chronic | ICD-10-CM

## 2023-02-08 DIAGNOSIS — Z98.890 OTHER SPECIFIED POSTPROCEDURAL STATES: Chronic | ICD-10-CM

## 2023-02-08 DIAGNOSIS — Z95.2 PRESENCE OF PROSTHETIC HEART VALVE: Chronic | ICD-10-CM

## 2023-02-08 LAB
CULTURE RESULTS: SIGNIFICANT CHANGE UP
SPECIMEN SOURCE: SIGNIFICANT CHANGE UP

## 2023-02-08 PROCEDURE — 52281 CYSTOSCOPY AND TREATMENT: CPT

## 2023-02-08 PROCEDURE — 99214 OFFICE O/P EST MOD 30 MIN: CPT | Mod: 25

## 2023-02-08 RX ORDER — AMOXICILLIN AND CLAVULANATE POTASSIUM 500; 125 MG/1; MG/1
500-125 TABLET, FILM COATED ORAL TWICE DAILY
Qty: 14 | Refills: 0 | Status: COMPLETED | COMMUNITY
Start: 2023-02-08 | End: 2023-02-20

## 2023-02-08 NOTE — PHYSICAL EXAM
[General Appearance - Well Developed] : well developed [General Appearance - Well Nourished] : well nourished [Normal Appearance] : normal appearance [Well Groomed] : well groomed [General Appearance - In No Acute Distress] : no acute distress [Abdomen Soft] : soft [Abdomen Tenderness] : non-tender [Costovertebral Angle Tenderness] : no ~M costovertebral angle tenderness [Urethral Meatus] : meatus normal [Penis Abnormality] : normal uncircumcised penis [Urinary Bladder Findings] : the bladder was normal on palpation [Testes Tenderness] : no tenderness of the testes [Testes Mass (___cm)] : there were no testicular masses

## 2023-02-09 NOTE — ASSESSMENT
[FreeTextEntry1] : Bladder filled, catheter removed. However, the patient was unable to self catheterize.\par Attempted coude tip catheter but again unable to enter the bladder.\par \par Cystoscopy performed which showed bladder neck contracture.\par Jain catheter placed over the wire.\par Recommend to proceed with outpatient surgery for cystoscopy, incision of bladder neck contracture.\par Risks and benefits reviewed.\par Will need medical clearance prior to surgery.

## 2023-02-09 NOTE — HISTORY OF PRESENT ILLNESS
[FreeTextEntry1] : Presents to the office for evaluation.\par H/o TURP 2018.\par Urinary retention was on CIC.\par Last week episode of inability to catheterize, presented to the ER. Jain catheter placed with some difficulty and admitted for IV abx.  \par \par Returns today for catheter removal.  \par Urine remains clear.

## 2023-02-11 ENCOUNTER — TRANSCRIPTION ENCOUNTER (OUTPATIENT)
Age: 78
End: 2023-02-11

## 2023-02-13 ENCOUNTER — TRANSCRIPTION ENCOUNTER (OUTPATIENT)
Age: 78
End: 2023-02-13

## 2023-03-01 DIAGNOSIS — R33.9 RETENTION OF URINE, UNSPECIFIED: ICD-10-CM

## 2023-03-01 DIAGNOSIS — N32.0 BLADDER-NECK OBSTRUCTION: ICD-10-CM

## 2023-03-07 ENCOUNTER — APPOINTMENT (OUTPATIENT)
Dept: GASTROENTEROLOGY | Facility: CLINIC | Age: 78
End: 2023-03-07
Payer: MEDICARE

## 2023-03-07 VITALS
HEIGHT: 71 IN | SYSTOLIC BLOOD PRESSURE: 110 MMHG | WEIGHT: 204 LBS | BODY MASS INDEX: 28.56 KG/M2 | HEART RATE: 70 BPM | DIASTOLIC BLOOD PRESSURE: 60 MMHG | RESPIRATION RATE: 16 BRPM | OXYGEN SATURATION: 98 %

## 2023-03-07 DIAGNOSIS — K74.60 UNSPECIFIED CIRRHOSIS OF LIVER: ICD-10-CM

## 2023-03-07 PROCEDURE — 99214 OFFICE O/P EST MOD 30 MIN: CPT

## 2023-03-07 RX ORDER — CARBOXYMETHYLCELLULOSE SODIUM 0.5 %
250 DROPPERETTE, SINGLE-USE DROP DISPENSER OPHTHALMIC (EYE)
Qty: 180 | Refills: 0 | Status: DISCONTINUED | COMMUNITY
Start: 2022-03-03 | End: 2023-03-07

## 2023-03-07 RX ORDER — MENTHOL/CAMPHOR 0.5 %-0.5%
1000 LOTION (ML) TOPICAL
Refills: 0 | Status: DISCONTINUED | COMMUNITY
End: 2023-03-07

## 2023-03-07 NOTE — HISTORY OF PRESENT ILLNESS
[de-identified] : 6/21 EGD with biopsy.  No varices.  Muse's esophagus without dysplasia.\par 9/20/2022.  EGD gastric nodule hyperplastic on biopsy.  Small.  Completely removed.  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  No H. pylori. [FreeTextEntry1] : Colonoscopy 5/18 negative.  Positive family history of colon cancer.

## 2023-03-07 NOTE — ASSESSMENT
[FreeTextEntry1] : Chronic  alcoholic cirrhosis with trace ascites.  No varices.  No PHG.  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  Modest thrombocytopenia episodically.  Minor coagulopathy.  Advanced renal insufficiency with obstructive uropathy.\par History of TAVR.  History of mitral valve repair.  History of permanent plate pacemaker in place since 11/22.  No AICD.\par \par Given degree of liver disease, Yair A cirrhosis, patient is at moderate risk for decompensation of underlying liver disease from noncardiac surgery.  Patient is clearable from a GI point of view.  No history of portal hypertension or esophageal or gastric varices.  Maintain adequate hydration throughout surgical procedure.  Minimize salt intake.  Mobilize as quickly as possible.\par Patient is in as optimal medical condition as possible given state of chronic underlying conditions.\par \par Call for problems.  Repeat blood work and abdominal sonogram in 4 months.

## 2023-03-07 NOTE — PHYSICAL EXAM
[Alert] : alert [Normal Voice/Communication] : normal voice/communication [Healthy Appearing] : healthy appearing [No Acute Distress] : no acute distress [Sclera] : the sclera and conjunctiva were normal [Hearing Threshold Finger Rub Not [place]] : hearing was normal [Normal Lips/Gums] : the lips and gums were normal [Oropharynx] : the oropharynx was normal [Normal Appearance] : the appearance of the neck was normal [No Neck Mass] : no neck mass was observed [No Respiratory Distress] : no respiratory distress [No Acc Muscle Use] : no accessory muscle use [Respiration, Rhythm And Depth] : normal respiratory rhythm and effort [Auscultation Breath Sounds / Voice Sounds] : lungs were clear to auscultation bilaterally [Heart Rate And Rhythm] : heart rate was normal and rhythm regular [Normal S1, S2] : normal S1 and S2 [Murmurs] : no murmurs [+2] : edema: +2 [Bowel Sounds] : normal bowel sounds [Abdomen Tenderness] : non-tender [No Masses] : no abdominal mass palpated [Abdomen Soft] : soft [] : no hepatosplenomegaly [Oriented To Time, Place, And Person] : oriented to person, place, and time [de-identified] : Fairly robust in appearance.  Appears stated age. [de-identified] : Has chronic indwelling Jain and leg bag.  Brawny edema of the lower extremities to mid calf.  Chronic dermatitis. [de-identified] : Obese, bowel sounds present.  No organomegaly.  No mass tenderness or rebound.  No distention or lymphadenopathy.  No hernias [de-identified] : Deferred. [de-identified] : Intact. [de-identified] : Normal.

## 2023-03-07 NOTE — REASON FOR VISIT
[Follow-up] : a follow-up of an existing diagnosis [FreeTextEntry1] : Alcoholic cirrhosis with Muse's esophagus.  Family history of colon cancer.

## 2023-03-13 ENCOUNTER — OUTPATIENT (OUTPATIENT)
Dept: OUTPATIENT SERVICES | Facility: HOSPITAL | Age: 78
LOS: 1 days | End: 2023-03-13
Payer: MEDICARE

## 2023-03-13 VITALS
TEMPERATURE: 98 F | DIASTOLIC BLOOD PRESSURE: 52 MMHG | SYSTOLIC BLOOD PRESSURE: 112 MMHG | WEIGHT: 205.03 LBS | HEIGHT: 69 IN | RESPIRATION RATE: 16 BRPM | OXYGEN SATURATION: 97 % | HEART RATE: 95 BPM

## 2023-03-13 DIAGNOSIS — G47.33 OBSTRUCTIVE SLEEP APNEA (ADULT) (PEDIATRIC): ICD-10-CM

## 2023-03-13 DIAGNOSIS — Z98.890 OTHER SPECIFIED POSTPROCEDURAL STATES: Chronic | ICD-10-CM

## 2023-03-13 DIAGNOSIS — N18.9 CHRONIC KIDNEY DISEASE, UNSPECIFIED: ICD-10-CM

## 2023-03-13 DIAGNOSIS — N32.0 BLADDER-NECK OBSTRUCTION: ICD-10-CM

## 2023-03-13 DIAGNOSIS — I10 ESSENTIAL (PRIMARY) HYPERTENSION: ICD-10-CM

## 2023-03-13 DIAGNOSIS — Z95.0 PRESENCE OF CARDIAC PACEMAKER: Chronic | ICD-10-CM

## 2023-03-13 DIAGNOSIS — Z90.49 ACQUIRED ABSENCE OF OTHER SPECIFIED PARTS OF DIGESTIVE TRACT: Chronic | ICD-10-CM

## 2023-03-13 DIAGNOSIS — Z95.2 PRESENCE OF PROSTHETIC HEART VALVE: Chronic | ICD-10-CM

## 2023-03-13 DIAGNOSIS — I27.20 PULMONARY HYPERTENSION, UNSPECIFIED: ICD-10-CM

## 2023-03-13 LAB
ALBUMIN SERPL ELPH-MCNC: 3.1 G/DL — LOW (ref 3.3–5)
ALP SERPL-CCNC: 76 U/L — SIGNIFICANT CHANGE UP (ref 40–120)
ALT FLD-CCNC: 18 U/L — SIGNIFICANT CHANGE UP (ref 4–41)
ANION GAP SERPL CALC-SCNC: 14 MMOL/L — SIGNIFICANT CHANGE UP (ref 7–14)
APTT BLD: 31.5 SEC — SIGNIFICANT CHANGE UP (ref 27–36.3)
AST SERPL-CCNC: 22 U/L — SIGNIFICANT CHANGE UP (ref 4–40)
BILIRUB SERPL-MCNC: 0.5 MG/DL — SIGNIFICANT CHANGE UP (ref 0.2–1.2)
BUN SERPL-MCNC: 113 MG/DL — HIGH (ref 7–23)
CALCIUM SERPL-MCNC: 8.5 MG/DL — SIGNIFICANT CHANGE UP (ref 8.4–10.5)
CHLORIDE SERPL-SCNC: 102 MMOL/L — SIGNIFICANT CHANGE UP (ref 98–107)
CO2 SERPL-SCNC: 23 MMOL/L — SIGNIFICANT CHANGE UP (ref 22–31)
CREAT SERPL-MCNC: 2.14 MG/DL — HIGH (ref 0.5–1.3)
EGFR: 31 ML/MIN/1.73M2 — LOW
GLUCOSE SERPL-MCNC: 98 MG/DL — SIGNIFICANT CHANGE UP (ref 70–99)
HCT VFR BLD CALC: 34.1 % — LOW (ref 39–50)
HGB BLD-MCNC: 10.4 G/DL — LOW (ref 13–17)
INR BLD: 1.2 RATIO — HIGH (ref 0.88–1.16)
MCHC RBC-ENTMCNC: 28.6 PG — SIGNIFICANT CHANGE UP (ref 27–34)
MCHC RBC-ENTMCNC: 30.5 GM/DL — LOW (ref 32–36)
MCV RBC AUTO: 93.7 FL — SIGNIFICANT CHANGE UP (ref 80–100)
NRBC # BLD: 0 /100 WBCS — SIGNIFICANT CHANGE UP (ref 0–0)
NRBC # FLD: 0 K/UL — SIGNIFICANT CHANGE UP (ref 0–0)
PLATELET # BLD AUTO: 106 K/UL — LOW (ref 150–400)
POTASSIUM SERPL-MCNC: 3.8 MMOL/L — SIGNIFICANT CHANGE UP (ref 3.5–5.3)
POTASSIUM SERPL-SCNC: 3.8 MMOL/L — SIGNIFICANT CHANGE UP (ref 3.5–5.3)
PROT SERPL-MCNC: 6.2 G/DL — SIGNIFICANT CHANGE UP (ref 6–8.3)
PROTHROM AB SERPL-ACNC: 14 SEC — HIGH (ref 10.5–13.4)
RBC # BLD: 3.64 M/UL — LOW (ref 4.2–5.8)
RBC # FLD: 21.2 % — HIGH (ref 10.3–14.5)
SODIUM SERPL-SCNC: 139 MMOL/L — SIGNIFICANT CHANGE UP (ref 135–145)
WBC # BLD: 8.24 K/UL — SIGNIFICANT CHANGE UP (ref 3.8–10.5)
WBC # FLD AUTO: 8.24 K/UL — SIGNIFICANT CHANGE UP (ref 3.8–10.5)

## 2023-03-13 PROCEDURE — 93010 ELECTROCARDIOGRAM REPORT: CPT

## 2023-03-13 RX ORDER — FINERENONE 20 MG/1
1 TABLET, FILM COATED ORAL
Qty: 0 | Refills: 0 | DISCHARGE

## 2023-03-13 RX ORDER — METOPROLOL TARTRATE 50 MG
1 TABLET ORAL
Qty: 0 | Refills: 0 | DISCHARGE

## 2023-03-13 NOTE — H&P PST ADULT - GENITOURINARY COMMENTS
not examined preop dx. bladder-neck obstruction, eubanks catheter in place preop dx. bladder-neck obstruction, eubanks catheter in place with clear yellow urine

## 2023-03-13 NOTE — H&P PST ADULT - NSICDXPASTSURGICALHX_GEN_ALL_CORE_FT
PAST SURGICAL HISTORY:  H/O hernia repair     History of cholecystectomy     History of mitral valve repair     Pacemaker     S/P TAVR (transcatheter aortic valve replacement)

## 2023-03-13 NOTE — H&P PST ADULT - PROBLEM SELECTOR PLAN 4
Pt instructed to take kerendia AM of surgery with a sip of water, pt able to verbalize understanding.

## 2023-03-13 NOTE — H&P PST ADULT - NS MD HP INPLANTS MED DEV
pacemaker, loop recorder pacemaker [employer] Model [id number] Serial [id number], loop recorder Metronic LNQ11 Serial [id number]

## 2023-03-13 NOTE — H&P PST ADULT - PROBLEM SELECTOR PLAN 1
Pt is scheduled for cystoscopy, laser incision of prostate/bladder neck on 3/22/23.  Verbal and written pre op instructions reviewed with patient and pt able to verbalize understanding.  Pacemaker information sent to OR booking.  Case discussed with Dr. Catherine Pt is scheduled for cystoscopy, laser incision of prostate/bladder neck on 3/22/23.  Verbal and written pre op instructions reviewed with patient and pt able to verbalize understanding.  Pacemaker information sent to OR booking.  Case discussed with Dr. Shaw, per Dr. Shaw, obtain last cardiac reports and will reevaluate if case can be done at [facility].  Pt to obtain cardiac eval as requested by surgeon, will request document. Pt is scheduled for cystoscopy, laser incision of prostate/bladder neck on 3/22/23.  Verbal and written pre op instructions reviewed with patient and pt able to verbalize understanding.  Pacemaker information sent to OR booking.  Case discussed with Dr. Welch, per Dr. Welch, case to be moved to the main OR, Dr. Cade notified via email.   Pt to obtain cardiac eval as requested by surgeon, will request document.

## 2023-03-13 NOTE — H&P PST ADULT - PROBLEM SELECTOR PLAN 3
Pt instructed to take Bumetonide AM of surgery with a sip of water, pt able to verbalize understanding.

## 2023-03-13 NOTE — H&P PST ADULT - HISTORY OF PRESENT ILLNESS
78 yo male with preop dx. bladder-neck obstruction presents to pre surgical testing.  Pt with h/o urinary retention and intermittent self catheterization.  Pt reports inability to pass catheter to bladder and eubanks was placed.  Pt is scheduled for cystoscopy, laser incision of prostate/bladder neck.

## 2023-03-13 NOTE — H&P PST ADULT - CARDIOVASCULAR COMMENTS
s/p Pacemaker placement, bovine aortic valve replacement, mitral valve repair 11/2022 hyperpigmentation b/l LE

## 2023-03-13 NOTE — H&P PST ADULT - PROBLEM SELECTOR PLAN 2
Pt instructed to take metoprolol and hydralazine AM of surgery with a sip of water, pt able to verbalize understanding.

## 2023-03-15 ENCOUNTER — NON-APPOINTMENT (OUTPATIENT)
Age: 78
End: 2023-03-15

## 2023-03-15 PROBLEM — Z87.19 PERSONAL HISTORY OF OTHER DISEASES OF THE DIGESTIVE SYSTEM: Chronic | Status: ACTIVE | Noted: 2023-03-13

## 2023-03-15 PROBLEM — G47.33 OBSTRUCTIVE SLEEP APNEA (ADULT) (PEDIATRIC): Chronic | Status: ACTIVE | Noted: 2023-03-13

## 2023-03-15 PROBLEM — E03.9 HYPOTHYROIDISM, UNSPECIFIED: Chronic | Status: ACTIVE | Noted: 2023-03-13

## 2023-03-15 PROBLEM — N18.9 CHRONIC KIDNEY DISEASE, UNSPECIFIED: Chronic | Status: ACTIVE | Noted: 2023-03-13

## 2023-03-15 PROBLEM — I48.91 UNSPECIFIED ATRIAL FIBRILLATION: Chronic | Status: ACTIVE | Noted: 2023-03-13

## 2023-03-15 PROBLEM — I27.20 PULMONARY HYPERTENSION, UNSPECIFIED: Chronic | Status: ACTIVE | Noted: 2023-03-13

## 2023-03-15 PROBLEM — N32.0 BLADDER-NECK OBSTRUCTION: Chronic | Status: ACTIVE | Noted: 2023-03-13

## 2023-03-15 LAB

## 2023-03-15 RX ORDER — LINEZOLID 600 MG/1
600 TABLET, FILM COATED ORAL
Qty: 10 | Refills: 0 | Status: COMPLETED | COMMUNITY
Start: 2023-03-15 | End: 2023-03-27

## 2023-03-22 ENCOUNTER — APPOINTMENT (OUTPATIENT)
Dept: UROLOGY | Facility: AMBULATORY SURGERY CENTER | Age: 78
End: 2023-03-22

## 2023-03-24 ENCOUNTER — TRANSCRIPTION ENCOUNTER (OUTPATIENT)
Age: 78
End: 2023-03-24

## 2023-03-24 ENCOUNTER — APPOINTMENT (OUTPATIENT)
Dept: UROLOGY | Facility: HOSPITAL | Age: 78
End: 2023-03-24

## 2023-03-24 ENCOUNTER — OUTPATIENT (OUTPATIENT)
Dept: OUTPATIENT SERVICES | Facility: HOSPITAL | Age: 78
LOS: 1 days | Discharge: HOME | End: 2023-03-24
Payer: MEDICARE

## 2023-03-24 VITALS
DIASTOLIC BLOOD PRESSURE: 49 MMHG | OXYGEN SATURATION: 97 % | SYSTOLIC BLOOD PRESSURE: 98 MMHG | HEART RATE: 96 BPM | RESPIRATION RATE: 20 BRPM

## 2023-03-24 VITALS
HEART RATE: 82 BPM | HEIGHT: 69 IN | TEMPERATURE: 97 F | OXYGEN SATURATION: 95 % | RESPIRATION RATE: 18 BRPM | DIASTOLIC BLOOD PRESSURE: 52 MMHG | SYSTOLIC BLOOD PRESSURE: 109 MMHG | WEIGHT: 205.03 LBS

## 2023-03-24 DIAGNOSIS — Z98.890 OTHER SPECIFIED POSTPROCEDURAL STATES: Chronic | ICD-10-CM

## 2023-03-24 DIAGNOSIS — N32.0 BLADDER-NECK OBSTRUCTION: ICD-10-CM

## 2023-03-24 DIAGNOSIS — Z95.2 PRESENCE OF PROSTHETIC HEART VALVE: Chronic | ICD-10-CM

## 2023-03-24 DIAGNOSIS — Z90.49 ACQUIRED ABSENCE OF OTHER SPECIFIED PARTS OF DIGESTIVE TRACT: Chronic | ICD-10-CM

## 2023-03-24 DIAGNOSIS — Z95.0 PRESENCE OF CARDIAC PACEMAKER: Chronic | ICD-10-CM

## 2023-03-24 PROCEDURE — 52640 RELIEVE BLADDER CONTRACTURE: CPT

## 2023-03-24 DEVICE — LASER FIBER FLEXIVA 365 ID: Type: IMPLANTABLE DEVICE | Status: FUNCTIONAL

## 2023-03-24 RX ORDER — ONDANSETRON 8 MG/1
4 TABLET, FILM COATED ORAL ONCE
Refills: 0 | Status: DISCONTINUED | OUTPATIENT
Start: 2023-03-24 | End: 2023-04-07

## 2023-03-24 RX ORDER — HYDROMORPHONE HYDROCHLORIDE 2 MG/ML
0.5 INJECTION INTRAMUSCULAR; INTRAVENOUS; SUBCUTANEOUS
Refills: 0 | Status: DISCONTINUED | OUTPATIENT
Start: 2023-03-24 | End: 2023-03-24

## 2023-03-24 RX ORDER — LINEZOLID 600 MG/300ML
600 INJECTION, SOLUTION INTRAVENOUS ONCE
Refills: 0 | Status: DISCONTINUED | OUTPATIENT
Start: 2023-03-24 | End: 2023-04-07

## 2023-03-24 RX ADMIN — HYDROMORPHONE HYDROCHLORIDE 0.5 MILLIGRAM(S): 2 INJECTION INTRAMUSCULAR; INTRAVENOUS; SUBCUTANEOUS at 13:30

## 2023-03-24 RX ADMIN — HYDROMORPHONE HYDROCHLORIDE 0.5 MILLIGRAM(S): 2 INJECTION INTRAMUSCULAR; INTRAVENOUS; SUBCUTANEOUS at 14:00

## 2023-03-24 NOTE — ASU DISCHARGE PLAN (ADULT/PEDIATRIC) - CALL YOUR DOCTOR IF YOU HAVE ANY OF THE FOLLOWING:
Bleeding that does not stop/Pain not relieved by Medications/Fever greater than (need to indicate Fahrenheit or Celsius)/Nausea and vomiting that does not stop/Unable to urinate/Inability to tolerate liquids or foods Bleeding that does not stop/Pain not relieved by Medications/Fever greater than (need to indicate Fahrenheit or Celsius)/Wound/Surgical Site with redness, or foul smelling discharge or pus/Nausea and vomiting that does not stop/Unable to urinate/Inability to tolerate liquids or foods

## 2023-03-24 NOTE — ASU DISCHARGE PLAN (ADULT/PEDIATRIC) - NS MD DC FALL RISK RISK
For information on Fall & Injury Prevention, visit: [email or web address] OR  [email or web address] OR  https://www.cdc.gov/steadi/patient.html

## 2023-03-24 NOTE — ASU DISCHARGE PLAN (ADULT/PEDIATRIC) - CARE PROVIDER_API CALL
Renzo Cade)  Urology  [address] [place] [address], [place] [address]  Phone: [phone]  Fax: [phone]  Follow Up Time:

## 2023-03-24 NOTE — ASU DISCHARGE PLAN (ADULT/PEDIATRIC) - ASU DC SPECIAL INSTRUCTIONSFT
You may take over the counter pain medicine as needed for pain.     Please complete course of antibiotics as prescribed preoperatively.    You may have intermittent blood tinged urine. This is normal. If your urine becomes bright red or with clots, or if your urine stops draining, please call the office.     Followup with Dr. Cade ___ for eubanks catheter removal. You may take over the counter pain medicine as needed for pain.     Please complete course of antibiotics as prescribed preoperatively.    You may have intermittent blood tinged urine. This is normal. If your urine becomes bright red or with clots, or if your urine stops draining, please call the office.     Followup with Dr. Cade next Thursday 3/30 for eubanks catheter removal.

## 2023-03-24 NOTE — ASU PATIENT PROFILE, ADULT - NSICDXPASTMEDICALHX_GEN_ALL_CORE_FT
PAST MEDICAL HISTORY:  Atrial fibrillation     Bladder-neck obstruction     Chronic renal insufficiency     History of cirrhosis of liver     HLD (hyperlipidemia)     HTN (hypertension)     Hypothyroidism     MARIMAR (obstructive sleep apnea)     Pulmonary hypertension

## 2023-03-25 LAB
GRAM STN FLD: SIGNIFICANT CHANGE UP
NIGHT BLUE STAIN TISS: SIGNIFICANT CHANGE UP
SPECIMEN SOURCE: SIGNIFICANT CHANGE UP
SPECIMEN SOURCE: SIGNIFICANT CHANGE UP

## 2023-03-27 LAB
-  AMIKACIN: SIGNIFICANT CHANGE UP
-  AMIKACIN: SIGNIFICANT CHANGE UP
-  AMOXICILLIN/CLAVULANIC ACID: SIGNIFICANT CHANGE UP
-  AMOXICILLIN/CLAVULANIC ACID: SIGNIFICANT CHANGE UP
-  AMPICILLIN/SULBACTAM: SIGNIFICANT CHANGE UP
-  AMPICILLIN/SULBACTAM: SIGNIFICANT CHANGE UP
-  AMPICILLIN: SIGNIFICANT CHANGE UP
-  AMPICILLIN: SIGNIFICANT CHANGE UP
-  AZTREONAM: SIGNIFICANT CHANGE UP
-  AZTREONAM: SIGNIFICANT CHANGE UP
-  CEFAZOLIN: SIGNIFICANT CHANGE UP
-  CEFAZOLIN: SIGNIFICANT CHANGE UP
-  CEFEPIME: SIGNIFICANT CHANGE UP
-  CEFEPIME: SIGNIFICANT CHANGE UP
-  CEFOTAXIME: SIGNIFICANT CHANGE UP
-  CEFOXITIN: SIGNIFICANT CHANGE UP
-  CEFOXITIN: SIGNIFICANT CHANGE UP
-  CEFTAZIDIME: SIGNIFICANT CHANGE UP
-  CEFTRIAXONE: SIGNIFICANT CHANGE UP
-  CEFTRIAXONE: SIGNIFICANT CHANGE UP
-  CEFUROXIME: SIGNIFICANT CHANGE UP
-  CIPROFLOXACIN: SIGNIFICANT CHANGE UP
-  CIPROFLOXACIN: SIGNIFICANT CHANGE UP
-  ERTAPENEM: SIGNIFICANT CHANGE UP
-  ERTAPENEM: SIGNIFICANT CHANGE UP
-  GENTAMICIN: SIGNIFICANT CHANGE UP
-  GENTAMICIN: SIGNIFICANT CHANGE UP
-  LEVOFLOXACIN: SIGNIFICANT CHANGE UP
-  LEVOFLOXACIN: SIGNIFICANT CHANGE UP
-  MEROPENEM: SIGNIFICANT CHANGE UP
-  MEROPENEM: SIGNIFICANT CHANGE UP
-  MINOCYCLINE: SIGNIFICANT CHANGE UP
-  NITROFURANTOIN: SIGNIFICANT CHANGE UP
-  NITROFURANTOIN: SIGNIFICANT CHANGE UP
-  PIPERACILLIN/TAZOBACTAM: SIGNIFICANT CHANGE UP
-  PIPERACILLIN/TAZOBACTAM: SIGNIFICANT CHANGE UP
-  TIGECYCLINE: SIGNIFICANT CHANGE UP
-  TOBRAMYCIN: SIGNIFICANT CHANGE UP
-  TOBRAMYCIN: SIGNIFICANT CHANGE UP
-  TRIMETHOPRIM/SULFAMETHOXAZOLE: SIGNIFICANT CHANGE UP
-  TRIMETHOPRIM/SULFAMETHOXAZOLE: SIGNIFICANT CHANGE UP
CULTURE RESULTS: SIGNIFICANT CHANGE UP
METHOD TYPE: SIGNIFICANT CHANGE UP
METHOD TYPE: SIGNIFICANT CHANGE UP
ORGANISM # SPEC MICROSCOPIC CNT: SIGNIFICANT CHANGE UP
SARS-COV-2 N GENE NPH QL NAA+PROBE: NOT DETECTED
SPECIMEN SOURCE: SIGNIFICANT CHANGE UP

## 2023-03-28 LAB
-  AMPICILLIN/SULBACTAM: SIGNIFICANT CHANGE UP
-  AMPICILLIN/SULBACTAM: SIGNIFICANT CHANGE UP
-  AMPICILLIN: SIGNIFICANT CHANGE UP
-  AMPICILLIN: SIGNIFICANT CHANGE UP
-  CEFTRIAXONE: SIGNIFICANT CHANGE UP
-  CEFTRIAXONE: SIGNIFICANT CHANGE UP
-  IMIPENEM: SIGNIFICANT CHANGE UP
-  NITROFURANTOIN: SIGNIFICANT CHANGE UP
-  NITROFURANTOIN: SIGNIFICANT CHANGE UP
ORGANISM # SPEC MICROSCOPIC CNT: SIGNIFICANT CHANGE UP

## 2023-03-30 ENCOUNTER — APPOINTMENT (OUTPATIENT)
Dept: UROLOGY | Facility: CLINIC | Age: 78
End: 2023-03-30
Payer: MEDICARE

## 2023-03-30 ENCOUNTER — OUTPATIENT (OUTPATIENT)
Dept: OUTPATIENT SERVICES | Facility: HOSPITAL | Age: 78
LOS: 1 days | End: 2023-03-30
Payer: MEDICARE

## 2023-03-30 VITALS
SYSTOLIC BLOOD PRESSURE: 120 MMHG | HEIGHT: 71 IN | BODY MASS INDEX: 29.68 KG/M2 | HEART RATE: 77 BPM | WEIGHT: 212 LBS | RESPIRATION RATE: 16 BRPM | DIASTOLIC BLOOD PRESSURE: 72 MMHG

## 2023-03-30 DIAGNOSIS — Z95.0 PRESENCE OF CARDIAC PACEMAKER: Chronic | ICD-10-CM

## 2023-03-30 DIAGNOSIS — Z95.2 PRESENCE OF PROSTHETIC HEART VALVE: Chronic | ICD-10-CM

## 2023-03-30 DIAGNOSIS — Z98.890 OTHER SPECIFIED POSTPROCEDURAL STATES: Chronic | ICD-10-CM

## 2023-03-30 DIAGNOSIS — Z90.49 ACQUIRED ABSENCE OF OTHER SPECIFIED PARTS OF DIGESTIVE TRACT: Chronic | ICD-10-CM

## 2023-03-30 PROCEDURE — 51700 IRRIGATION OF BLADDER: CPT

## 2023-03-30 PROCEDURE — 51700 IRRIGATION OF BLADDER: CPT | Mod: 58

## 2023-04-04 ENCOUNTER — NON-APPOINTMENT (OUTPATIENT)
Age: 78
End: 2023-04-04

## 2023-04-05 LAB
CULTURE RESULTS: SIGNIFICANT CHANGE UP
SPECIMEN SOURCE: SIGNIFICANT CHANGE UP

## 2023-04-14 NOTE — ASSESSMENT
[FreeTextEntry1] : Catheter removed today w/o difficulty.\par Was able to pass 16 self catheter w/o difficulty.\par Plan to follow up again in 3 months.

## 2023-04-14 NOTE — HISTORY OF PRESENT ILLNESS
[FreeTextEntry1] : s/p TURP in 2018.\par Had been on CIC but developed bladder neck contracture and unable to pass catheter.\par Had been managed with indwelling Jain catheter.\par S/p Cystoscopy, laser incision of bladder neck contracture 3/24/2023.\par Here for catheter removal.

## 2023-04-18 DIAGNOSIS — R33.9 RETENTION OF URINE, UNSPECIFIED: ICD-10-CM

## 2023-04-19 NOTE — PHYSICAL THERAPY INITIAL EVALUATION ADULT - REFERRING PHYSICIAN, REHAB EVAL
dr. greenberg(ordering); dr. patel(attending) Isotretinoin Pregnancy And Lactation Text: This medication is Pregnancy Category X and is considered extremely dangerous during pregnancy. It is unknown if it is excreted in breast milk.

## 2023-04-21 ENCOUNTER — NON-APPOINTMENT (OUTPATIENT)
Age: 78
End: 2023-04-21

## 2023-04-21 LAB
APPEARANCE: ABNORMAL
BACTERIA UR CULT: ABNORMAL
BACTERIA: ABNORMAL /HPF
BILIRUBIN URINE: NEGATIVE
BLOOD URINE: ABNORMAL
CAST: 3 /LPF
COLOR: YELLOW
EPITHELIAL CELLS: 1 /HPF
GLUCOSE QUALITATIVE U: NEGATIVE MG/DL
KETONES URINE: NEGATIVE MG/DL
LEUKOCYTE ESTERASE URINE: ABNORMAL
MICROSCOPIC-UA: NORMAL
NITRITE URINE: POSITIVE
PH URINE: 6
PROTEIN URINE: 30 MG/DL
RED BLOOD CELLS URINE: 288 /HPF
REVIEW: NORMAL
SPECIFIC GRAVITY URINE: 1.02
UROBILINOGEN URINE: 0.2 MG/DL
WHITE BLOOD CELLS URINE: 960 /HPF

## 2023-05-01 ENCOUNTER — RX CHANGE (OUTPATIENT)
Age: 78
End: 2023-05-01

## 2023-05-10 LAB
CULTURE RESULTS: SIGNIFICANT CHANGE UP
SPECIMEN SOURCE: SIGNIFICANT CHANGE UP

## 2023-06-19 ENCOUNTER — NON-APPOINTMENT (OUTPATIENT)
Age: 78
End: 2023-06-19

## 2023-07-03 ENCOUNTER — NON-APPOINTMENT (OUTPATIENT)
Age: 78
End: 2023-07-03

## 2023-07-06 ENCOUNTER — LABORATORY RESULT (OUTPATIENT)
Age: 78
End: 2023-07-06

## 2023-07-06 ENCOUNTER — APPOINTMENT (OUTPATIENT)
Dept: GASTROENTEROLOGY | Facility: CLINIC | Age: 78
End: 2023-07-06
Payer: MEDICARE

## 2023-07-06 VITALS
RESPIRATION RATE: 14 BRPM | DIASTOLIC BLOOD PRESSURE: 50 MMHG | WEIGHT: 208 LBS | HEIGHT: 71 IN | OXYGEN SATURATION: 96 % | SYSTOLIC BLOOD PRESSURE: 90 MMHG | BODY MASS INDEX: 29.12 KG/M2 | HEART RATE: 96 BPM

## 2023-07-06 DIAGNOSIS — K64.8 OTHER HEMORRHOIDS: ICD-10-CM

## 2023-07-06 DIAGNOSIS — K22.70 BARRETT'S ESOPHAGUS W/OUT DYSPLASIA: ICD-10-CM

## 2023-07-06 DIAGNOSIS — K31.89 PORTAL HYPERTENSION: ICD-10-CM

## 2023-07-06 DIAGNOSIS — K57.30 DIVERTICULOSIS OF LARGE INTESTINE W/OUT PERFORATION OR ABSCESS W/OUT BLEEDING: ICD-10-CM

## 2023-07-06 DIAGNOSIS — R18.8 UNSPECIFIED CIRRHOSIS OF LIVER: ICD-10-CM

## 2023-07-06 DIAGNOSIS — G47.33 OBSTRUCTIVE SLEEP APNEA (ADULT) (PEDIATRIC): ICD-10-CM

## 2023-07-06 DIAGNOSIS — Z86.79 PERSONAL HISTORY OF OTHER DISEASES OF THE CIRCULATORY SYSTEM: ICD-10-CM

## 2023-07-06 DIAGNOSIS — K62.5 HEMORRHAGE OF ANUS AND RECTUM: ICD-10-CM

## 2023-07-06 DIAGNOSIS — Z86.39 PERSONAL HISTORY OF OTHER ENDOCRINE, NUTRITIONAL AND METABOLIC DISEASE: ICD-10-CM

## 2023-07-06 DIAGNOSIS — K74.60 UNSPECIFIED CIRRHOSIS OF LIVER: ICD-10-CM

## 2023-07-06 DIAGNOSIS — K76.6 PORTAL HYPERTENSION: ICD-10-CM

## 2023-07-06 PROCEDURE — 99214 OFFICE O/P EST MOD 30 MIN: CPT

## 2023-07-06 RX ORDER — LEVOTHYROXINE SODIUM 0.05 MG/1
50 TABLET ORAL
Refills: 0 | Status: ACTIVE | COMMUNITY

## 2023-07-06 RX ORDER — FINERENONE 10 MG/1
10 TABLET, FILM COATED ORAL
Refills: 0 | Status: ACTIVE | COMMUNITY

## 2023-07-06 RX ORDER — CHLORHEXIDINE/GLYCERIN/HE-CELL
JELLY (GRAM) TOPICAL
Qty: 3 | Refills: 11 | Status: DISCONTINUED | OUTPATIENT
Start: 2019-09-10 | End: 2023-07-06

## 2023-07-06 RX ORDER — SILDENAFIL 20 MG/1
20 TABLET ORAL
Refills: 0 | Status: ACTIVE | COMMUNITY

## 2023-07-06 RX ORDER — ATORVASTATIN CALCIUM 20 MG/1
20 TABLET, FILM COATED ORAL
Qty: 90 | Refills: 0 | Status: ACTIVE | COMMUNITY
Start: 2017-04-25

## 2023-07-06 RX ORDER — POTASSIUM CHLORIDE 750 MG/1
10 TABLET, FILM COATED, EXTENDED RELEASE ORAL
Qty: 180 | Refills: 0 | Status: ACTIVE | COMMUNITY
Start: 2021-03-04

## 2023-07-06 RX ORDER — PANTOPRAZOLE 40 MG/1
40 TABLET, DELAYED RELEASE ORAL
Qty: 90 | Refills: 3 | Status: ACTIVE | COMMUNITY
Start: 2021-07-22

## 2023-07-06 RX ORDER — METOPROLOL TARTRATE 50 MG/1
50 TABLET, FILM COATED ORAL
Refills: 0 | Status: ACTIVE | COMMUNITY

## 2023-07-06 RX ORDER — BUMETANIDE 1 MG/1
1 TABLET ORAL
Refills: 0 | Status: ACTIVE | COMMUNITY

## 2023-07-06 RX ORDER — TROSPIUM CHLORIDE 20 MG/1
20 TABLET, FILM COATED ORAL TWICE DAILY
Qty: 180 | Refills: 2 | Status: ACTIVE | COMMUNITY
Start: 2023-04-04

## 2023-07-06 RX ORDER — HYDRALAZINE HYDROCHLORIDE 50 MG/1
50 TABLET ORAL
Refills: 0 | Status: DISCONTINUED | COMMUNITY
End: 2023-07-06

## 2023-07-06 NOTE — REASON FOR VISIT
[Follow-up] : a follow-up of an existing diagnosis [FreeTextEntry1] : Cirrhosis with ascites and renal failure.  Recent TAVR and placement of permanent pacemaker

## 2023-07-06 NOTE — HISTORY OF PRESENT ILLNESS
[de-identified] : 9/22 with segment Muse's esophagus, portal hypertensive gastropathy but no varices. [FreeTextEntry1] : 5/18 negative.  Personal history of colon polyps and family history of colon cancer.

## 2023-07-06 NOTE — ASSESSMENT
[FreeTextEntry1] : Chronic stable alcoholic cirrhosis/decompensated.  3 of short segment Muse's esophagus diverticulosis hemorrhoids and colon polyps and portal hypertensive gastropathy.  All medications to remain unchanged.\par Too Unstable for elective GI procedures.  GI office reevaluate in 4 months.

## 2023-07-07 LAB
AFP-TM SERPL-MCNC: <1.8 NG/ML
ALBUMIN SERPL ELPH-MCNC: 2.7 G/DL
ALP BLD-CCNC: 72 U/L
ALT SERPL-CCNC: 11 U/L
ANION GAP SERPL CALC-SCNC: 13 MMOL/L
AST SERPL-CCNC: 17 U/L
BILIRUB SERPL-MCNC: 0.4 MG/DL
BUN SERPL-MCNC: 104 MG/DL
CALCIUM SERPL-MCNC: 8 MG/DL
CHLORIDE SERPL-SCNC: 104 MMOL/L
CO2 SERPL-SCNC: 28 MMOL/L
CREAT SERPL-MCNC: 2.41 MG/DL
EGFR: 27 ML/MIN/1.73M2
GLUCOSE SERPL-MCNC: 130 MG/DL
INR PPP: 1.14 RATIO
POTASSIUM SERPL-SCNC: 4.1 MMOL/L
PROT SERPL-MCNC: 5.4 G/DL
PT BLD: 13.2 SEC
SODIUM SERPL-SCNC: 144 MMOL/L

## 2023-07-12 ENCOUNTER — NON-APPOINTMENT (OUTPATIENT)
Age: 78
End: 2023-07-12

## 2023-07-14 ENCOUNTER — NON-APPOINTMENT (OUTPATIENT)
Age: 78
End: 2023-07-14

## 2023-07-14 LAB — GI PCR PANEL: NOT DETECTED

## 2023-07-17 ENCOUNTER — NON-APPOINTMENT (OUTPATIENT)
Age: 78
End: 2023-07-17

## 2023-07-17 LAB
BACTERIA STL CULT: NORMAL
DEPRECATED O AND P PREP STL: NORMAL

## 2023-07-18 ENCOUNTER — NON-APPOINTMENT (OUTPATIENT)
Age: 78
End: 2023-07-18

## 2023-07-18 LAB
C DIFF TOXIN B QL PCR REFLEX: NORMAL
GDH ANTIGEN: NOT DETECTED
TOXIN A AND B: NOT DETECTED

## 2023-08-02 NOTE — PHYSICAL THERAPY INITIAL EVALUATION ADULT - ADDITIONAL COMMENTS
pt states he lives in a 2nd floor condo with a stairglide. he is independent with mobility prior to admit. has a rollator his son got him he uses to rest when in the kitchen doing tasks. +driving. Partial Purse String (Intermediate) Text: Given the location of the defect and the characteristics of the surrounding skin an intermediate purse string closure was deemed most appropriate.  Undermining was performed circumfirentially around the surgical defect.  A purse string suture was then placed and tightened. Wound tension only allowed a partial closure of the circular defect.

## 2023-09-24 ENCOUNTER — NON-APPOINTMENT (OUTPATIENT)
Age: 78
End: 2023-09-24

## 2023-09-25 ENCOUNTER — NON-APPOINTMENT (OUTPATIENT)
Age: 78
End: 2023-09-25

## 2023-09-26 ENCOUNTER — APPOINTMENT (OUTPATIENT)
Dept: UROLOGY | Facility: CLINIC | Age: 78
End: 2023-09-26
Payer: MEDICARE

## 2023-09-26 VITALS
HEART RATE: 99 BPM | SYSTOLIC BLOOD PRESSURE: 128 MMHG | RESPIRATION RATE: 15 BRPM | TEMPERATURE: 98.3 F | DIASTOLIC BLOOD PRESSURE: 62 MMHG

## 2023-09-26 DIAGNOSIS — N32.0 BLADDER-NECK OBSTRUCTION: ICD-10-CM

## 2023-09-26 PROCEDURE — 99213 OFFICE O/P EST LOW 20 MIN: CPT

## 2023-09-29 DIAGNOSIS — N39.42 INCONTINENCE W/OUT SENSORY AWARENESS: ICD-10-CM

## 2023-09-29 RX ORDER — CEFDINIR 300 MG/1
300 CAPSULE ORAL
Qty: 14 | Refills: 0 | Status: COMPLETED | COMMUNITY
Start: 2023-09-29 | End: 2023-10-07

## 2023-10-03 ENCOUNTER — EMERGENCY (EMERGENCY)
Facility: HOSPITAL | Age: 78
LOS: 1 days | Discharge: HOME | End: 2023-10-03
Attending: EMERGENCY MEDICINE
Payer: MEDICARE

## 2023-10-03 VITALS
RESPIRATION RATE: 18 BRPM | HEIGHT: 71 IN | SYSTOLIC BLOOD PRESSURE: 114 MMHG | OXYGEN SATURATION: 94 % | DIASTOLIC BLOOD PRESSURE: 74 MMHG | TEMPERATURE: 98 F | HEART RATE: 98 BPM | WEIGHT: 225.09 LBS

## 2023-10-03 DIAGNOSIS — Z98.890 OTHER SPECIFIED POSTPROCEDURAL STATES: Chronic | ICD-10-CM

## 2023-10-03 DIAGNOSIS — Z95.2 PRESENCE OF PROSTHETIC HEART VALVE: Chronic | ICD-10-CM

## 2023-10-03 DIAGNOSIS — Z90.49 ACQUIRED ABSENCE OF OTHER SPECIFIED PARTS OF DIGESTIVE TRACT: Chronic | ICD-10-CM

## 2023-10-03 DIAGNOSIS — Z95.0 PRESENCE OF CARDIAC PACEMAKER: Chronic | ICD-10-CM

## 2023-10-03 LAB
ALBUMIN SERPL ELPH-MCNC: 2 G/DL — LOW (ref 3.3–5.2)
ALP SERPL-CCNC: 76 U/L — SIGNIFICANT CHANGE UP (ref 40–120)
ALT FLD-CCNC: 8 U/L — SIGNIFICANT CHANGE UP
ANION GAP SERPL CALC-SCNC: 11 MMOL/L — SIGNIFICANT CHANGE UP (ref 5–17)
APTT BLD: 29.7 SEC — SIGNIFICANT CHANGE UP (ref 24.5–35.6)
AST SERPL-CCNC: 14 U/L — SIGNIFICANT CHANGE UP
BASOPHILS # BLD AUTO: 0.03 K/UL — SIGNIFICANT CHANGE UP (ref 0–0.2)
BASOPHILS NFR BLD AUTO: 0.3 % — SIGNIFICANT CHANGE UP (ref 0–2)
BILIRUB SERPL-MCNC: 0.5 MG/DL — SIGNIFICANT CHANGE UP (ref 0.4–2)
BUN SERPL-MCNC: 106.8 MG/DL — HIGH (ref 8–20)
CALCIUM SERPL-MCNC: 8 MG/DL — LOW (ref 8.4–10.5)
CHLORIDE SERPL-SCNC: 104 MMOL/L — SIGNIFICANT CHANGE UP (ref 96–108)
CO2 SERPL-SCNC: 25 MMOL/L — SIGNIFICANT CHANGE UP (ref 22–29)
CREAT SERPL-MCNC: 2.74 MG/DL — HIGH (ref 0.5–1.3)
EGFR: 23 ML/MIN/1.73M2 — LOW
EOSINOPHIL # BLD AUTO: 0.1 K/UL — SIGNIFICANT CHANGE UP (ref 0–0.5)
EOSINOPHIL NFR BLD AUTO: 1 % — SIGNIFICANT CHANGE UP (ref 0–6)
GLUCOSE SERPL-MCNC: 124 MG/DL — HIGH (ref 70–99)
HCT VFR BLD CALC: 34.1 % — LOW (ref 39–50)
HGB BLD-MCNC: 10 G/DL — LOW (ref 13–17)
IMM GRANULOCYTES NFR BLD AUTO: 0.4 % — SIGNIFICANT CHANGE UP (ref 0–0.9)
INR BLD: 1.2 RATIO — HIGH (ref 0.85–1.18)
LYMPHOCYTES # BLD AUTO: 0.61 K/UL — LOW (ref 1–3.3)
LYMPHOCYTES # BLD AUTO: 5.9 % — LOW (ref 13–44)
MCHC RBC-ENTMCNC: 26.3 PG — LOW (ref 27–34)
MCHC RBC-ENTMCNC: 29.3 GM/DL — LOW (ref 32–36)
MCV RBC AUTO: 89.7 FL — SIGNIFICANT CHANGE UP (ref 80–100)
MONOCYTES # BLD AUTO: 1.19 K/UL — HIGH (ref 0–0.9)
MONOCYTES NFR BLD AUTO: 11.5 % — SIGNIFICANT CHANGE UP (ref 2–14)
NEUTROPHILS # BLD AUTO: 8.35 K/UL — HIGH (ref 1.8–7.4)
NEUTROPHILS NFR BLD AUTO: 80.9 % — HIGH (ref 43–77)
PLATELET # BLD AUTO: 180 K/UL — SIGNIFICANT CHANGE UP (ref 150–400)
POTASSIUM SERPL-MCNC: 4.2 MMOL/L — SIGNIFICANT CHANGE UP (ref 3.5–5.3)
POTASSIUM SERPL-SCNC: 4.2 MMOL/L — SIGNIFICANT CHANGE UP (ref 3.5–5.3)
PROT SERPL-MCNC: 4.8 G/DL — LOW (ref 6.6–8.7)
PROTHROM AB SERPL-ACNC: 13.3 SEC — HIGH (ref 9.5–13)
RBC # BLD: 3.8 M/UL — LOW (ref 4.2–5.8)
RBC # FLD: 18.3 % — HIGH (ref 10.3–14.5)
SODIUM SERPL-SCNC: 140 MMOL/L — SIGNIFICANT CHANGE UP (ref 135–145)
WBC # BLD: 10.32 K/UL — SIGNIFICANT CHANGE UP (ref 3.8–10.5)
WBC # FLD AUTO: 10.32 K/UL — SIGNIFICANT CHANGE UP (ref 3.8–10.5)

## 2023-10-03 PROCEDURE — 85730 THROMBOPLASTIN TIME PARTIAL: CPT

## 2023-10-03 PROCEDURE — 93005 ELECTROCARDIOGRAM TRACING: CPT

## 2023-10-03 PROCEDURE — 99285 EMERGENCY DEPT VISIT HI MDM: CPT | Mod: 25

## 2023-10-03 PROCEDURE — 85025 COMPLETE CBC W/AUTO DIFF WBC: CPT

## 2023-10-03 PROCEDURE — 71250 CT THORAX DX C-: CPT | Mod: MA

## 2023-10-03 PROCEDURE — 80053 COMPREHEN METABOLIC PANEL: CPT

## 2023-10-03 PROCEDURE — 93010 ELECTROCARDIOGRAM REPORT: CPT

## 2023-10-03 PROCEDURE — 71045 X-RAY EXAM CHEST 1 VIEW: CPT | Mod: 26

## 2023-10-03 PROCEDURE — 85610 PROTHROMBIN TIME: CPT

## 2023-10-03 PROCEDURE — 71045 X-RAY EXAM CHEST 1 VIEW: CPT

## 2023-10-03 PROCEDURE — 99284 EMERGENCY DEPT VISIT MOD MDM: CPT

## 2023-10-03 PROCEDURE — 36415 COLL VENOUS BLD VENIPUNCTURE: CPT

## 2023-10-03 RX ORDER — LIDOCAINE 4 G/100G
1 CREAM TOPICAL ONCE
Refills: 0 | Status: COMPLETED | OUTPATIENT
Start: 2023-10-03 | End: 2023-10-03

## 2023-10-03 RX ORDER — ACETAMINOPHEN 500 MG
650 TABLET ORAL ONCE
Refills: 0 | Status: COMPLETED | OUTPATIENT
Start: 2023-10-03 | End: 2023-10-03

## 2023-10-03 RX ADMIN — Medication 650 MILLIGRAM(S): at 22:26

## 2023-10-03 RX ADMIN — LIDOCAINE 1 PATCH: 4 CREAM TOPICAL at 22:26

## 2023-10-03 NOTE — ED PROVIDER NOTE - CLINICAL SUMMARY MEDICAL DECISION MAKING FREE TEXT BOX
78-year-old male with past medical history of A-fib, pulmonary hypertension, CKD, neurogenic bladder requiring self-catheterization, hypothyroidism, who presents ED for right lower rib cage pain status post fall 3 days ago. Will obtain basic labs, CT chest to eval for rib fx/effusions, pain control, and reassess.

## 2023-10-03 NOTE — ED PROVIDER NOTE - PROGRESS NOTE DETAILS
: Patient received a signout, CT chest showed no acute fracture.  Small pleural effusion.  No hypoxia.  Pain control, outpatient follow-up recommended.

## 2023-10-03 NOTE — ED PROVIDER NOTE - HOW PATIENT ADDRESSED, PROFILE
Braxton Lopez Name_______________________________________Printed:____________________  Date and time of surgery__09/29/21____0830__________________Arrival Time:___0700____[facility]_________   1. The instructions given regarding when and if a patient needs to stop oral intake prior to surgery varies. Follow the specific instructions you were given                  _X__Nothing to eat or to drink after Midnight the night before.                   ____Carbo loading or ERAS instructions will be given to select patients-if you have been given those instructions -please do the following                           The evening before your surgery after dinner before midnight drink 40 ounces of gatorade. If you are diabetic use sugar free. The morning of surgery drink 40 ounces of water. This needs to be finished 3 hours prior to your surgery start time. 2. Take the following pills with a small sip of water on the morning of surgery_____NA______________________________________________                  Do not take blood pressure medications ending in pril or sartan the nishi prior to surgery or the morning of surgery_   3. Aspirin, Ibuprofen, Advil, Naproxen, Vitamin E and other Anti-inflammatory products and supplements should be stopped for 5 -7days before surgery or as directed by your physician. 4. Check with your Doctor regarding stopping Plavix, Coumadin,Eliquis, Lovenox,Effient,Pradaxa,Xarelto, Fragmin or other blood thinners and follow their instructions. 5. Do not smoke, and do not drink any alcoholic beverages 24 hours prior to surgery. This includes NA Beer. Refrain from the usage of any recreational drugs. 6. You may brush your teeth and gargle the morning of surgery. DO NOT SWALLOW WATER   7. You MUST make arrangements for a responsible adult to stay on site while you are here and take you home after your surgery. You will not be allowed to leave alone or drive yourself home.   It is strongly suggested someone stay with you the first 24 hrs. Your surgery will be cancelled if you do not have a ride home. 8. A parent/legal guardian must accompany a child scheduled for surgery and plan to stay at the hospital until the child is discharged. Please do not bring other children with you. 9. Please wear simple, loose fitting clothing to the hospital.  Danielle Loyd not bring valuables (money, credit cards, checkbooks, etc.) Do not wear any makeup (including no eye makeup) or nail polish on your fingers or toes. 10. DO NOT wear any jewelry or piercings on day of surgery. All body piercing jewelry must be removed. 11. If you have ___dentures, they will be removed before going to the OR; we will provide you a container. If you wear ___contact lenses or _X__glasses, they will be removed; please bring a case for them. 12. Please see your family doctor/pediatrician for a history & physical and/or concerning medications. Bring any test results/reports from your physician's office. PCP__________________Phone___________H&P Appt. Date________             13 If you  have a Living Will and Durable Power of  for Healthcare, please bring in a copy. 15. Notify your Surgeon if you develop any illness between now and surgery  time, cough, cold, fever, sore throat, nausea, vomiting, etc.  Please notify your surgeon if you experience dizziness, shortness of breath or blurred vision between now & the time of your surgery             15. DO NOT shave your operative site 96 hours prior to surgery. For face & neck surgery, men may use an electric razor 48 hours prior to surgery. 16. Shower the night before or morning of surgery using an antibacterial soap or as you have been instructed. 17. To provide excellent care visitors will be limited to one in the room at any given time. 18.  Please bring picture ID and insurance card.              19.  Visit our web site for additional information:  [email or web address]              20.During flu season no children under the age of 15 are permitted in the hospital for the safety of all patients. 21. If you take a long acting insulin in the evening only  take half of your usual  dose the night  before your procedure              22. If you use a c-pap please bring DOS if staying overnight,             23.For your convenience [facility] has a pharmacy on site to fill your prescriptions. 24. If you use oxygen and have a portable tank please bring it  with you the DOS             25. Bring a complete list of all your medications with name and dose include any supplements. 26. Other__________________________________________   *Please call pre admission testing if you any further questions   [address]. [place]  [phone]   [facility] [address] ELOISE JEFFERS   [phone]           COVID TESTING    _X__ Covid test to be done 3-5 days prior to scheduled surgery -patient aware they are REQUIRED to bring a copy of the negative result DOS-if they receive a positive result to notify their surgeon         If known - indicate where patient is getting covid test done _____[facility] 9/25______________________________________________________    ___ Rapid - DOS    ___ Other___________________________________      Dinorah Gordon POLICY(subject to change)    There is a one visitor policy at [facility] for all surgeries and endoscopies. Whether the visitor can stay or will be asked to wait in the car will depend on the current policy and if social distancing can be maintained. The policy is subject to change at any time. Please make sure the visitor has a cell phone that is on,charged and able to accept calls, as this may be the way that the staff communicates with them. Pain management is NO VISITOR policyThe patients ride is expected to remain in the car with a cell phone for communication. If the ride is leaving the hospital grounds please make sure they are back in time for pickup. Have the patient inform the staff on arrival what their rides plans are while the patient is in the facility. At the MAIN there is one visitor allowed. Please note that the visitor policy is subject to change. All above information reviewed with patient in person or by phone. Patient verbalizes understanding. All questions and concerns addressed.                                                                                                  Patient/Rep_Patient___________________                                                                                                                                    PRE OP INSTRUCTIONS

## 2023-10-03 NOTE — ED PROVIDER NOTE - ATTENDING CONTRIBUTION TO CARE
I have personally performed a history and physical examination of the patient and discussed management with the resident as well as the patient.  I reviewed the resident's note and agree with the documented findings and plan of care.  I have authored and modified critical sections of the Provider Note, including but not limited to HPI, Physical Exam and MDM.    78-year-old male with past medical history of A-fib, pulmonary hypertension, CKD, neurogenic bladder requiring self-catheterization, hypothyroidism, who presents ED for right lower  rib cage pain status post fall 3 days ago. States that he went to urgent care 2 days ago and had a negative chest x-ray and was given pain meds and discharged.  States that he pain has worsened and also states that he thinks that his baseline shortness of breath is worsening as well.  Tender to palpation over the right lateral ribs, consider fracture versus contusion.  Will provide symptomatic control as needed and obtain CT thorax.  Patient is hemodynamically stable at this time, no current concern for PTX given duration of symptoms and physical examination. I have personally performed a history and physical examination of the patient and discussed management with the resident as well as the patient.  I reviewed the resident's note and agree with the documented findings and plan of care.  I have authored and modified critical sections of the Provider Note, including but not limited to HPI, Physical Exam and MDM.    78-year-old male with past medical history of A-fib, pulmonary hypertension, CKD, neurogenic bladder requiring self-catheterization, hypothyroidism, who presents ED for right lower  rib cage pain status post fall 3 days ago. States that he went to urgent care 2 days ago and had a negative chest x-ray and was given pain meds and discharged.  States that he pain has worsened and also states that he thinks that his baseline shortness of breath is worsening as well.  Tender to palpation over the right lateral ribs, consider fracture versus contusion.  Will provide symptomatic control as needed and obtain CT thorax.  Patient is hemodynamically stable at this time, no current concern for PTX given duration of symptoms and physical examination.    Progress note 2211: Independent review of EKG shows atrial fibrillation without STEMI and frequent PVCs.  As per independent chart review similar to February 3, 2023.

## 2023-10-03 NOTE — ED PROVIDER NOTE - OBJECTIVE STATEMENT
78-year-old male with past medical history of A-fib, pulmonary hypertension, CKD, neurogenic bladder requiring self-catheterization, hypothyroidism, who presents ED for right lower  rib cage pain status post fall 3 days ago.  Patient states that he was sitting a recliner reached forward to grab the remote and ended up falling onto the carpeted floor.  Denies head strike or loss consciousness.  States that he landed on his right chest wall area.  Also endorses skin tear to his left wrist area and right knee, however denies any other injuries.  States that he went to urgent care 2 days ago and had a negative chest x-ray and was given pain meds and discharged.  States that he pain has worsened and also states that he thinks that his baseline shortness of breath is worsening as well.  Otherwise denies any other complaints this time.

## 2023-10-03 NOTE — ED ADULT TRIAGE NOTE - CHIEF COMPLAINT QUOTE
Pt. states he had a single mechanical fall 3 days ago. Endorsing pain to L wrist, R knee and R rib cage. Seen at urgent care and x-rays were negative. States pain is worsening on his R. side of his rib cage, worse with expiration. Denies head strike/LOC/AC use.

## 2023-10-03 NOTE — ED ADULT NURSE NOTE - NSFALLHARMRISKINTERV_ED_ALL_ED

## 2023-10-03 NOTE — ED PROVIDER NOTE - PATIENT PORTAL LINK FT
You can access the FollowMyHealth Patient Portal offered by [facility] by registering at the following website: [email or web address]. By joining [employer]’s FollowMyHealth portal, you will also be able to view your health information using other applications (apps) compatible with our system.

## 2023-10-03 NOTE — ED PROVIDER NOTE - NSFOLLOWUPINSTRUCTIONS_ED_ALL_ED_FT
Chest Contusion, Adult  A chest contusion is a deep bruise on the chest. Contusions are usually the result of a blunt injury to tissues under the skin. The injury can damage the small blood vessels under the skin, which causes bleeding under the skin. The skin over the contusion may turn blue, purple, or yellow. You might not have pain with a contusion from a minor injury. A more severe injury may lead to a painful and swollen contusion that may last for weeks.    What are the causes?  A contusion is usually caused by a blow, trauma, or direct force to your chest. These injuries are often the result of:  Motor vehicle crashes.  Falls.  Bicycle injuries.  Contact sport injuries.  What increases the risk?  You may be at a higher risk for a chest contusion if you play a sport in which contact and falls are common. Football and soccer are common examples of this kind of sport.    What are the signs or symptoms?  Symptoms of this condition include:  Swelling in the chest.  Pain and tenderness in your chest.  Pain with some movements of your upper torso.  Discoloration of your chest. The area may have redness and then turn blue, purple, or yellow.  Pain when taking deep breaths.  How is this diagnosed?  This condition may be diagnosed based on your medical history and a physical exam.    You may also have other tests, including:  X-ray to check if there were any other injuries, such as broken bones (fractures).  CT scan, ultrasound, or MRI if internal injuries are suspected.  Pulse oximetry, if you have trouble breathing. This test shows the amount of oxygen in your blood.  How is this treated?  This condition may be treated with:  Rest.  Applying ice to the injured area.  Deep-breathing exercises to reduce the risk of pneumonia.  Oxygen therapy. This may be given if you have trouble breathing or have low oxygen levels.  Over-the-counter medicines for pain control.  Follow these instructions at home:  Bag of ice on a towel on the skin.  Managing pain, stiffness, and swelling    If directed, put ice on the injured area. To do this:  Put ice in a plastic bag.  Place a towel between your skin and the bag.  Leave the ice on for 20 minutes, 2–3 times per day.  Remove the ice if your skin turns bright red. This is very important. If you cannot feel pain, heat, or cold, you have a greater risk of damage to the area.  Take over-the-counter and prescription medicines only as told by your health care provider.  General instructions    Do not lift anything that is heavier than 10 lb (4.5 kg), or the limit that you are told, until your health care provider says that it is safe.  Rest as told by your health care provider. Avoid sitting for a long time without moving.  Get up to take short walks every 1–2 hours. This is important to improve blood flow and breathing.  Ask for help if you feel weak or unsteady.  Do deep-breathing exercises if your health care provider tells you to do so.  Do not use any products that contain nicotine or tobacco. These products include cigarettes, chewing tobacco, and vaping devices, such as e-cigarettes. If you need help quitting, ask your health care provider.  Keep all follow-up visits. This is important.  Contact a health care provider if:  Your swelling or pain is not relieved with medicines or treatment.  You have increased bruising or swelling.  You have pain that is getting worse.  Your symptoms have not improved after 1 week.  Get help right away if:  You have a sudden increase in pain.  You have difficulty breathing.  You have dizziness, weakness, or fainting.  You have blood in your urine or stool.  You cough up blood or you vomit blood.  These symptoms may represent a serious problem that is an emergency. Do not wait to see if the symptoms will go away. Get medical help right away. Call your local emergency services (911 in the [place] Do not drive yourself to the hospital.    Summary  A chest contusion is a deep bruise to the chest that is usually caused by a blow, trauma, or direct force to the chest.  Symptoms of this condition include chest pain, tenderness, swelling, and discoloration.  Treatment for a chest contusion may include resting and applying ice to the injured area.  Contact a health care provider if you have problems breathing or if your pain does not improve with treatment.  This information is not intended to replace advice given to you by your health care provider. Make sure you discuss any questions you have with your health care provider. Tranexamic Acid Counseling:  Patient advised of the small risk of bleeding problems with tranexamic acid. They were also instructed to call if they developed any nausea, vomiting or diarrhea. All of the patient's questions and concerns were addressed.

## 2023-10-03 NOTE — ED PROVIDER NOTE - PHYSICAL EXAMINATION
General: NAD  Head: NC, AT  EENT: no scleral icterus  Cardiac: RRR, no apparent murmurs, no lower extremity edema  Respiratory: CTABL, no respiratory distress   Abdomen: soft, ND, NT, nonperitonitic  MSK/Vascular: R lower ribcage ttp  Neuro: grossly intact  Psych: calm, cooperative

## 2023-10-03 NOTE — ED PROVIDER NOTE - NS ED ROS FT
Constitutional: no fever, no chills  Head: NC, AT  Eyes: no redness  ENMT: no nasal congestion/drainage, no sore throat   CV: no chest pain, no edema  Resp: no cough, no dyspnea  GI: no abdominal pain, no nausea, no vomiting, no diarrhea  : no dysuria  Skin: no lesions, no rashes   Neuro: no LOC, no headache, no sensory deficits

## 2023-10-04 VITALS
DIASTOLIC BLOOD PRESSURE: 67 MMHG | HEART RATE: 55 BPM | TEMPERATURE: 98 F | SYSTOLIC BLOOD PRESSURE: 121 MMHG | RESPIRATION RATE: 18 BRPM | OXYGEN SATURATION: 95 %

## 2023-10-04 PROCEDURE — 71250 CT THORAX DX C-: CPT | Mod: 26,MA

## 2023-10-04 NOTE — ED ADULT NURSE REASSESSMENT NOTE - NS ED NURSE REASSESS COMMENT FT1
Assume care of pt from previous RN. Pt awake, NAD. Breathing even and unlabored. Offering no complaints at this time.

## 2023-10-04 NOTE — ED ADULT NURSE REASSESSMENT NOTE - NS ED NURSE REASSESS COMMENT FT1
Assumed care of pt at 2330 as stated in report from ZOFIA rasheed. Charting as noted. Patient A&O x4, denies pain/discomfort, denies CP/SOB. RR even and unlabored. Updated on the plan of care. Call bell within reach, bed locked in lowest position. IV site flushed w/ NS. No redness, swelling or pain noted to site. No signs of acute distress noted, safety maintained. Pt awaiting CT.

## 2023-10-06 ENCOUNTER — NON-APPOINTMENT (OUTPATIENT)
Age: 78
End: 2023-10-06

## 2023-10-06 ENCOUNTER — APPOINTMENT (OUTPATIENT)
Dept: UROLOGY | Facility: CLINIC | Age: 78
End: 2023-10-06
Payer: MEDICARE

## 2023-10-06 DIAGNOSIS — R33.8 OTHER RETENTION OF URINE: ICD-10-CM

## 2023-10-06 DIAGNOSIS — R60.0 LOCALIZED EDEMA: ICD-10-CM

## 2023-10-06 DIAGNOSIS — N50.89 OTHER SPECIFIED DISORDERS OF THE MALE GENITAL ORGANS: ICD-10-CM

## 2023-10-06 DIAGNOSIS — R33.9 RETENTION OF URINE, UNSPECIFIED: ICD-10-CM

## 2023-10-06 DIAGNOSIS — R60.9 EDEMA, UNSPECIFIED: ICD-10-CM

## 2023-10-06 PROCEDURE — 99215 OFFICE O/P EST HI 40 MIN: CPT

## 2023-10-07 LAB
ANION GAP SERPL CALC-SCNC: 13 MMOL/L
APPEARANCE: CLEAR
APPEARANCE: CLEAR
BACTERIA UR CULT: ABNORMAL
BACTERIA: ABNORMAL /HPF
BACTERIA: ABNORMAL /HPF
BILIRUBIN URINE: NEGATIVE
BILIRUBIN URINE: NEGATIVE
BLOOD URINE: NEGATIVE
BLOOD URINE: NEGATIVE
BUN SERPL-MCNC: 91 MG/DL
CALCIUM SERPL-MCNC: 7.9 MG/DL
CAST: 2 /LPF
CAST: 5 /LPF
CHLORIDE SERPL-SCNC: 105 MMOL/L
CO2 SERPL-SCNC: 25 MMOL/L
COLOR: YELLOW
COLOR: YELLOW
CREAT SERPL-MCNC: 2.52 MG/DL
EGFR: 26 ML/MIN/1.73M2
EPITHELIAL CELLS: 1 /HPF
EPITHELIAL CELLS: 1 /HPF
GLUCOSE QUALITATIVE U: NEGATIVE MG/DL
GLUCOSE QUALITATIVE U: NEGATIVE MG/DL
GLUCOSE SERPL-MCNC: 138 MG/DL
HCT VFR BLD CALC: 34.1 %
HGB BLD-MCNC: 9.7 G/DL
KETONES URINE: NEGATIVE MG/DL
KETONES URINE: NEGATIVE MG/DL
LEUKOCYTE ESTERASE URINE: ABNORMAL
LEUKOCYTE ESTERASE URINE: ABNORMAL
MCHC RBC-ENTMCNC: 26.6 PG
MCHC RBC-ENTMCNC: 28.4 GM/DL
MCV RBC AUTO: 93.4 FL
MICROSCOPIC-UA: NORMAL
MICROSCOPIC-UA: NORMAL
NITRITE URINE: NEGATIVE
NITRITE URINE: POSITIVE
PH URINE: 5.5
PH URINE: 5.5
PLATELET # BLD AUTO: 177 K/UL
POTASSIUM SERPL-SCNC: 3.5 MMOL/L
PROTEIN URINE: NEGATIVE MG/DL
PROTEIN URINE: NEGATIVE MG/DL
RBC # BLD: 3.65 M/UL
RBC # FLD: 18.4 %
RED BLOOD CELLS URINE: 0 /HPF
RED BLOOD CELLS URINE: 2 /HPF
SODIUM SERPL-SCNC: 143 MMOL/L
SPECIFIC GRAVITY URINE: 1.01
SPECIFIC GRAVITY URINE: 1.02
UROBILINOGEN URINE: 0.2 MG/DL
UROBILINOGEN URINE: 0.2 MG/DL
WBC # FLD AUTO: 10.12 K/UL
WHITE BLOOD CELLS URINE: 11 /HPF
WHITE BLOOD CELLS URINE: 35 /HPF

## 2023-10-12 LAB — BACTERIA UR CULT: ABNORMAL

## 2023-10-16 ENCOUNTER — NON-APPOINTMENT (OUTPATIENT)
Age: 78
End: 2023-10-16

## 2023-10-17 ENCOUNTER — OUTPATIENT (OUTPATIENT)
Dept: OUTPATIENT SERVICES | Facility: HOSPITAL | Age: 78
LOS: 1 days | End: 2023-10-17
Payer: MEDICARE

## 2023-10-17 ENCOUNTER — APPOINTMENT (OUTPATIENT)
Dept: UROLOGY | Facility: CLINIC | Age: 78
End: 2023-10-17
Payer: MEDICARE

## 2023-10-17 VITALS
RESPIRATION RATE: 16 BRPM | BODY MASS INDEX: 32.21 KG/M2 | DIASTOLIC BLOOD PRESSURE: 62 MMHG | WEIGHT: 225 LBS | HEIGHT: 70 IN | HEART RATE: 108 BPM | SYSTOLIC BLOOD PRESSURE: 97 MMHG

## 2023-10-17 DIAGNOSIS — Z90.49 ACQUIRED ABSENCE OF OTHER SPECIFIED PARTS OF DIGESTIVE TRACT: Chronic | ICD-10-CM

## 2023-10-17 DIAGNOSIS — Z98.890 OTHER SPECIFIED POSTPROCEDURAL STATES: Chronic | ICD-10-CM

## 2023-10-17 DIAGNOSIS — Z95.2 PRESENCE OF PROSTHETIC HEART VALVE: Chronic | ICD-10-CM

## 2023-10-17 DIAGNOSIS — R19.7 DIARRHEA, UNSPECIFIED: ICD-10-CM

## 2023-10-17 DIAGNOSIS — Z95.0 PRESENCE OF CARDIAC PACEMAKER: Chronic | ICD-10-CM

## 2023-10-17 DIAGNOSIS — R33.9 RETENTION OF URINE, UNSPECIFIED: ICD-10-CM

## 2023-10-17 PROCEDURE — 99213 OFFICE O/P EST LOW 20 MIN: CPT

## 2023-10-17 PROCEDURE — 51702 INSERT TEMP BLADDER CATH: CPT

## 2023-10-17 RX ORDER — METRONIDAZOLE 500 MG/1
500 TABLET ORAL 3 TIMES DAILY
Qty: 30 | Refills: 0 | Status: ACTIVE | COMMUNITY
Start: 2023-10-17 | End: 1900-01-01

## 2023-10-21 ENCOUNTER — INPATIENT (INPATIENT)
Facility: HOSPITAL | Age: 78
LOS: 8 days | Discharge: NURSING, REHAB OR LONG-TERM CARE | DRG: 291 | End: 2023-10-30
Attending: STUDENT IN AN ORGANIZED HEALTH CARE EDUCATION/TRAINING PROGRAM | Admitting: STUDENT IN AN ORGANIZED HEALTH CARE EDUCATION/TRAINING PROGRAM
Payer: MEDICARE

## 2023-10-21 VITALS
OXYGEN SATURATION: 90 % | DIASTOLIC BLOOD PRESSURE: 47 MMHG | TEMPERATURE: 98 F | RESPIRATION RATE: 16 BRPM | SYSTOLIC BLOOD PRESSURE: 84 MMHG | HEIGHT: 71 IN | HEART RATE: 84 BPM | WEIGHT: 220.02 LBS

## 2023-10-21 DIAGNOSIS — Z98.890 OTHER SPECIFIED POSTPROCEDURAL STATES: Chronic | ICD-10-CM

## 2023-10-21 DIAGNOSIS — Z90.49 ACQUIRED ABSENCE OF OTHER SPECIFIED PARTS OF DIGESTIVE TRACT: Chronic | ICD-10-CM

## 2023-10-21 DIAGNOSIS — Z95.2 PRESENCE OF PROSTHETIC HEART VALVE: Chronic | ICD-10-CM

## 2023-10-21 DIAGNOSIS — R09.02 HYPOXEMIA: ICD-10-CM

## 2023-10-21 DIAGNOSIS — Z95.0 PRESENCE OF CARDIAC PACEMAKER: Chronic | ICD-10-CM

## 2023-10-21 LAB
ACANTHOCYTES BLD QL SMEAR: SLIGHT — SIGNIFICANT CHANGE UP
ACANTHOCYTES BLD QL SMEAR: SLIGHT — SIGNIFICANT CHANGE UP
ALBUMIN SERPL ELPH-MCNC: 2.1 G/DL — LOW (ref 3.3–5.2)
ALBUMIN SERPL ELPH-MCNC: 2.1 G/DL — LOW (ref 3.3–5.2)
ALP SERPL-CCNC: 71 U/L — SIGNIFICANT CHANGE UP (ref 40–120)
ALP SERPL-CCNC: 71 U/L — SIGNIFICANT CHANGE UP (ref 40–120)
ALT FLD-CCNC: 8 U/L — SIGNIFICANT CHANGE UP
ALT FLD-CCNC: 8 U/L — SIGNIFICANT CHANGE UP
ANION GAP SERPL CALC-SCNC: 15 MMOL/L — SIGNIFICANT CHANGE UP (ref 5–17)
ANION GAP SERPL CALC-SCNC: 15 MMOL/L — SIGNIFICANT CHANGE UP (ref 5–17)
ANION GAP SERPL CALC-SCNC: 20 MMOL/L — HIGH (ref 5–17)
ANION GAP SERPL CALC-SCNC: 20 MMOL/L — HIGH (ref 5–17)
ANISOCYTOSIS BLD QL: SIGNIFICANT CHANGE UP
ANISOCYTOSIS BLD QL: SIGNIFICANT CHANGE UP
APTT BLD: 32.8 SEC — SIGNIFICANT CHANGE UP (ref 24.5–35.6)
APTT BLD: 32.8 SEC — SIGNIFICANT CHANGE UP (ref 24.5–35.6)
AST SERPL-CCNC: 20 U/L — SIGNIFICANT CHANGE UP
AST SERPL-CCNC: 20 U/L — SIGNIFICANT CHANGE UP
BASOPHILS # BLD AUTO: 0 K/UL — SIGNIFICANT CHANGE UP (ref 0–0.2)
BASOPHILS # BLD AUTO: 0 K/UL — SIGNIFICANT CHANGE UP (ref 0–0.2)
BASOPHILS NFR BLD AUTO: 0 % — SIGNIFICANT CHANGE UP (ref 0–2)
BASOPHILS NFR BLD AUTO: 0 % — SIGNIFICANT CHANGE UP (ref 0–2)
BILIRUB SERPL-MCNC: 0.6 MG/DL — SIGNIFICANT CHANGE UP (ref 0.4–2)
BILIRUB SERPL-MCNC: 0.6 MG/DL — SIGNIFICANT CHANGE UP (ref 0.4–2)
BUN SERPL-MCNC: 102.1 MG/DL — HIGH (ref 8–20)
BUN SERPL-MCNC: 102.1 MG/DL — HIGH (ref 8–20)
BUN SERPL-MCNC: 105 MG/DL — HIGH (ref 8–20)
BUN SERPL-MCNC: 105 MG/DL — HIGH (ref 8–20)
CALCIUM SERPL-MCNC: 7.2 MG/DL — LOW (ref 8.4–10.5)
CHLORIDE SERPL-SCNC: 100 MMOL/L — SIGNIFICANT CHANGE UP (ref 96–108)
CHLORIDE SERPL-SCNC: 100 MMOL/L — SIGNIFICANT CHANGE UP (ref 96–108)
CHLORIDE SERPL-SCNC: 103 MMOL/L — SIGNIFICANT CHANGE UP (ref 96–108)
CHLORIDE SERPL-SCNC: 103 MMOL/L — SIGNIFICANT CHANGE UP (ref 96–108)
CO2 SERPL-SCNC: 17 MMOL/L — LOW (ref 22–29)
CO2 SERPL-SCNC: 17 MMOL/L — LOW (ref 22–29)
CO2 SERPL-SCNC: 22 MMOL/L — SIGNIFICANT CHANGE UP (ref 22–29)
CO2 SERPL-SCNC: 22 MMOL/L — SIGNIFICANT CHANGE UP (ref 22–29)
CREAT SERPL-MCNC: 3.17 MG/DL — HIGH (ref 0.5–1.3)
CREAT SERPL-MCNC: 3.17 MG/DL — HIGH (ref 0.5–1.3)
CREAT SERPL-MCNC: 3.42 MG/DL — HIGH (ref 0.5–1.3)
CREAT SERPL-MCNC: 3.42 MG/DL — HIGH (ref 0.5–1.3)
DACRYOCYTES BLD QL SMEAR: SLIGHT — SIGNIFICANT CHANGE UP
DACRYOCYTES BLD QL SMEAR: SLIGHT — SIGNIFICANT CHANGE UP
EGFR: 18 ML/MIN/1.73M2 — LOW
EGFR: 18 ML/MIN/1.73M2 — LOW
EGFR: 19 ML/MIN/1.73M2 — LOW
EGFR: 19 ML/MIN/1.73M2 — LOW
ELLIPTOCYTES BLD QL SMEAR: SIGNIFICANT CHANGE UP
ELLIPTOCYTES BLD QL SMEAR: SIGNIFICANT CHANGE UP
EOSINOPHIL # BLD AUTO: 0 K/UL — SIGNIFICANT CHANGE UP (ref 0–0.5)
EOSINOPHIL # BLD AUTO: 0 K/UL — SIGNIFICANT CHANGE UP (ref 0–0.5)
EOSINOPHIL NFR BLD AUTO: 0 % — SIGNIFICANT CHANGE UP (ref 0–6)
EOSINOPHIL NFR BLD AUTO: 0 % — SIGNIFICANT CHANGE UP (ref 0–6)
GIANT PLATELETS BLD QL SMEAR: PRESENT — SIGNIFICANT CHANGE UP
GIANT PLATELETS BLD QL SMEAR: PRESENT — SIGNIFICANT CHANGE UP
GLUCOSE SERPL-MCNC: 108 MG/DL — HIGH (ref 70–99)
GLUCOSE SERPL-MCNC: 108 MG/DL — HIGH (ref 70–99)
GLUCOSE SERPL-MCNC: 127 MG/DL — HIGH (ref 70–99)
GLUCOSE SERPL-MCNC: 127 MG/DL — HIGH (ref 70–99)
HCT VFR BLD CALC: 34.6 % — LOW (ref 39–50)
HCT VFR BLD CALC: 34.6 % — LOW (ref 39–50)
HGB BLD-MCNC: 10 G/DL — LOW (ref 13–17)
HGB BLD-MCNC: 10 G/DL — LOW (ref 13–17)
INR BLD: 1.45 RATIO — HIGH (ref 0.85–1.18)
INR BLD: 1.45 RATIO — HIGH (ref 0.85–1.18)
LYMPHOCYTES # BLD AUTO: 0.11 K/UL — LOW (ref 1–3.3)
LYMPHOCYTES # BLD AUTO: 0.11 K/UL — LOW (ref 1–3.3)
LYMPHOCYTES # BLD AUTO: 0.9 % — LOW (ref 13–44)
LYMPHOCYTES # BLD AUTO: 0.9 % — LOW (ref 13–44)
MAGNESIUM SERPL-MCNC: 2 MG/DL — SIGNIFICANT CHANGE UP (ref 1.6–2.6)
MANUAL SMEAR VERIFICATION: SIGNIFICANT CHANGE UP
MANUAL SMEAR VERIFICATION: SIGNIFICANT CHANGE UP
MCHC RBC-ENTMCNC: 24.6 PG — LOW (ref 27–34)
MCHC RBC-ENTMCNC: 24.6 PG — LOW (ref 27–34)
MCHC RBC-ENTMCNC: 28.9 GM/DL — LOW (ref 32–36)
MCHC RBC-ENTMCNC: 28.9 GM/DL — LOW (ref 32–36)
MCV RBC AUTO: 85.2 FL — SIGNIFICANT CHANGE UP (ref 80–100)
MCV RBC AUTO: 85.2 FL — SIGNIFICANT CHANGE UP (ref 80–100)
MICROCYTES BLD QL: SIGNIFICANT CHANGE UP
MICROCYTES BLD QL: SIGNIFICANT CHANGE UP
MONOCYTES # BLD AUTO: 0.63 K/UL — SIGNIFICANT CHANGE UP (ref 0–0.9)
MONOCYTES # BLD AUTO: 0.63 K/UL — SIGNIFICANT CHANGE UP (ref 0–0.9)
MONOCYTES NFR BLD AUTO: 5.3 % — SIGNIFICANT CHANGE UP (ref 2–14)
MONOCYTES NFR BLD AUTO: 5.3 % — SIGNIFICANT CHANGE UP (ref 2–14)
MYELOCYTES NFR BLD: 0.9 % — HIGH (ref 0–0)
MYELOCYTES NFR BLD: 0.9 % — HIGH (ref 0–0)
NEUTROPHILS # BLD AUTO: 10.99 K/UL — HIGH (ref 1.8–7.4)
NEUTROPHILS # BLD AUTO: 10.99 K/UL — HIGH (ref 1.8–7.4)
NEUTROPHILS NFR BLD AUTO: 92.9 % — HIGH (ref 43–77)
NEUTROPHILS NFR BLD AUTO: 92.9 % — HIGH (ref 43–77)
NT-PROBNP SERPL-SCNC: HIGH PG/ML (ref 0–300)
NT-PROBNP SERPL-SCNC: HIGH PG/ML (ref 0–300)
PLAT MORPH BLD: NORMAL — SIGNIFICANT CHANGE UP
PLAT MORPH BLD: NORMAL — SIGNIFICANT CHANGE UP
PLATELET # BLD AUTO: 227 K/UL — SIGNIFICANT CHANGE UP (ref 150–400)
PLATELET # BLD AUTO: 227 K/UL — SIGNIFICANT CHANGE UP (ref 150–400)
POIKILOCYTOSIS BLD QL AUTO: SIGNIFICANT CHANGE UP
POIKILOCYTOSIS BLD QL AUTO: SIGNIFICANT CHANGE UP
POLYCHROMASIA BLD QL SMEAR: SLIGHT — SIGNIFICANT CHANGE UP
POLYCHROMASIA BLD QL SMEAR: SLIGHT — SIGNIFICANT CHANGE UP
POTASSIUM SERPL-MCNC: 3.8 MMOL/L — SIGNIFICANT CHANGE UP (ref 3.5–5.3)
POTASSIUM SERPL-MCNC: 3.8 MMOL/L — SIGNIFICANT CHANGE UP (ref 3.5–5.3)
POTASSIUM SERPL-MCNC: 3.9 MMOL/L — SIGNIFICANT CHANGE UP (ref 3.5–5.3)
POTASSIUM SERPL-MCNC: 3.9 MMOL/L — SIGNIFICANT CHANGE UP (ref 3.5–5.3)
POTASSIUM SERPL-SCNC: 3.8 MMOL/L — SIGNIFICANT CHANGE UP (ref 3.5–5.3)
POTASSIUM SERPL-SCNC: 3.8 MMOL/L — SIGNIFICANT CHANGE UP (ref 3.5–5.3)
POTASSIUM SERPL-SCNC: 3.9 MMOL/L — SIGNIFICANT CHANGE UP (ref 3.5–5.3)
POTASSIUM SERPL-SCNC: 3.9 MMOL/L — SIGNIFICANT CHANGE UP (ref 3.5–5.3)
PROT SERPL-MCNC: 5.1 G/DL — LOW (ref 6.6–8.7)
PROT SERPL-MCNC: 5.1 G/DL — LOW (ref 6.6–8.7)
PROTHROM AB SERPL-ACNC: 15.9 SEC — HIGH (ref 9.5–13)
PROTHROM AB SERPL-ACNC: 15.9 SEC — HIGH (ref 9.5–13)
RBC # BLD: 4.06 M/UL — LOW (ref 4.2–5.8)
RBC # BLD: 4.06 M/UL — LOW (ref 4.2–5.8)
RBC # FLD: 18.4 % — HIGH (ref 10.3–14.5)
RBC # FLD: 18.4 % — HIGH (ref 10.3–14.5)
RBC BLD AUTO: ABNORMAL
RBC BLD AUTO: ABNORMAL
SMUDGE CELLS # BLD: PRESENT — SIGNIFICANT CHANGE UP
SMUDGE CELLS # BLD: PRESENT — SIGNIFICANT CHANGE UP
SODIUM SERPL-SCNC: 137 MMOL/L — SIGNIFICANT CHANGE UP (ref 135–145)
SODIUM SERPL-SCNC: 137 MMOL/L — SIGNIFICANT CHANGE UP (ref 135–145)
SODIUM SERPL-SCNC: 140 MMOL/L — SIGNIFICANT CHANGE UP (ref 135–145)
SODIUM SERPL-SCNC: 140 MMOL/L — SIGNIFICANT CHANGE UP (ref 135–145)
TROPONIN T SERPL-MCNC: 0.11 NG/ML — HIGH (ref 0–0.06)
TROPONIN T SERPL-MCNC: 0.11 NG/ML — HIGH (ref 0–0.06)
TROPONIN T SERPL-MCNC: 0.12 NG/ML — HIGH (ref 0–0.06)
TROPONIN T SERPL-MCNC: 0.12 NG/ML — HIGH (ref 0–0.06)
WBC # BLD: 11.83 K/UL — HIGH (ref 3.8–10.5)
WBC # BLD: 11.83 K/UL — HIGH (ref 3.8–10.5)
WBC # FLD AUTO: 11.83 K/UL — HIGH (ref 3.8–10.5)
WBC # FLD AUTO: 11.83 K/UL — HIGH (ref 3.8–10.5)

## 2023-10-21 PROCEDURE — 93010 ELECTROCARDIOGRAM REPORT: CPT

## 2023-10-21 PROCEDURE — 71045 X-RAY EXAM CHEST 1 VIEW: CPT | Mod: 26

## 2023-10-21 PROCEDURE — 99223 1ST HOSP IP/OBS HIGH 75: CPT

## 2023-10-21 PROCEDURE — 93010 ELECTROCARDIOGRAM REPORT: CPT | Mod: 77

## 2023-10-21 PROCEDURE — 73130 X-RAY EXAM OF HAND: CPT | Mod: 26,RT

## 2023-10-21 PROCEDURE — 99291 CRITICAL CARE FIRST HOUR: CPT

## 2023-10-21 PROCEDURE — 99497 ADVNCD CARE PLAN 30 MIN: CPT | Mod: 25

## 2023-10-21 PROCEDURE — 73562 X-RAY EXAM OF KNEE 3: CPT | Mod: 26,LT

## 2023-10-21 RX ORDER — ATORVASTATIN CALCIUM 80 MG/1
1 TABLET, FILM COATED ORAL
Qty: 0 | Refills: 0 | DISCHARGE

## 2023-10-21 RX ORDER — ACETAMINOPHEN 500 MG
650 TABLET ORAL ONCE
Refills: 0 | Status: COMPLETED | OUTPATIENT
Start: 2023-10-21 | End: 2023-10-21

## 2023-10-21 RX ORDER — ONDANSETRON 8 MG/1
4 TABLET, FILM COATED ORAL EVERY 8 HOURS
Refills: 0 | Status: DISCONTINUED | OUTPATIENT
Start: 2023-10-21 | End: 2023-10-30

## 2023-10-21 RX ORDER — LANOLIN ALCOHOL/MO/W.PET/CERES
3 CREAM (GRAM) TOPICAL AT BEDTIME
Refills: 0 | Status: DISCONTINUED | OUTPATIENT
Start: 2023-10-21 | End: 2023-10-30

## 2023-10-21 RX ORDER — METOLAZONE 5 MG/1
1 TABLET ORAL
Refills: 0 | DISCHARGE

## 2023-10-21 RX ORDER — PANTOPRAZOLE SODIUM 20 MG/1
40 TABLET, DELAYED RELEASE ORAL
Refills: 0 | Status: DISCONTINUED | OUTPATIENT
Start: 2023-10-21 | End: 2023-10-30

## 2023-10-21 RX ORDER — METOPROLOL TARTRATE 50 MG
75 TABLET ORAL DAILY
Refills: 0 | Status: DISCONTINUED | OUTPATIENT
Start: 2023-10-21 | End: 2023-10-30

## 2023-10-21 RX ORDER — TREPROSTINIL 48 UG/1
1 INHALANT ORAL
Refills: 0 | DISCHARGE

## 2023-10-21 RX ORDER — FUROSEMIDE 40 MG
80 TABLET ORAL ONCE
Refills: 0 | Status: COMPLETED | OUTPATIENT
Start: 2023-10-21 | End: 2023-10-21

## 2023-10-21 RX ORDER — ATORVASTATIN CALCIUM 80 MG/1
20 TABLET, FILM COATED ORAL AT BEDTIME
Refills: 0 | Status: DISCONTINUED | OUTPATIENT
Start: 2023-10-21 | End: 2023-10-30

## 2023-10-21 RX ORDER — ALLOPURINOL 300 MG
1 TABLET ORAL
Qty: 0 | Refills: 0 | DISCHARGE

## 2023-10-21 RX ORDER — FUROSEMIDE 40 MG
40 TABLET ORAL ONCE
Refills: 0 | Status: DISCONTINUED | OUTPATIENT
Start: 2023-10-21 | End: 2023-10-21

## 2023-10-21 RX ORDER — LEVOTHYROXINE SODIUM 125 MCG
50 TABLET ORAL DAILY
Refills: 0 | Status: DISCONTINUED | OUTPATIENT
Start: 2023-10-21 | End: 2023-10-30

## 2023-10-21 RX ORDER — PANTOPRAZOLE SODIUM 20 MG/1
1 TABLET, DELAYED RELEASE ORAL
Qty: 0 | Refills: 0 | DISCHARGE

## 2023-10-21 RX ORDER — HEPARIN SODIUM 5000 [USP'U]/ML
5000 INJECTION INTRAVENOUS; SUBCUTANEOUS EVERY 8 HOURS
Refills: 0 | Status: DISCONTINUED | OUTPATIENT
Start: 2023-10-21 | End: 2023-10-30

## 2023-10-21 RX ORDER — BUMETANIDE 0.25 MG/ML
1 INJECTION INTRAMUSCULAR; INTRAVENOUS
Refills: 0 | Status: DISCONTINUED | OUTPATIENT
Start: 2023-10-21 | End: 2023-10-23

## 2023-10-21 RX ORDER — INFLUENZA VIRUS VACCINE 15; 15; 15; 15 UG/.5ML; UG/.5ML; UG/.5ML; UG/.5ML
0.7 SUSPENSION INTRAMUSCULAR ONCE
Refills: 0 | Status: DISCONTINUED | OUTPATIENT
Start: 2023-10-21 | End: 2023-10-30

## 2023-10-21 RX ORDER — ACETAMINOPHEN 500 MG
650 TABLET ORAL EVERY 6 HOURS
Refills: 0 | Status: DISCONTINUED | OUTPATIENT
Start: 2023-10-21 | End: 2023-10-30

## 2023-10-21 RX ADMIN — Medication 50 MICROGRAM(S): at 18:39

## 2023-10-21 RX ADMIN — HEPARIN SODIUM 5000 UNIT(S): 5000 INJECTION INTRAVENOUS; SUBCUTANEOUS at 21:48

## 2023-10-21 RX ADMIN — Medication 80 MILLIGRAM(S): at 16:17

## 2023-10-21 RX ADMIN — ATORVASTATIN CALCIUM 20 MILLIGRAM(S): 80 TABLET, FILM COATED ORAL at 21:48

## 2023-10-21 RX ADMIN — BUMETANIDE 1 MILLIGRAM(S): 0.25 INJECTION INTRAMUSCULAR; INTRAVENOUS at 18:38

## 2023-10-21 RX ADMIN — Medication 75 MILLIGRAM(S): at 18:38

## 2023-10-21 RX ADMIN — Medication 20 MILLIGRAM(S): at 22:25

## 2023-10-21 RX ADMIN — PANTOPRAZOLE SODIUM 40 MILLIGRAM(S): 20 TABLET, DELAYED RELEASE ORAL at 18:39

## 2023-10-21 NOTE — ED ADULT NURSE NOTE - NSFALLRISKINTERV_ED_ALL_ED

## 2023-10-21 NOTE — ED ADULT NURSE NOTE - NS_SISCREENINGSR_GEN_ALL_ED
Detail Level: Simple
Additional Notes: Patient consent was obtained to proceed with the visit and recommended plan of care after discussion of all risks and benefits, including the risks of COVID-19 exposure
Negative

## 2023-10-21 NOTE — PATIENT PROFILE ADULT - FUNCTIONAL ASSESSMENT - BASIC MOBILITY 6.
3-calculated by average/Not able to assess (calculate score using [facility] averaging method)

## 2023-10-21 NOTE — ED ADULT TRIAGE NOTE - CHIEF COMPLAINT QUOTE
trip and fall from standing height x 2. - loc. - blood thinners. "felt like I couldn't stay up anymore and fell."

## 2023-10-21 NOTE — ED PROVIDER NOTE - CLINICAL SUMMARY MEDICAL DECISION MAKING FREE TEXT BOX
79 y/o M with PMHx A-fib, pulmonary hypertension, CKD, neurogenic bladder requiring self-catheterization, hypothyroidism, who presents to the ED for fall from standing height. States that he was walking at home when he felt his legs gave out and fell and landed on his L knee and then R hand. Will obtain basic labs, trop, bnp, cxr, ekg, vbg, bcx, UA/Ux, place on supplemental o2, and reassess. 77 y/o M with PMHx A-fib, pulmonary hypertension, CKD, neurogenic bladder requiring self-catheterization, hypothyroidism, who presents to the ED for fall from standing height. States that he was walking at home when he felt his legs gave out and fell and landed on his L knee and then R hand. Will obtain basic labs, trop, bnp, cxr, ekg, vbg, bcx, UA/Ux, place on supplemental o2, and reassess.    : 78-year-old male history of A-fib not on anticoagulation, pulmonary hypertension, neurogenic bladder with self-catheterization, hypothyroid,  BPH and GERD presents with 2 episodes of fall.  Patient report  tripping over while walking, he was able to get up.  Then later on he felt that his leg gave out and caused him to fall.  Upon arrival patient was found to be hypoxic in the 80s and improved on Ventimask.   EKG showed A-fib, right bundle branch block with multiple PVCs.   Chest x-ray showed pulmonary edema.  X-ray of the hands and knee without acute fracture.  Blood work showed elevated troponin of 0.12, which has been chronically elevated in the past.  proBNP 15,886.  hypoxia likely due to fluid overload due to CHF.  Cardiology consulted.  Lasix given.  Patient admitted.

## 2023-10-21 NOTE — H&P ADULT - ASSESSMENT
77 yo male with hx of A. fib not on AC, Pulmonary HTN, Neurogenic bladder with self cath, hypothyroid, HTN, BPH and GERD presented after a fall. Patient reports that he lives alone and was walking from one room to another and then tripped and fell. Reports that he laid on the floor for 3 hour and then his son and daughter in law presented to pick him up. Then patient was ambulating and again his legs gave out and he fell. This time he asked his son to take him to the hospital for an evaluation. While in the ED patient was noted to be hypoxic into the Low 80s. Patient was placed on oxygen with noted improvement. BNP was noted to be over doubled with an increase in troponin. Admission to medicine was requested for acute hypoxic respiratory failure.    #Acute hypoxic respiratory failure  Possibly related to Heart failure exacerbation  Related to Severe pulmonary hypertension  Tele monitoring  BNP of 97849  Trop on 0.12  C/w oxygen as needed  Wean oxygen as tolerated, Should do a 6MWT prior to discharge  Was given Lasix in ED  Cardiology consult  TTE  C/w Metolazone  Will place on Bumex 1mg IV BID  Will give Metoprolol    #Pulmonary Hypertension  Will give Sildenafil 20mg TID  Will give Tyvasio 16 q4h - 4 times a day    #TIERRA on CKD4  Appears that his baseline ranges around 2 to 2.7  Currently at 3.17  Possibly related to cardiorenal  Renal consult  Reports to take Kerendia at home - Will defer to renal for starting in hospital   Avoid nephrotoxic agents    #Fall  Xrays of right hand and left knee performed  PT evaluation    #Afib  Not on any ac  C.w metoprolol  Amiodarone     #Neurogenic bladder   Currently with eubanks - Can continue    #Hypothyroid  Levothyroxine    DVT prophylaxis: Heparin

## 2023-10-21 NOTE — ED PROVIDER NOTE - OBJECTIVE STATEMENT
77 y/o M with PMHx A-fib, pulmonary hypertension, CKD, neurogenic bladder requiring self-catheterization, hypothyroidism, who presents to the ED for fall from standing height. States that he was walking at home when he felt his legs gave out and fell and landed on his L knee and then R hand. Denies head-strike or LOC. Denies blood-thinner use. Patient noted to be hypoxic to low 80s on RA on arrival here and placed on venti mask with improvement. Denies any worsening shortness of breath and states that he is not on oxygen at home. Otherwise denies any dizziness, lightheadedness, chest pain, NVD, abdominal pain, dysuria, or any other complaints at this time.

## 2023-10-21 NOTE — H&P ADULT - HISTORY OF PRESENT ILLNESS
79 yo male with hx of A. fib not on AC, Pulmonary HTN, Neurogenic bladder with self cath, hypothyroid, HTN, BPH and GERD presented after a fall. Patient reports that he lives alone and was walking from one room to another and then tripped and fell. Reports that he laid on the floor for 3 hour and then his son and daughter in law presented to pick him up. Then patient was ambulating and again his legs gave out and he fell. This time he asked his son to take him to the hospital for an evaluation. While in the ED patient was noted to be hypoxic into the Low 80s. Patient was placed on oxygen with noted improvement. BNP was noted to be over doubled with an increase in troponin. Admission to medicine was requested for acute hypoxic respiratory failure.

## 2023-10-21 NOTE — ED ADULT NURSE NOTE - OBJECTIVE STATEMENT
pt awake, alert and oriented x3 presents to ED states he was standing and his legs gave out causing him to fall to the ground.  abrasions to right hand. denies hitting head, LOC or blood thinners. pt states he has a euabnks catheter in for recent UTI.  swelling and redness noted to left arm. moving all extremities well and with purpose.  pt found to be hypoxic, placed on venti mask @ 3L.  respirations even and unlabored.

## 2023-10-21 NOTE — ED PROVIDER NOTE - NS ED ROS FT
Constitutional: no fever, no chills  Head: NC, AT  Eyes: no redness  ENMT: no nasal congestion/drainage  CV: no chest pain, no edema  Resp: no cough, + dyspnea  GI: no abdominal pain, no nausea, no vomiting  : no dysuria  Skin: no lesions, no rashes   Neuro: no LOC, no headache, no sensory deficits

## 2023-10-21 NOTE — ED PROVIDER NOTE - PHYSICAL EXAMINATION
General: NAD  Head: NC, AT  EENT: no scleral icterus  Cardiac: RRR, no apparent murmurs, no lower extremity edema  Respiratory: CTABL, no respiratory distress   Abdomen: soft, ND, NT, nonperitonitic  MSK/Vascular: soft compartments, warm extremities  Neuro: grossly intact  Psych: calm, cooperative

## 2023-10-21 NOTE — H&P ADULT - NSHPREVIEWOFSYSTEMS_GEN_ALL_CORE
Review of Systems:  CONSTITUTIONAL: No weakness, fevers or chills  EYES/ENT: No visual changes;  No vertigo or throat pain   NECK: No pain or stiffness  RESPIRATORY: No cough, wheezing, hemoptysis   CARDIOVASCULAR: No chest pain or palpitations  GASTROINTESTINAL: No abdominal or epigastric pain. No nausea, vomiting, or hematemesis; No diarrhea or constipation.   GENITOURINARY: No dysuria, frequency or hematuria  NEUROLOGICAL:+Weakness  SKIN: No itching, burning, rashes, or lesions   All other review of systems is negative unless indicated above

## 2023-10-21 NOTE — ED ADULT NURSE NOTE - NSFALLCONCLUSION_ED_ALL_ED
Called patient to inform her of message for PT/INR dosage.  Patient has appointment next Wednesday.  
Continue same warfarin.  Recheck pro time in 4 weeks  
Patient came to the office for PT/INR  INR- 2.5  PT- 30.5  Current dose of Warfarin 7.5mg M/W/F,  5mg  Tues/Thur/SAT/Sun  Please advise  
Fall Risk

## 2023-10-21 NOTE — H&P ADULT - CONVERSATION DETAILS
GOC was discussed with patient. States that he is full code and wants all interventions as needed. States that his son would make decisions for him if he were not able to .

## 2023-10-21 NOTE — PATIENT PROFILE ADULT - HOW PATIENT ADDRESSED, PROFILE
Outreach attempt was made to schedule a Medicare Wellness Visit. This was the first attempt. Contact was made, MWV appointment scheduled.    
Outreach attempt was made to schedule a Medicare Wellness Visit. This was the first attempt. Contact was not made, no answer/busy.     Medicare letter and questionnaire mailed to patient.  
Braxton Lopez

## 2023-10-21 NOTE — H&P ADULT - NSHPPHYSICALEXAM_GEN_ALL_CORE
PHYSICAL EXAM:  Vital Signs Last 24 Hrs  T(F): 98 (21 Oct 2023 13:52), Max: 98 (21 Oct 2023 13:52)  HR: 88 (21 Oct 2023 16:47) (76 - 88)  BP: 118/65 (21 Oct 2023 16:47) (84/47 - 133/68)  RR: 18 (21 Oct 2023 16:47) (16 - 18)  SpO2: 100% (21 Oct 2023 16:47) (90% - 100%)    GENERAL: NAD, Resting in bed, Oxygen via NC  Eyes: EOMI, PERRLA  ENMT: Conjunctiva and sclera clear; supple neck, No JVD  Cardiovascular: S1,S2,  Irregular  Respiratory Coarse breath sounds  GI: Bowel sounds present; Soft, Nontender, Nondistended. No hepatomegaly  Genitourinary: Jain   Skin:  Chronic venous stasis   Neurology: Alert & Oriented X3, non-focal and spontaneous movements of all extremities, CN 2 to 12 grossly intact   Psych: Appropriate mood and affect, calm, pleasant, Responds appropriately to questions

## 2023-10-21 NOTE — ED ADULT NURSE REASSESSMENT NOTE - NS ED NURSE REASSESS COMMENT FT1
Assumed care from Kim RN in cc at 1630. Pt a&ox4, VSS,  and cardiac monitoring in place, admitting MD at bedside, no distress noted.
Gave report to Darlyn in cdu, pt a&ox4, VSS, respirations even and unlabored on room air, telemetry box in place no distress noted.
order for UA, spoke with Dr. Chu who stated to NOT change eubanks due to difficulty inserting eubanks in the past.  OK to hold off on UA at this time.  eubanks leg bag changed to large bedside drainage bag.

## 2023-10-21 NOTE — ED PROVIDER NOTE - ATTENDING CONTRIBUTION TO CARE
I, Jay Jay Mayorga, personally saw the patient with the resident, and completed the key components of the history and physical exam. I then discussed the management plan with the resident.    Upon my evaluation, this patient had a high probability of imminent or life-threatening deterioration due to hypoxia_ , which required my direct attention, intervention, and personal management.    see MDM    I have personally provided _40_ minutes of critical care time exculsive of time spent on separately billable procedures.  Time includes review of laboratory data, radiology results, discussion with consultants, and monitoring for potential decompensation.  Interventions were performed as documented.

## 2023-10-22 LAB
A1C WITH ESTIMATED AVERAGE GLUCOSE RESULT: 6.2 % — HIGH (ref 4–5.6)
A1C WITH ESTIMATED AVERAGE GLUCOSE RESULT: 6.2 % — HIGH (ref 4–5.6)
ALBUMIN SERPL ELPH-MCNC: 1.7 G/DL — LOW (ref 3.3–5.2)
ALBUMIN SERPL ELPH-MCNC: 1.7 G/DL — LOW (ref 3.3–5.2)
ALP SERPL-CCNC: 56 U/L — SIGNIFICANT CHANGE UP (ref 40–120)
ALP SERPL-CCNC: 56 U/L — SIGNIFICANT CHANGE UP (ref 40–120)
ALT FLD-CCNC: 6 U/L — SIGNIFICANT CHANGE UP
ALT FLD-CCNC: 6 U/L — SIGNIFICANT CHANGE UP
ANION GAP SERPL CALC-SCNC: 15 MMOL/L — SIGNIFICANT CHANGE UP (ref 5–17)
ANION GAP SERPL CALC-SCNC: 15 MMOL/L — SIGNIFICANT CHANGE UP (ref 5–17)
ANION GAP SERPL CALC-SCNC: 16 MMOL/L — SIGNIFICANT CHANGE UP (ref 5–17)
AST SERPL-CCNC: 15 U/L — SIGNIFICANT CHANGE UP
AST SERPL-CCNC: 15 U/L — SIGNIFICANT CHANGE UP
BASOPHILS # BLD AUTO: 0.02 K/UL — SIGNIFICANT CHANGE UP (ref 0–0.2)
BASOPHILS # BLD AUTO: 0.02 K/UL — SIGNIFICANT CHANGE UP (ref 0–0.2)
BASOPHILS NFR BLD AUTO: 0.2 % — SIGNIFICANT CHANGE UP (ref 0–2)
BASOPHILS NFR BLD AUTO: 0.2 % — SIGNIFICANT CHANGE UP (ref 0–2)
BILIRUB SERPL-MCNC: 0.4 MG/DL — SIGNIFICANT CHANGE UP (ref 0.4–2)
BILIRUB SERPL-MCNC: 0.4 MG/DL — SIGNIFICANT CHANGE UP (ref 0.4–2)
BUN SERPL-MCNC: 104 MG/DL — HIGH (ref 8–20)
BUN SERPL-MCNC: 104 MG/DL — HIGH (ref 8–20)
BUN SERPL-MCNC: 104.4 MG/DL — HIGH (ref 8–20)
BUN SERPL-MCNC: 104.4 MG/DL — HIGH (ref 8–20)
BUN SERPL-MCNC: 106 MG/DL — HIGH (ref 8–20)
BUN SERPL-MCNC: 106 MG/DL — HIGH (ref 8–20)
CALCIUM SERPL-MCNC: 7 MG/DL — LOW (ref 8.4–10.5)
CALCIUM SERPL-MCNC: 7 MG/DL — LOW (ref 8.4–10.5)
CALCIUM SERPL-MCNC: 7.1 MG/DL — LOW (ref 8.4–10.5)
CHLORIDE SERPL-SCNC: 105 MMOL/L — SIGNIFICANT CHANGE UP (ref 96–108)
CHLORIDE SERPL-SCNC: 105 MMOL/L — SIGNIFICANT CHANGE UP (ref 96–108)
CHLORIDE SERPL-SCNC: 107 MMOL/L — SIGNIFICANT CHANGE UP (ref 96–108)
CHOLEST SERPL-MCNC: 56 MG/DL — SIGNIFICANT CHANGE UP
CHOLEST SERPL-MCNC: 56 MG/DL — SIGNIFICANT CHANGE UP
CO2 SERPL-SCNC: 22 MMOL/L — SIGNIFICANT CHANGE UP (ref 22–29)
CO2 SERPL-SCNC: 23 MMOL/L — SIGNIFICANT CHANGE UP (ref 22–29)
CO2 SERPL-SCNC: 23 MMOL/L — SIGNIFICANT CHANGE UP (ref 22–29)
CREAT SERPL-MCNC: 3.5 MG/DL — HIGH (ref 0.5–1.3)
CREAT SERPL-MCNC: 3.5 MG/DL — HIGH (ref 0.5–1.3)
CREAT SERPL-MCNC: 3.64 MG/DL — HIGH (ref 0.5–1.3)
CREAT SERPL-MCNC: 3.64 MG/DL — HIGH (ref 0.5–1.3)
CREAT SERPL-MCNC: 3.71 MG/DL — HIGH (ref 0.5–1.3)
CREAT SERPL-MCNC: 3.71 MG/DL — HIGH (ref 0.5–1.3)
EGFR: 16 ML/MIN/1.73M2 — LOW
EGFR: 17 ML/MIN/1.73M2 — LOW
EGFR: 17 ML/MIN/1.73M2 — LOW
EOSINOPHIL # BLD AUTO: 0.12 K/UL — SIGNIFICANT CHANGE UP (ref 0–0.5)
EOSINOPHIL # BLD AUTO: 0.12 K/UL — SIGNIFICANT CHANGE UP (ref 0–0.5)
EOSINOPHIL NFR BLD AUTO: 1.2 % — SIGNIFICANT CHANGE UP (ref 0–6)
EOSINOPHIL NFR BLD AUTO: 1.2 % — SIGNIFICANT CHANGE UP (ref 0–6)
ESTIMATED AVERAGE GLUCOSE: 131 MG/DL — HIGH (ref 68–114)
ESTIMATED AVERAGE GLUCOSE: 131 MG/DL — HIGH (ref 68–114)
GLUCOSE SERPL-MCNC: 108 MG/DL — HIGH (ref 70–99)
GLUCOSE SERPL-MCNC: 108 MG/DL — HIGH (ref 70–99)
GLUCOSE SERPL-MCNC: 111 MG/DL — HIGH (ref 70–99)
GLUCOSE SERPL-MCNC: 111 MG/DL — HIGH (ref 70–99)
GLUCOSE SERPL-MCNC: 135 MG/DL — HIGH (ref 70–99)
GLUCOSE SERPL-MCNC: 135 MG/DL — HIGH (ref 70–99)
GRAM STN FLD: SIGNIFICANT CHANGE UP
GRAM STN FLD: SIGNIFICANT CHANGE UP
HCT VFR BLD CALC: 30.6 % — LOW (ref 39–50)
HCT VFR BLD CALC: 30.6 % — LOW (ref 39–50)
HDLC SERPL-MCNC: 21 MG/DL — LOW
HDLC SERPL-MCNC: 21 MG/DL — LOW
HGB BLD-MCNC: 8.7 G/DL — LOW (ref 13–17)
HGB BLD-MCNC: 8.7 G/DL — LOW (ref 13–17)
IMM GRANULOCYTES NFR BLD AUTO: 0.6 % — SIGNIFICANT CHANGE UP (ref 0–0.9)
IMM GRANULOCYTES NFR BLD AUTO: 0.6 % — SIGNIFICANT CHANGE UP (ref 0–0.9)
LIPID PNL WITH DIRECT LDL SERPL: 10 MG/DL — SIGNIFICANT CHANGE UP
LIPID PNL WITH DIRECT LDL SERPL: 10 MG/DL — SIGNIFICANT CHANGE UP
LYMPHOCYTES # BLD AUTO: 0.54 K/UL — LOW (ref 1–3.3)
LYMPHOCYTES # BLD AUTO: 0.54 K/UL — LOW (ref 1–3.3)
LYMPHOCYTES # BLD AUTO: 5.3 % — LOW (ref 13–44)
LYMPHOCYTES # BLD AUTO: 5.3 % — LOW (ref 13–44)
MAGNESIUM SERPL-MCNC: 2 MG/DL — SIGNIFICANT CHANGE UP (ref 1.6–2.6)
MCHC RBC-ENTMCNC: 24.6 PG — LOW (ref 27–34)
MCHC RBC-ENTMCNC: 24.6 PG — LOW (ref 27–34)
MCHC RBC-ENTMCNC: 28.4 GM/DL — LOW (ref 32–36)
MCHC RBC-ENTMCNC: 28.4 GM/DL — LOW (ref 32–36)
MCV RBC AUTO: 86.7 FL — SIGNIFICANT CHANGE UP (ref 80–100)
MCV RBC AUTO: 86.7 FL — SIGNIFICANT CHANGE UP (ref 80–100)
METHOD TYPE: SIGNIFICANT CHANGE UP
METHOD TYPE: SIGNIFICANT CHANGE UP
MONOCYTES # BLD AUTO: 1.17 K/UL — HIGH (ref 0–0.9)
MONOCYTES # BLD AUTO: 1.17 K/UL — HIGH (ref 0–0.9)
MONOCYTES NFR BLD AUTO: 11.4 % — SIGNIFICANT CHANGE UP (ref 2–14)
MONOCYTES NFR BLD AUTO: 11.4 % — SIGNIFICANT CHANGE UP (ref 2–14)
NEUTROPHILS # BLD AUTO: 8.32 K/UL — HIGH (ref 1.8–7.4)
NEUTROPHILS # BLD AUTO: 8.32 K/UL — HIGH (ref 1.8–7.4)
NEUTROPHILS NFR BLD AUTO: 81.3 % — HIGH (ref 43–77)
NEUTROPHILS NFR BLD AUTO: 81.3 % — HIGH (ref 43–77)
NON HDL CHOLESTEROL: 35 MG/DL — SIGNIFICANT CHANGE UP
NON HDL CHOLESTEROL: 35 MG/DL — SIGNIFICANT CHANGE UP
PLATELET # BLD AUTO: 160 K/UL — SIGNIFICANT CHANGE UP (ref 150–400)
PLATELET # BLD AUTO: 160 K/UL — SIGNIFICANT CHANGE UP (ref 150–400)
POTASSIUM SERPL-MCNC: 3.6 MMOL/L — SIGNIFICANT CHANGE UP (ref 3.5–5.3)
POTASSIUM SERPL-MCNC: 3.7 MMOL/L — SIGNIFICANT CHANGE UP (ref 3.5–5.3)
POTASSIUM SERPL-MCNC: 3.7 MMOL/L — SIGNIFICANT CHANGE UP (ref 3.5–5.3)
POTASSIUM SERPL-SCNC: 3.6 MMOL/L — SIGNIFICANT CHANGE UP (ref 3.5–5.3)
POTASSIUM SERPL-SCNC: 3.7 MMOL/L — SIGNIFICANT CHANGE UP (ref 3.5–5.3)
POTASSIUM SERPL-SCNC: 3.7 MMOL/L — SIGNIFICANT CHANGE UP (ref 3.5–5.3)
PROT SERPL-MCNC: 4.4 G/DL — LOW (ref 6.6–8.7)
PROT SERPL-MCNC: 4.4 G/DL — LOW (ref 6.6–8.7)
RBC # BLD: 3.53 M/UL — LOW (ref 4.2–5.8)
RBC # BLD: 3.53 M/UL — LOW (ref 4.2–5.8)
RBC # FLD: 18.3 % — HIGH (ref 10.3–14.5)
RBC # FLD: 18.3 % — HIGH (ref 10.3–14.5)
SODIUM SERPL-SCNC: 141 MMOL/L — SIGNIFICANT CHANGE UP (ref 135–145)
SODIUM SERPL-SCNC: 141 MMOL/L — SIGNIFICANT CHANGE UP (ref 135–145)
SODIUM SERPL-SCNC: 145 MMOL/L — SIGNIFICANT CHANGE UP (ref 135–145)
SPECIMEN SOURCE: SIGNIFICANT CHANGE UP
SPECIMEN SOURCE: SIGNIFICANT CHANGE UP
TRIGL SERPL-MCNC: 125 MG/DL — SIGNIFICANT CHANGE UP
TRIGL SERPL-MCNC: 125 MG/DL — SIGNIFICANT CHANGE UP
WBC # BLD: 10.23 K/UL — SIGNIFICANT CHANGE UP (ref 3.8–10.5)
WBC # BLD: 10.23 K/UL — SIGNIFICANT CHANGE UP (ref 3.8–10.5)
WBC # FLD AUTO: 10.23 K/UL — SIGNIFICANT CHANGE UP (ref 3.8–10.5)
WBC # FLD AUTO: 10.23 K/UL — SIGNIFICANT CHANGE UP (ref 3.8–10.5)

## 2023-10-22 PROCEDURE — 99233 SBSQ HOSP IP/OBS HIGH 50: CPT

## 2023-10-22 PROCEDURE — 93306 TTE W/DOPPLER COMPLETE: CPT | Mod: 26

## 2023-10-22 PROCEDURE — 93010 ELECTROCARDIOGRAM REPORT: CPT

## 2023-10-22 RX ORDER — ALBUMIN HUMAN 25 %
50 VIAL (ML) INTRAVENOUS ONCE
Refills: 0 | Status: COMPLETED | OUTPATIENT
Start: 2023-10-22 | End: 2023-10-22

## 2023-10-22 RX ORDER — POTASSIUM CHLORIDE 20 MEQ
40 PACKET (EA) ORAL EVERY 4 HOURS
Refills: 0 | Status: COMPLETED | OUTPATIENT
Start: 2023-10-22 | End: 2023-10-22

## 2023-10-22 RX ORDER — METRONIDAZOLE 500 MG
500 TABLET ORAL EVERY 8 HOURS
Refills: 0 | Status: DISCONTINUED | OUTPATIENT
Start: 2023-10-22 | End: 2023-10-22

## 2023-10-22 RX ORDER — METRONIDAZOLE 500 MG
500 TABLET ORAL EVERY 8 HOURS
Refills: 0 | Status: COMPLETED | OUTPATIENT
Start: 2023-10-22 | End: 2023-10-27

## 2023-10-22 RX ADMIN — Medication 20 MILLIGRAM(S): at 14:10

## 2023-10-22 RX ADMIN — Medication 40 MILLIEQUIVALENT(S): at 11:52

## 2023-10-22 RX ADMIN — Medication 50 MILLILITER(S): at 04:45

## 2023-10-22 RX ADMIN — Medication 50 MICROGRAM(S): at 06:18

## 2023-10-22 RX ADMIN — Medication 20 MILLIGRAM(S): at 23:00

## 2023-10-22 RX ADMIN — HEPARIN SODIUM 5000 UNIT(S): 5000 INJECTION INTRAVENOUS; SUBCUTANEOUS at 14:10

## 2023-10-22 RX ADMIN — Medication 500 MILLIGRAM(S): at 14:10

## 2023-10-22 RX ADMIN — PANTOPRAZOLE SODIUM 40 MILLIGRAM(S): 20 TABLET, DELAYED RELEASE ORAL at 06:18

## 2023-10-22 RX ADMIN — Medication 3 MILLIGRAM(S): at 23:13

## 2023-10-22 RX ADMIN — HEPARIN SODIUM 5000 UNIT(S): 5000 INJECTION INTRAVENOUS; SUBCUTANEOUS at 23:01

## 2023-10-22 RX ADMIN — HEPARIN SODIUM 5000 UNIT(S): 5000 INJECTION INTRAVENOUS; SUBCUTANEOUS at 06:18

## 2023-10-22 RX ADMIN — ATORVASTATIN CALCIUM 20 MILLIGRAM(S): 80 TABLET, FILM COATED ORAL at 23:01

## 2023-10-22 RX ADMIN — BUMETANIDE 1 MILLIGRAM(S): 0.25 INJECTION INTRAMUSCULAR; INTRAVENOUS at 14:18

## 2023-10-22 RX ADMIN — Medication 40 MILLIEQUIVALENT(S): at 14:10

## 2023-10-22 RX ADMIN — Medication 500 MILLIGRAM(S): at 23:00

## 2023-10-22 NOTE — CONSULT NOTE ADULT - SUBJECTIVE AND OBJECTIVE BOX
[facility]                                   [address] [place] [address]                                                      PHONE: [phone]                                                         FAX: [phone]  [email or web address]  ---------------------------------------------------------------------------------------------------------------------------------    Reason for Consult: hypoxia  CVS: [facility], Pulm HTN: [place]  HPI:  LETICIA MCCLURE is an 78y Male PMHx HTN, HLD, neurogenic bladder, AFib not on AC due to GI bleed, MV repair 2004 at [facility] and JEANMARIE ligation, severe pulm HTN follwed at [facility] on sildenafil and Tyvaso, ILR, TAVR, [facility] PPM,   Chronic systolic/diastolic HF LVEF 45%, CKD pw mechanical fall without syncope. Pt also notes worsening LE edema and increased weight gain.     Right and Left Cath Dr [facility] 9/18/23 Scanned into [facility] EMR: normal cors except for mRCA 30%, no hemodynamic significant response to nitric oxide     PAST MEDICAL & SURGICAL HISTORY:  HTN (hypertension)      HLD (hyperlipidemia)      Bladder-neck obstruction      Pulmonary hypertension      Chronic renal insufficiency      History of cirrhosis of liver      Atrial fibrillation      Hypothyroidism      MARIMAR (obstructive sleep apnea)      S/P TAVR (transcatheter aortic valve replacement)      History of cholecystectomy      H/O hernia repair      History of mitral valve repair      Pacemaker          No Known Drug Allergies  adhesives (Other)      MEDICATIONS  (STANDING):  atorvastatin 20 milliGRAM(s) Oral at bedtime  buMETAnide Injectable 1 milliGRAM(s) IV Push two times a day  heparin   Injectable 5000 Unit(s) SubCutaneous every 8 hours  influenza  Vaccine (HIGH DOSE) 0.7 milliLiter(s) IntraMuscular once  levothyroxine 50 MICROGram(s) Oral daily  metolazone 5 milliGRAM(s) Oral daily  metoprolol succinate ER 75 milliGRAM(s) Oral daily  pantoprazole    Tablet 40 milliGRAM(s) Oral before breakfast  potassium chloride    Tablet ER 40 milliEquivalent(s) Oral every 4 hours  sildenafil (REVATIO) 20 milliGRAM(s) Oral three times a day  Tyvaso (Treprostinil) 16 MICROGram(s) 16 MICROGram(s) Inhalation four times a day    MEDICATIONS  (PRN):  acetaminophen     Tablet .. 650 milliGRAM(s) Oral every 6 hours PRN Temp greater or equal to 38C (100.4F), Mild Pain (1 - 3)  melatonin 3 milliGRAM(s) Oral at bedtime PRN Insomnia  ondansetron Injectable 4 milliGRAM(s) IV Push every 8 hours PRN Nausea and/or Vomiting      Social History:  Cigarettes:     no               Alchohol:     no            Illicit Drug Abuse:  no  FHx no SCD  ROS: Negative other than as mentioned in HPI.    Vital Signs Last 24 Hrs  T(C): 36.8 (22 Oct 2023 04:44), Max: 36.8 (22 Oct 2023 04:44)  T(F): 98.3 (22 Oct 2023 04:44), Max: 98.3 (22 Oct 2023 04:44)  HR: 73 (22 Oct 2023 06:50) (70 - 95)  BP: 93/46 (22 Oct 2023 06:50) (76/38 - 133/68)  BP(mean): 57 (22 Oct 2023 03:45) (57 - 57)  RR: 18 (22 Oct 2023 04:44) (16 - 18)  SpO2: 99% (22 Oct 2023 04:44) (90% - 100%)    Parameters below as of 22 Oct 2023 04:44  Patient On (Oxygen Delivery Method): nasal cannula  O2 Flow (L/min): 3    ICU Vital Signs Last 24 Hrs  LETICIA MCCLURE  I&O's Detail    I&O's Summary    Drug Dosing Weight  LETICIA MCCLURE      PHYSICAL EXAM:  General:  alert and cooperative.  HEENT: Head; normocephalic, atraumatic.  Eyes: Pupils reactive, cornea wnl.  Neck: Supple, no nodes adenopathy, no NVD or carotid bruit or thyromegaly.  CARDIOVASCULAR: Normal S1 and S2, No murmur, rub, gallop or lift.   LUNGS: decreased l breath sounds bilaterally.  ABDOMEN: Soft, nontender without mass or organomegaly. bowel sounds normoactive.  EXTREMITIES: 2+ edema to abdomen. Distal pulses wnl.   SKIN: warm and dry with normal turgor.  NEURO: Alert/oriented x 3/normal motor exam. No pathologic reflexes.    PSYCH: normal affect.        LABS:                        8.7    10.23 )-----------( 160      ( 22 Oct 2023 02:47 )             30.6     10-22    145  |  107  |  104.0<H>  ----------------------------<  108<H>  3.6   |  23.0  |  3.71<H>    Ca    7.0<L>      22 Oct 2023 02:47  Mg     2.0     10-22    TPro  4.4<L>  /  Alb  1.7<L>  /  TBili  0.4  /  DBili  x   /  AST  15  /  ALT  6   /  AlkPhos  56  10-22    LETICIA MCCLURE  CARDIAC MARKERS ( 21 Oct 2023 18:20 )  x     / 0.11 ng/mL / x     / x     / x      CARDIAC MARKERS ( 21 Oct 2023 14:30 )  x     / 0.12 ng/mL / 152 U/L / x     / 9.3 ng/mL      PT/INR - ( 21 Oct 2023 14:30 )   PT: 15.9 sec;   INR: 1.45 ratio         PTT - ( 21 Oct 2023 14:30 )  PTT:32.8 sec  Urinalysis Basic - ( 22 Oct 2023 02:47 )    Color: x / Appearance: x / SG: x / pH: x  Gluc: 108 mg/dL / Ketone: x  / Bili: x / Urobili: x   Blood: x / Protein: x / Nitrite: x   Leuk Esterase: x / RBC: x / WBC x   Sq Epi: x / Non Sq Epi: x / Bacteria: x        RADIOLOGY & ADDITIONAL STUDIES:    INTERPRETATION OF TELEMETRY (personally reviewed): NSVT 22 beats    ECG: Afib @ 86 RAD, RBBB PVCs    ECHO: pending       Assessment and Plan:  In summary, LETICIA MCCLURE is an 78y Male with past medical history significant for HTN, HLD, neurogenic bladder, AFib not on AC due to GI bleed, MV repair 2004 at [facility] and JEANMARIE ligation, severe pulm HTN follwed at [facility] on sildenafil and Tyvaso, ILR, TAVR, [facility] PPM,   Chronic systolic/diastolic HF LVEF 45%, CKD pw mechanical fall without syncope. Pt also notes worsening LE edema and increased weight gain.   Right and Left Cath [facility] 9/18/23 Scanned into [facility] EMR: normal cors except for mRCA 30%, no hemodynamic significant response to nitric oxide     Mechanical fall in the setting of acute on Chronic systolic/diastolic HF    1) Potassium repleted  2) NSVT likely as a result of volume overload  3) ECHO pending  4) Cw IV Bumex if BP tolerates. If not will need pressor/inotrope support in ICU  5) CHF team to be consulted in AM

## 2023-10-22 NOTE — CHART NOTE - NSCHARTNOTEFT_GEN_A_CORE
LATE ENTRY   Patient was seen and evaluated at bedside at 08:00AM 10/22/2023    INTERVAL HX: Called by RN for patient with unsustained episodes of vtach, 6 beats and then 7beats this AM with multiple PVCs noted as well.   Patient is a 78 year old male with PMH of severe pulmonary hypertension, atrial fibrillation, neurogenic bladder, hypothyroidism, hypertension, and gastroesophageal reflux; admitted for acute hypoxic respiratory failure  secondary to pulmonary hypertension and heart failure.   Patient was seen and evaluated at bedside with son present. Patient without any complaints. Patient noted that he is due for pacemaker interrogation by EP this week. Denies chest pain, palpitations, sob, dyspnea.   Patient and son also noted that patient was being treated for c. diff outpatient by urologist due to frequent loose stools (6-7x/day) without stool culture. Patient was on flagyl 500mg three times daily for ten days, completed three days, missed fourth day yesterday. Patient states that his diarrhea improved to 2-3x/day now and requesting to complete antibiotic. Denies fever, chills, abdominal pain, n/v.     VITALS  T(C): 36.4   HR: 74 irregular  BP: 108/53   RR: 18 unlabored   SpO2: 99% on 3L NC    PHYSICAL EXAM  CONSTITUTIONAL: Patient laying in bed comfortably, no apparent distress  RESP: No respiratory distress, no use of accessory muscles; CTA b/l, no WRR  CV: RRR, +S1S2    LABS     141  |  105  |  104.4  -------------------------<  135   10-22-23 @ 08:57  3.6  |  22.0  |  3.50    Ca      7.1     10-22-23 @ 08:57  Mg     2.0     10-22-23 @ 08:57    EKG- HR 85 afib, with three pvcs noted, no acute changes compared to previous EKG    A/P: 78 year old male with PMH of severe pulmonary hypertension, atrial fibrillation, neurogenic bladder, hypothyroidism, hypertension, and gastroesophageal reflux; admitted for acute hypoxic respiratory failure  secondary to pulmonary hypertension and heart failure. Patient seen today for unsustained episodes of vtach, VSS    unsustained episodes of vtach  -EKG without acute changes   -stat BMP, mag with potassium 3.6, supplemented, monitor in AM   -cardio recs appreciated     low temp  -warming blanket PRN less than 97 degrees F, notify provider less than 96     possible c.diff?  -flagyl ordered for 5days to complete abx course started at home    Discussed with patient, son, and RN. Patient and son demonstrate understanding of plan and all questions/concerns addressed   Will continue to monitor   RN to notify provider with any changes in patient status

## 2023-10-22 NOTE — CHART NOTE - NSCHARTNOTEFT_GEN_A_CORE
Monitor tech reports 22 beats VTACH  Pt V states he has a Ventricular pacemaker  Strip reviewed  V paced?  Pt states he always has low B/P  Pt denies CP SOB dizziness or any complaints at this time  Pt A+OX4 cooperative NAD  VSS B/P 88/40 P 80 R 18 PO99% 3 L NC T 97.5  Pt pleasant, appears comfortable  Breathing easy and unlabored  EKG  MG  BMP  Albumin 25% in 50 ml  EKG done A Fib with PVCs  Hemodynamically stable   Continue to monitor

## 2023-10-22 NOTE — PROGRESS NOTE ADULT - SUBJECTIVE AND OBJECTIVE BOX
LETICIA MCCLURE  ----------------------------------------  The patient was seen earlier at bedside. Patient with hypoxia. Noted some improvement in the dyspnea. Denied chest pain.    Vital Signs Last 24 Hrs  T(C): 36.8 (22 Oct 2023 11:16), Max: 36.8 (22 Oct 2023 04:44)  T(F): 98.2 (22 Oct 2023 11:16), Max: 98.3 (22 Oct 2023 04:44)  HR: 82 (22 Oct 2023 11:16) (70 - 95)  BP: 106/51 (22 Oct 2023 11:16) (76/38 - 133/68)  BP(mean): 57 (22 Oct 2023 03:45) (57 - 57)  RR: 18 (22 Oct 2023 11:16) (16 - 18)  SpO2: 99% (22 Oct 2023 11:16) (90% - 100%)    Parameters below as of 22 Oct 2023 11:16  Patient On (Oxygen Delivery Method): nasal cannula  O2 Flow (L/min): 3    PHYSICAL EXAMINATION:  ----------------------------------------  General appearance: No acute distress, Awake, Alert  HEENT: Normocephalic, Atraumatic, Conjunctiva clear, EOMI  Neck: Supple, No JVD, No tenderness  Lungs: No wheezes, No rales, Diminished breath sounds at the bases  Cardiovascular: S1S2, Regular rhythm  Abdomen: Soft, Nontender, Nondistended, No guarding/rebound, Positive bowel sounds  Extremities: No clubbing, No cyanosis, No calf tenderness, Lower extremity edema  Neuro: Strength equal bilaterally, No tremors  Psychiatric: Appropriate mood, Normal affect    LABORATORY STUDIES:  ----------------------------------------             8.7    10.23 )-----------( 160      ( 22 Oct 2023 02:47 )             30.6     10-22    141  |  105  |  104.4<H>  ----------------------------<  135<H>  3.6   |  22.0  |  3.50<H>    Ca    7.1<L>      22 Oct 2023 08:57  Mg     2.0     10-22    TPro  4.4<L>  /  Alb  1.7<L>  /  TBili  0.4  /  DBili  x   /  AST  15  /  ALT  6   /  AlkPhos  56  10-22    LIVER FUNCTIONS - ( 22 Oct 2023 02:47 )  Alb: 1.7 g/dL / Pro: 4.4 g/dL / ALK PHOS: 56 U/L / ALT: 6 U/L / AST: 15 U/L / GGT: x           PT/INR - ( 21 Oct 2023 14:30 )   PT: 15.9 sec;   INR: 1.45 ratio    PTT - ( 21 Oct 2023 14:30 )  PTT:32.8 sec    CARDIAC MARKERS ( 21 Oct 2023 18:20 )  x     / 0.11 ng/mL / x     / x     / x      CARDIAC MARKERS ( 21 Oct 2023 14:30 )  x     / 0.12 ng/mL / 152 U/L / x     / 9.3 ng/mL    Urinalysis Basic - ( 22 Oct 2023 08:57 )  Color: x / Appearance: x / SG: x / pH: x  Gluc: 135 mg/dL / Ketone: x  / Bili: x / Urobili: x   Blood: x / Protein: x / Nitrite: x   Leuk Esterase: x / RBC: x / WBC x   Sq Epi: x / Non Sq Epi: x / Bacteria: x    MEDICATIONS  (STANDING):  atorvastatin 20 milliGRAM(s) Oral at bedtime  buMETAnide Injectable 1 milliGRAM(s) IV Push two times a day  heparin   Injectable 5000 Unit(s) SubCutaneous every 8 hours  influenza  Vaccine (HIGH DOSE) 0.7 milliLiter(s) IntraMuscular once  levothyroxine 50 MICROGram(s) Oral daily  metolazone 5 milliGRAM(s) Oral daily  metoprolol succinate ER 75 milliGRAM(s) Oral daily  metroNIDAZOLE    Tablet 500 milliGRAM(s) Oral every 8 hours  pantoprazole    Tablet 40 milliGRAM(s) Oral before breakfast  potassium chloride    Tablet ER 40 milliEquivalent(s) Oral every 4 hours  sildenafil (REVATIO) 20 milliGRAM(s) Oral three times a day  Tyvaso (Treprostinil) 16 MICROGram(s) 16 MICROGram(s) Inhalation four times a day    MEDICATIONS  (PRN):  acetaminophen     Tablet .. 650 milliGRAM(s) Oral every 6 hours PRN Temp greater or equal to 38C (100.4F), Mild Pain (1 - 3)  melatonin 3 milliGRAM(s) Oral at bedtime PRN Insomnia  ondansetron Injectable 4 milliGRAM(s) IV Push every 8 hours PRN Nausea and/or Vomiting      ASSESSMENT / PLAN:  ----------------------------------------  78M with a history of severe pulmonary hypertension, atrial fibrillation, neurogenic bladder, hypothyroidism, hypertension, and gastroesophageal reflux who presented after episodes of falling at home and was found to have hypoxia.    Acute hypoxic respiratory failure  - Multifactorial with pulmonary hypertension and heart failure  - Continue on supplemental oxygen as need with titration as tolerated    Heart failure  - Elevated troponin and BNP levels  - Monitoring urine output and daily weights  - On bumetanide and metolazone  - Echocardiogram pending    Pulmonary hypertension  - On sildenafil and treprostinil    Chronic kidney disease with acute kidney injury  - Monitoring renal function on diuretic therapy  - Nephrology consultation pending    Neurogenic bladder  - Previously self-catheterizing  - Now with Jain catheter    Hypothyroidism  - On levothyroxine    Atrial fibrillation  - On metoprolol  - Not on anticoagulation at home    Fall  - Mechanical in nature  - No loss of consciousness  - Xray of the knee and hand were without fractures  - For Physical Therapy evaluation    Overall prognosis guarded

## 2023-10-22 NOTE — PROVIDER CONTACT NOTE (CRITICAL VALUE NOTIFICATION) - TEST AND RESULT REPORTED:
Blood Cultures rom 10/21 preliminary results growth in aerobic bottle with gram positive cocci in clusters

## 2023-10-23 DIAGNOSIS — I27.20 PULMONARY HYPERTENSION, UNSPECIFIED: ICD-10-CM

## 2023-10-23 DIAGNOSIS — I47.29 OTHER VENTRICULAR TACHYCARDIA: ICD-10-CM

## 2023-10-23 DIAGNOSIS — I48.91 UNSPECIFIED ATRIAL FIBRILLATION: ICD-10-CM

## 2023-10-23 DIAGNOSIS — N17.9 ACUTE KIDNEY FAILURE, UNSPECIFIED: ICD-10-CM

## 2023-10-23 DIAGNOSIS — I50.33 ACUTE ON CHRONIC DIASTOLIC (CONGESTIVE) HEART FAILURE: ICD-10-CM

## 2023-10-23 LAB
-  STAPHYLOCOCCUS EPIDERMIDIS, METHICILLIN RESISTANT: SIGNIFICANT CHANGE UP
-  STAPHYLOCOCCUS EPIDERMIDIS, METHICILLIN RESISTANT: SIGNIFICANT CHANGE UP
ANION GAP SERPL CALC-SCNC: 13 MMOL/L — SIGNIFICANT CHANGE UP (ref 5–17)
ANION GAP SERPL CALC-SCNC: 13 MMOL/L — SIGNIFICANT CHANGE UP (ref 5–17)
BUN SERPL-MCNC: 103.8 MG/DL — HIGH (ref 8–20)
BUN SERPL-MCNC: 103.8 MG/DL — HIGH (ref 8–20)
CALCIUM SERPL-MCNC: 7 MG/DL — LOW (ref 8.4–10.5)
CALCIUM SERPL-MCNC: 7 MG/DL — LOW (ref 8.4–10.5)
CHLORIDE SERPL-SCNC: 106 MMOL/L — SIGNIFICANT CHANGE UP (ref 96–108)
CHLORIDE SERPL-SCNC: 106 MMOL/L — SIGNIFICANT CHANGE UP (ref 96–108)
CO2 SERPL-SCNC: 23 MMOL/L — SIGNIFICANT CHANGE UP (ref 22–29)
CO2 SERPL-SCNC: 23 MMOL/L — SIGNIFICANT CHANGE UP (ref 22–29)
CREAT SERPL-MCNC: 3.85 MG/DL — HIGH (ref 0.5–1.3)
CREAT SERPL-MCNC: 3.85 MG/DL — HIGH (ref 0.5–1.3)
CULTURE RESULTS: SIGNIFICANT CHANGE UP
CULTURE RESULTS: SIGNIFICANT CHANGE UP
EGFR: 15 ML/MIN/1.73M2 — LOW
EGFR: 15 ML/MIN/1.73M2 — LOW
GLUCOSE SERPL-MCNC: 125 MG/DL — HIGH (ref 70–99)
GLUCOSE SERPL-MCNC: 125 MG/DL — HIGH (ref 70–99)
HCT VFR BLD CALC: 30.8 % — LOW (ref 39–50)
HCT VFR BLD CALC: 30.8 % — LOW (ref 39–50)
HGB BLD-MCNC: 9 G/DL — LOW (ref 13–17)
HGB BLD-MCNC: 9 G/DL — LOW (ref 13–17)
MAGNESIUM SERPL-MCNC: 1.9 MG/DL — SIGNIFICANT CHANGE UP (ref 1.6–2.6)
MAGNESIUM SERPL-MCNC: 1.9 MG/DL — SIGNIFICANT CHANGE UP (ref 1.6–2.6)
MCHC RBC-ENTMCNC: 25.2 PG — LOW (ref 27–34)
MCHC RBC-ENTMCNC: 25.2 PG — LOW (ref 27–34)
MCHC RBC-ENTMCNC: 29.2 GM/DL — LOW (ref 32–36)
MCHC RBC-ENTMCNC: 29.2 GM/DL — LOW (ref 32–36)
MCV RBC AUTO: 86.3 FL — SIGNIFICANT CHANGE UP (ref 80–100)
MCV RBC AUTO: 86.3 FL — SIGNIFICANT CHANGE UP (ref 80–100)
ORGANISM # SPEC MICROSCOPIC CNT: SIGNIFICANT CHANGE UP
PLATELET # BLD AUTO: 152 K/UL — SIGNIFICANT CHANGE UP (ref 150–400)
PLATELET # BLD AUTO: 152 K/UL — SIGNIFICANT CHANGE UP (ref 150–400)
POTASSIUM SERPL-MCNC: 4.1 MMOL/L — SIGNIFICANT CHANGE UP (ref 3.5–5.3)
POTASSIUM SERPL-MCNC: 4.1 MMOL/L — SIGNIFICANT CHANGE UP (ref 3.5–5.3)
POTASSIUM SERPL-SCNC: 4.1 MMOL/L — SIGNIFICANT CHANGE UP (ref 3.5–5.3)
POTASSIUM SERPL-SCNC: 4.1 MMOL/L — SIGNIFICANT CHANGE UP (ref 3.5–5.3)
RBC # BLD: 3.57 M/UL — LOW (ref 4.2–5.8)
RBC # BLD: 3.57 M/UL — LOW (ref 4.2–5.8)
RBC # FLD: 18.1 % — HIGH (ref 10.3–14.5)
RBC # FLD: 18.1 % — HIGH (ref 10.3–14.5)
SODIUM SERPL-SCNC: 142 MMOL/L — SIGNIFICANT CHANGE UP (ref 135–145)
SODIUM SERPL-SCNC: 142 MMOL/L — SIGNIFICANT CHANGE UP (ref 135–145)
SPECIMEN SOURCE: SIGNIFICANT CHANGE UP
SPECIMEN SOURCE: SIGNIFICANT CHANGE UP
TROPONIN T SERPL-MCNC: 0.11 NG/ML — HIGH (ref 0–0.06)
TROPONIN T SERPL-MCNC: 0.11 NG/ML — HIGH (ref 0–0.06)
WBC # BLD: 9.7 K/UL — SIGNIFICANT CHANGE UP (ref 3.8–10.5)
WBC # BLD: 9.7 K/UL — SIGNIFICANT CHANGE UP (ref 3.8–10.5)
WBC # FLD AUTO: 9.7 K/UL — SIGNIFICANT CHANGE UP (ref 3.8–10.5)
WBC # FLD AUTO: 9.7 K/UL — SIGNIFICANT CHANGE UP (ref 3.8–10.5)

## 2023-10-23 PROCEDURE — 99222 1ST HOSP IP/OBS MODERATE 55: CPT | Mod: FS

## 2023-10-23 PROCEDURE — 76775 US EXAM ABDO BACK WALL LIM: CPT | Mod: 26

## 2023-10-23 PROCEDURE — 99233 SBSQ HOSP IP/OBS HIGH 50: CPT

## 2023-10-23 RX ORDER — ERYTHROPOIETIN 10000 [IU]/ML
20000 INJECTION, SOLUTION INTRAVENOUS; SUBCUTANEOUS
Refills: 0 | Status: DISCONTINUED | OUTPATIENT
Start: 2023-10-23 | End: 2023-10-30

## 2023-10-23 RX ORDER — BUMETANIDE 0.25 MG/ML
2 INJECTION INTRAMUSCULAR; INTRAVENOUS EVERY 8 HOURS
Refills: 0 | Status: DISCONTINUED | OUTPATIENT
Start: 2023-10-23 | End: 2023-10-23

## 2023-10-23 RX ORDER — FOLIC ACID 0.8 MG
1 TABLET ORAL DAILY
Refills: 0 | Status: DISCONTINUED | OUTPATIENT
Start: 2023-10-23 | End: 2023-10-30

## 2023-10-23 RX ORDER — BUMETANIDE 0.25 MG/ML
1 INJECTION INTRAMUSCULAR; INTRAVENOUS
Refills: 0 | Status: DISCONTINUED | OUTPATIENT
Start: 2023-10-23 | End: 2023-10-23

## 2023-10-23 RX ORDER — BUMETANIDE 0.25 MG/ML
2 INJECTION INTRAMUSCULAR; INTRAVENOUS ONCE
Refills: 0 | Status: COMPLETED | OUTPATIENT
Start: 2023-10-23 | End: 2023-10-23

## 2023-10-23 RX ORDER — BUMETANIDE 0.25 MG/ML
2 INJECTION INTRAMUSCULAR; INTRAVENOUS EVERY 8 HOURS
Refills: 0 | Status: DISCONTINUED | OUTPATIENT
Start: 2023-10-24 | End: 2023-10-27

## 2023-10-23 RX ADMIN — Medication 20 MILLIGRAM(S): at 21:56

## 2023-10-23 RX ADMIN — Medication 500 MILLIGRAM(S): at 06:25

## 2023-10-23 RX ADMIN — Medication 500 MILLIGRAM(S): at 21:56

## 2023-10-23 RX ADMIN — Medication 500 MILLIGRAM(S): at 13:02

## 2023-10-23 RX ADMIN — PANTOPRAZOLE SODIUM 40 MILLIGRAM(S): 20 TABLET, DELAYED RELEASE ORAL at 06:25

## 2023-10-23 RX ADMIN — BUMETANIDE 1 MILLIGRAM(S): 0.25 INJECTION INTRAMUSCULAR; INTRAVENOUS at 13:39

## 2023-10-23 RX ADMIN — HEPARIN SODIUM 5000 UNIT(S): 5000 INJECTION INTRAVENOUS; SUBCUTANEOUS at 06:26

## 2023-10-23 RX ADMIN — BUMETANIDE 2 MILLIGRAM(S): 0.25 INJECTION INTRAMUSCULAR; INTRAVENOUS at 18:41

## 2023-10-23 RX ADMIN — HEPARIN SODIUM 5000 UNIT(S): 5000 INJECTION INTRAVENOUS; SUBCUTANEOUS at 13:02

## 2023-10-23 RX ADMIN — Medication 1 APPLICATION(S): at 18:39

## 2023-10-23 RX ADMIN — ATORVASTATIN CALCIUM 20 MILLIGRAM(S): 80 TABLET, FILM COATED ORAL at 21:56

## 2023-10-23 RX ADMIN — Medication 50 MICROGRAM(S): at 06:25

## 2023-10-23 RX ADMIN — Medication 75 MILLIGRAM(S): at 06:26

## 2023-10-23 RX ADMIN — Medication 20 MILLIGRAM(S): at 14:09

## 2023-10-23 RX ADMIN — HEPARIN SODIUM 5000 UNIT(S): 5000 INJECTION INTRAVENOUS; SUBCUTANEOUS at 21:56

## 2023-10-23 RX ADMIN — Medication 20 MILLIGRAM(S): at 06:45

## 2023-10-23 RX ADMIN — BUMETANIDE 1 MILLIGRAM(S): 0.25 INJECTION INTRAMUSCULAR; INTRAVENOUS at 06:25

## 2023-10-23 NOTE — CONSULT NOTE ADULT - NS ATTEND AMEND GEN_ALL_CORE FT
79 y/o male with h/o PAH on sildenafil/Tyvaso, RV dysfunction (follow at [facility]), s/p TAVR ([facility]), MV repair+JEANMARIE ligation ’04 ([facility]), nonobstructive CAD, s/p PPM, AF not on AC due to GI bleed, HTN, DLP, CKD stage 3, ?cirrhosis/congestive hepatopathy and neurogenic bladder requiring daily catheterizations who was admitted s/p fall. As per the pt, he felt LE weakness and fell. He’s had multiple prior episodes. He denies prodromal symptoms such as dizziness, lightheadedness, chest pain, palpitations or SOB. There is no report of LOC. His ED course was remarkable for hypoxia and hypotension requiring O2. His admission labs were remarkable for WBC 11.83, H/H 10/34.6, Plat 227, INR 1.45, .1, Creatinine 3.17, albumin 2.1, Trop 0.12-0.11-0.11 and pBNP 15.8K (7.2K in February 2023).   He was admitted and started on his home diuretic regimen. However, he had TIERRA on CKD and HF was consulted fur further management. As per the pt, he follows at [facility] for his PAH. His tyvaso was started `1 week prior to admission. He’s also been following with Cardiology for his pacemaker management.   He appears hypervolemic on the physical exam with JVD up to ear lobe, irregularly irregular heart sounds, 2-3/6 holosyst murmur @LLSB, decreased BS at the bases and LE edema with lukewarm extremities.   Cardiac testing review:  10/22/23 TTE: LVIDd 4.62cm, LVEF 50-55%, severe RVE with mod RV dysfunction, normal LA size, severe SONAM, trace MR, mod-severe TR, TAVR with normal gradients, trivial paravalvular leak, est RAP 15 mmHg, est PASP 53.7 mmHg.    9/18/23 R/LHC with Dr Fernandez at [facility] (report not available): normal cors except for mRCA 30%, no hemodynamic significant response to nitric oxide.    10/21/23 EKG: AF. V-pacing and frequent PVCs.     In summary, this is a 79 y/o male with EXTENSIVE pmh as above remarkable for PAH on vasodilators, RV failure, /p TAVR ([facility]), MV repair+JEANMARIE ligation ’04 ([facility]), nonobstructive CAD, s/p PPM, AF not on AC due to GI bleed, HTN, DLP, CKD stage 3, ?cirrhosis/congestive hepatopathy and neurogenic bladder requiring daily catheterizations who was admitted s/p fall and was found to have ADHF associated to TIERRA on cKD. He would benefit from aggressive diuresis, physical therapy and continuation of vasodilator therapy. His telemetry demonstrates frequent PVCs. As per the pt, his BB was recently increased.   Recommendations:  - Strict I/O, daily standing weights (if possible)  - Diuretics: increase bumex to 2mg IV TID. Give extra 2mg this evening. Continue metolazone 5mg daily (home dose). May need albumin to mobilize fluid, will continue to monitor  - Close monitoring of electrolytes, Keep K > 4 and Mg > 2  - Vasodilators: sildenafil and Tyvaso as instructed  - Consider EP consult/Will defer to [facility] team given frequent PVCs  - Physical therapy c/s  -We will continue to follow with you

## 2023-10-23 NOTE — PROGRESS NOTE ADULT - SUBJECTIVE AND OBJECTIVE BOX
[facility]                                   [address] [place] [address]                                                      PHONE: [phone]                                                         FAX: [phone]  [email or web address]  ---------------------------------------------------------------------------------------------------------------------------------    Overnight events/patient complaints:  Patient feeling well.  No chest pain or dyspnea.     PAST MEDICAL & SURGICAL HISTORY:  HTN (hypertension)      HLD (hyperlipidemia)      Bladder-neck obstruction      Pulmonary hypertension      Chronic renal insufficiency      History of cirrhosis of liver      Atrial fibrillation      Hypothyroidism      MARIMAR (obstructive sleep apnea)      S/P TAVR (transcatheter aortic valve replacement)      History of cholecystectomy      H/O hernia repair      History of mitral valve repair      Pacemaker          No Known Drug Allergies  adhesives (Other)    MEDICATIONS  (STANDING):  atorvastatin 20 milliGRAM(s) Oral at bedtime  buMETAnide Injectable 1 milliGRAM(s) IV Push two times a day  heparin   Injectable 5000 Unit(s) SubCutaneous every 8 hours  influenza  Vaccine (HIGH DOSE) 0.7 milliLiter(s) IntraMuscular once  levothyroxine 50 MICROGram(s) Oral daily  metolazone 5 milliGRAM(s) Oral daily  metoprolol succinate ER 75 milliGRAM(s) Oral daily  metroNIDAZOLE    Tablet 500 milliGRAM(s) Oral every 8 hours  pantoprazole    Tablet 40 milliGRAM(s) Oral before breakfast  sildenafil (REVATIO) 20 milliGRAM(s) Oral three times a day  Tyvaso (Treprostinil) 16 MICROGram(s) 16 MICROGram(s) Inhalation four times a day    MEDICATIONS  (PRN):  acetaminophen     Tablet .. 650 milliGRAM(s) Oral every 6 hours PRN Temp greater or equal to 38C (100.4F), Mild Pain (1 - 3)  melatonin 3 milliGRAM(s) Oral at bedtime PRN Insomnia  ondansetron Injectable 4 milliGRAM(s) IV Push every 8 hours PRN Nausea and/or Vomiting      Vital Signs Last 24 Hrs  T(C): 36.6 (23 Oct 2023 09:31), Max: 37 (22 Oct 2023 18:01)  T(F): 97.8 (23 Oct 2023 09:31), Max: 98.6 (22 Oct 2023 18:01)  HR: 67 (23 Oct 2023 09:31) (67 - 98)  BP: 100/59 (23 Oct 2023 09:31) (100/52 - 139/52)  BP(mean): 71 (22 Oct 2023 15:25) (71 - 71)  RR: 17 (23 Oct 2023 09:31) (17 - 18)  SpO2: 95% (23 Oct 2023 09:31) (95% - 99%)    Parameters below as of 23 Oct 2023 09:31  Patient On (Oxygen Delivery Method): nasal cannula  O2 Flow (L/min): 1    ICU Vital Signs Last 24 Hrs  LETICIA MCCLURE  I&O's Detail    22 Oct 2023 07:01  -  23 Oct 2023 07:00  --------------------------------------------------------  IN:  Total IN: 0 mL    OUT:    Voided (mL): 1050 mL  Total OUT: 1050 mL    Total NET: -1050 mL        I&O's Summary    22 Oct 2023 07:01  -  23 Oct 2023 07:00  --------------------------------------------------------  IN: 0 mL / OUT: 1050 mL / NET: -1050 mL      Drug Dosing Weight  LETICIA MCCLURE      PHYSICAL EXAM:  General: Appears well developed, alert and cooperative.  HEENT: Head; normocephalic, atraumatic.  Eyes: Pupils reactive, cornea wnl.  Neck: Supple, no nodes adenopathy, no JVD, no carotid bruit  CARDIOVASCULAR: Normal S1 and S2, No murmur, rub, gallop or lift.   LUNGS: No rales, rhonchi or wheeze. Normal breath sounds bilaterally.  ABDOMEN: Soft, nontender, nondistended  EXTREMITIES: Minimal LE edema; +saddle edema  SKIN: warm and dry with normal turgor.  NEURO: Alert/oriented x 3/normal motor exam.   PSYCH: normal affect.        LABS:                        9.0    9.70  )-----------( 152      ( 23 Oct 2023 05:54 )             30.8     10-23    142  |  106  |  103.8<H>  ----------------------------<  125<H>  4.1   |  23.0  |  3.85<H>    Ca    7.0<L>      23 Oct 2023 05:54  Mg     1.9     10-23    TPro  4.4<L>  /  Alb  1.7<L>  /  TBili  0.4  /  DBili  x   /  AST  15  /  ALT  6   /  AlkPhos  56  10-22    LETICIA MCCLURE  CARDIAC MARKERS ( 23 Oct 2023 05:54 )  x     / 0.11 ng/mL / x     / x     / x      CARDIAC MARKERS ( 21 Oct 2023 18:20 )  x     / 0.11 ng/mL / x     / x     / x      CARDIAC MARKERS ( 21 Oct 2023 14:30 )  x     / 0.12 ng/mL / 152 U/L / x     / 9.3 ng/mL      PT/INR - ( 21 Oct 2023 14:30 )   PT: 15.9 sec;   INR: 1.45 ratio         PTT - ( 21 Oct 2023 14:30 )  PTT:32.8 sec  Urinalysis Basic - ( 23 Oct 2023 05:54 )    Color: x / Appearance: x / SG: x / pH: x  Gluc: 125 mg/dL / Ketone: x  / Bili: x / Urobili: x   Blood: x / Protein: x / Nitrite: x   Leuk Esterase: x / RBC: x / WBC x   Sq Epi: x / Non Sq Epi: x / Bacteria: x        RADIOLOGY & ADDITIONAL STUDIES:    INTERPRETATION OF TELEMETRY (personally reviewed):  PVCs, paced rhythm    ECG:    ECHO:Summary:   1. Endocardial visualization was enhanced with intravenous echo contrast.   2. Left ventricular ejection fraction, by visual estimation, is 50 to   55%.   3. Normal global left ventricular systolic function.   4. Severely enlarged right atrium.   5. The mitral in-flow pattern reveals no discernable A-wave, therefore   no comment on diastolic function canbe made.   6. There is mild concentric left ventricular hypertrophy.   7. Severely enlarged right ventricle.   8. Moderately reduced RV systolic function.   9. Trace mitral valve regurgitation.  10. Moderate-severe tricuspid regurgitation.  11. TAVR in the aortic position.  12. Trivial paravalvular leak.  13. Peak aortic valve gradient is 18.5 mmHg and the mean gradient is 7.1   mmHg, which is probably normal in the setting of a prosthetic aortic   valve.      STRESS TEST:    CARDIAC CATHETERIZATION:    ASSESSMENT AND PLAN:  In summary, LETICIA MCCLURE is an 78y Male with past medical history significant for HTN, HLD, neurogenic bladder, afib not on AC due to GI bleed, MV repair, JEANMARIE ligation, severe pulmonary HTN, TAVR, [facility] PPM, LVEF 50-55%, CKD p/w mechanical fall found to have worsening right heart failure.    Mechanical fall/right heart failure    - would switch IV bumex to home dose of 1mg BID    HLD- cotninue atorvastatin    pulmonary htn- continue dildenafil and tyvaso    Can consider CHF consult today        Thank you for allowing [facility] to participate in the care of this patient.  Please feel free to call with any questions or concerns.

## 2023-10-23 NOTE — CONSULT NOTE ADULT - ASSESSMENT
Initial HF c/s: June De La Torre MD    79 y/o M with a history of RV dysfunction in the setting of severe PH (followed at [facility] on sildenafil and Tyvaso), MV repair and JEANMARIE ligation in 2004 at [facility], TAVR, PPM, AFib not on AC due to GI bleed, HTN, HLD, CKD (b/l Cr 2.5-3), and neurogenic bladder, who presented after a fall at home.     Patient reports that he lives alone and was walking from one room to another and then tripped and fell. Reports that he laid on the floor for 3 hours and then his son and daughter in law presented to pick him up. Then patient was ambulating and again his legs gave out and he fell. This time he asked his son to take him to the hospital for an evaluation. While in the ED patient was noted to be hypoxic into the Low 80s. Patient was placed on oxygen with noted improvement. Pro-BNP was 15,886 on admission. He was placed on intermittent IV Bumex and was noted to have worsening renal function. HF consulted for further management.     Cardiac Studies:  10/22/23 TTE: LVIDd 4.62cm, LVEF 50-55%, severe RVE with mod RV dysfunction, normal LA size, severe SONAM, trace MR, mod-severe TR, TAVR with normal gradients, trivial paravalvular leak, est RAP 15 mmHg, est PASP 53.7 mmHg.    R/LHC with Dr Fernandez at [facility] 9/18/23 (report not available): normal cors except for mRCA 30%, no hemodynamic significant response to nitric oxide Initial HF c/s: June De La Torre MD    79 y/o M with a history of RV dysfunction in the setting of severe PH (followed by Dr. Kandis Singh at [place] on sildenafil and recently started on Tyvaso), MV repair and JEANMARIE ligation in 2004 at [facility], recent TAVR at [place], s/p PPM, AFib not on AC due to GI bleed, HTN, HLD, CKD (b/l Cr 2.5-3), and neurogenic bladder, who presented after a fall at home.     While in the ED patient was noted to be hypotensive and hypoxic into the Low 80s. Patient was placed on oxygen with noted improvement. Pro-BNP was 15,886 on admission. He has been diuresing on intermittent IV Bumex and was noted to have worsening renal function. HF consulted for further management.     Cardiac Studies:  10/22/23 TTE: LVIDd 4.62cm, LVEF 50-55%, severe RVE with mod RV dysfunction, normal LA size, severe SONAM, trace MR, mod-severe TR, TAVR with normal gradients, trivial paravalvular leak, est RAP 15 mmHg, est PASP 53.7 mmHg.    R/LHC with Dr Fernandez at [facility] 9/18/23 (report not available): normal cors except for mRCA 30%, no hemodynamic significant response to nitric oxide Initial HF c/s: June De La Torre MD  Outpt PAH expert: Kandis Zee MD    79 y/o M with a history of RV dysfunction in the setting of severe PH (followed by Dr. Kandis Singh at [place] on sildenafil and recently started on Tyvaso), MV repair and JEANMARIE ligation in 2004 at [facility], recent TAVR at [place], s/p PPM, AFib not on AC due to GI bleed, HTN, HLD, CKD (b/l Cr 2.5-3), and neurogenic bladder, who presented after a fall at home.     While in the ED patient was noted to be hypotensive and hypoxic into the Low 80s. Patient was placed on oxygen with noted improvement. Pro-BNP was 15,886 on admission. He has been diuresing on intermittent IV Bumex and was noted to have worsening renal function. HF consulted for further management.     Cardiac Studies:  10/22/23 TTE: LVIDd 4.62cm, LVEF 50-55%, severe RVE with mod RV dysfunction, normal LA size, severe SONAM, trace MR, mod-severe TR, TAVR with normal gradients, trivial paravalvular leak, est RAP 15 mmHg, est PASP 53.7 mmHg.    R/LHC with Dr Fernandez at [facility] 9/18/23 (report not available): normal cors except for mRCA 30%, no hemodynamic significant response to nitric oxide

## 2023-10-23 NOTE — CONSULT NOTE ADULT - PROBLEM SELECTOR RECOMMENDATION 9
- HFpEF with RV dysfunction  - Clinically euvolemic with warm extremities  - GDMT: Would not start SGLT2i given neurogenic bladder and need to self cath (being treated for UTI with PO Flagyl).  - Diuretics: Switched to Bumex 1 mg PO BID today and metolazone 5 mg daily  - Strict I/Os - HFpEF with RV dysfunction  - Clinically euvolemic with warm extremities  - GDMT: Limited by renal dysfunction. Would not start SGLT2i given eGFR <25 and neurogenic bladder with the need to self cath (being treated for UTI with PO Flagyl).  - Diuretics: Switched to Bumex 1 mg PO BID today and metolazone 5 mg daily  - Strict I/Os - HFpEF with RV dysfunction  - Clinically volume overloaded with lukewarm extremities  - GDMT: Limited by renal dysfunction. Would not start SGLT2i given eGFR <25 and neurogenic bladder with the need to self cath (being treated for UTI with PO Flagyl).  - Diuretics: Would switch back to IV Bumex 2 mg TID and continue metolazone 5 mg daily (if BUN continues to rise, will stop)  - Low threshold to start milrinone 0.125 mcg/kg/min for RV failure to help augment diuresis  - Strict I/Os - HFpEF with RV dysfunction  - Clinically volume overloaded with lukewarm extremities  - GDMT: Limited by renal dysfunction. Would not start SGLT2i given eGFR <25 and neurogenic bladder with the need to self cath (being treated for UTI with PO Flagyl).  - Diuretics: Would switch back to IV Bumex 2 mg TID and continue metolazone 5 mg daily (if BUN continues to rise, will stop)  - Low threshold to start milrinone 0.125 mcg/kg/min for RV failure to help augment diuresis  - Strict I/Os, daily standing wieghts

## 2023-10-23 NOTE — DIETITIAN INITIAL EVALUATION ADULT - PERTINENT LABORATORY DATA
10-23    142  |  106  |  103.8<H>  ----------------------------<  125<H>  4.1   |  23.0  |  3.85<H>    Ca    7.0<L>      23 Oct 2023 05:54  Mg     1.9     10-23    TPro  4.4<L>  /  Alb  1.7<L>  /  TBili  0.4  /  DBili  x   /  AST  15  /  ALT  6   /  AlkPhos  56  10-22  A1C with Estimated Average Glucose Result: 6.2 % (10-22-23 @ 02:47)  
Vaginal delivery

## 2023-10-23 NOTE — DIETITIAN INITIAL EVALUATION ADULT - NSFNSPHYEXAMSKINFT_GEN_A_CORE
Pressure Injury 1: Left:, buttocks, Stage II  Pressure Injury 2: none, none  Pressure Injury 3: none, none  Pressure Injury 4: none, none  Pressure Injury 5: none, none  Pressure Injury 6: none, none  Pressure Injury 7: none, none  Pressure Injury 8: none, none  Pressure Injury 9: none, none  Pressure Injury 10: none, none  Pressure Injury 11: none, none, Pressure Injury 1: Left:, buttocks, Stage II  Pressure Injury 2: none, none  Pressure Injury 3: none, none  Pressure Injury 4: none, none  Pressure Injury 5: none, none  Pressure Injury 6: none, none  Pressure Injury 7: none, none  Pressure Injury 8: none, none  Pressure Injury 9: none, none  Pressure Injury 10: none, none  Pressure Injury 11: none, none, Pressure Injury 1: Left:, buttocks, Stage II  Pressure Injury 2: none, none  Pressure Injury 3: none, none  Pressure Injury 4: none, none  Pressure Injury 5: none, none  Pressure Injury 6: none, none  Pressure Injury 7: none, none  Pressure Injury 8: none, none  Pressure Injury 9: none, none  Pressure Injury 10: none, none  Pressure Injury 11: none, none

## 2023-10-23 NOTE — PROGRESS NOTE ADULT - SUBJECTIVE AND OBJECTIVE BOX
LETICIA MCCLURE  ----------------------------------------  The patient was seen earlier at bedside. Patient with respiratory failure. Noted some improvement in the dyspnea.    Vital Signs Last 24 Hrs  T(C): 36.6 (23 Oct 2023 13:35), Max: 37 (22 Oct 2023 18:01)  T(F): 97.9 (23 Oct 2023 13:35), Max: 98.6 (22 Oct 2023 18:01)  HR: 79 (23 Oct 2023 13:35) (67 - 98)  BP: 113/56 (23 Oct 2023 13:35) (100/52 - 139/52)  BP(mean): 71 (22 Oct 2023 15:25) (71 - 71)  RR: 18 (23 Oct 2023 13:35) (17 - 18)  SpO2: 95% (23 Oct 2023 13:35) (95% - 99%)    Parameters below as of 23 Oct 2023 13:35  Patient On (Oxygen Delivery Method): nasal cannula  O2 Flow (L/min): 1    PHYSICAL EXAMINATION:  ----------------------------------------  General appearance: No acute distress, Awake, Alert  HEENT: Normocephalic, Atraumatic, Conjunctiva clear, EOMI  Neck: Supple, No JVD, No tenderness  Lungs: No wheezes, No rales, Diminished breath sounds at the bases  Cardiovascular: S1S2, Regular rhythm  Abdomen: Soft, Nontender, Nondistended, No guarding/rebound, Positive bowel sounds  Extremities: No clubbing, No cyanosis, No calf tenderness, Lower extremity edema  Neuro: Strength equal bilaterally, No tremors  Psychiatric: Appropriate mood, Normal affect    LABORATORY STUDIES:  ----------------------------------------             9.0    9.70  )-----------( 152      ( 23 Oct 2023 05:54 )             30.8     10-23    142  |  106  |  103.8<H>  ----------------------------<  125<H>  4.1   |  23.0  |  3.85<H>    Ca    7.0<L>      23 Oct 2023 05:54  Mg     1.9     10-23    TPro  4.4<L>  /  Alb  1.7<L>  /  TBili  0.4  /  DBili  x   /  AST  15  /  ALT  6   /  AlkPhos  56  10-22    LIVER FUNCTIONS - ( 22 Oct 2023 02:47 )  Alb: 1.7 g/dL / Pro: 4.4 g/dL / ALK PHOS: 56 U/L / ALT: 6 U/L / AST: 15 U/L / GGT: x           PT/INR - ( 21 Oct 2023 14:30 )   PT: 15.9 sec;   INR: 1.45 ratio    PTT - ( 21 Oct 2023 14:30 )  PTT:32.8 sec    CARDIAC MARKERS ( 23 Oct 2023 05:54 )  x     / 0.11 ng/mL / x     / x     / x      CARDIAC MARKERS ( 21 Oct 2023 18:20 )  x     / 0.11 ng/mL / x     / x     / x      CARDIAC MARKERS ( 21 Oct 2023 14:30 )  x     / 0.12 ng/mL / 152 U/L / x     / 9.3 ng/mL    10-22-23 @ 07:01  -  10-23-23 @ 07:00  --------------------------------------------------------  IN: 0 mL / OUT: 1050 mL / NET: -1050 mL    10-23-23 @ 07:01  -  10-23-23 @ 13:56  --------------------------------------------------------  IN: 236 mL / OUT: 0 mL / NET: 236 mL    Urinalysis Basic - ( 23 Oct 2023 05:54 )  Color: x / Appearance: x / SG: x / pH: x  Gluc: 125 mg/dL / Ketone: x  / Bili: x / Urobili: x   Blood: x / Protein: x / Nitrite: x   Leuk Esterase: x / RBC: x / WBC x   Sq Epi: x / Non Sq Epi: x / Bacteria: x    Culture - Blood (collected 21 Oct 2023 14:35)  Source: .Blood Blood-Peripheral  Gram Stain (22 Oct 2023 17:40):    Growth in aerobic bottle: Gram Positive Cocci in Clusters  Final Report (23 Oct 2023 10:21):    Growth in aerobic bottle: Staphylococcus epidermidis    Coagulase Negative Staphylococci isolated from a single blood culture set    may represent contamination.    Contact the Microbiology Department at [phone] if susceptibility    testing is clinically indicated.    Direct identification is available within approximately 3-5    hours either by Blood Panel Multiplexed PCR or Direct    MALDI-TOF. Details: [email or web address]  Organism: Blood Culture PCR (23 Oct 2023 10:21)  Organism: Blood Culture PCR (23 Oct 2023 10:21)    Culture - Blood (collected 21 Oct 2023 14:30)  Source: .Blood Blood-Peripheral  Preliminary Report (22 Oct 2023 22:02):    No growth at 24 hours    MEDICATIONS  (STANDING):  atorvastatin 20 milliGRAM(s) Oral at bedtime  buMETAnide Injectable 1 milliGRAM(s) IV Push two times a day  heparin   Injectable 5000 Unit(s) SubCutaneous every 8 hours  influenza  Vaccine (HIGH DOSE) 0.7 milliLiter(s) IntraMuscular once  levothyroxine 50 MICROGram(s) Oral daily  metolazone 5 milliGRAM(s) Oral daily  metoprolol succinate ER 75 milliGRAM(s) Oral daily  metroNIDAZOLE    Tablet 500 milliGRAM(s) Oral every 8 hours  pantoprazole    Tablet 40 milliGRAM(s) Oral before breakfast  sildenafil (REVATIO) 20 milliGRAM(s) Oral three times a day  Tyvaso (Treprostinil) 16 MICROGram(s) 16 MICROGram(s) Inhalation four times a day    MEDICATIONS  (PRN):  acetaminophen     Tablet .. 650 milliGRAM(s) Oral every 6 hours PRN Temp greater or equal to 38C (100.4F), Mild Pain (1 - 3)  melatonin 3 milliGRAM(s) Oral at bedtime PRN Insomnia  ondansetron Injectable 4 milliGRAM(s) IV Push every 8 hours PRN Nausea and/or Vomiting      ASSESSMENT / PLAN:  ----------------------------------------  78M with a history of severe pulmonary hypertension, atrial fibrillation, neurogenic bladder, hypothyroidism, hypertension, and gastroesophageal reflux who presented after episodes of falling at home and was found to have hypoxia requiring initiation of supplemental oxygen. Bumetanide was initiated for diuresis with improvement in the the hypoxia.    Acute hypoxic respiratory failure  - Multifactorial with pulmonary hypertension and heart failure  - Continue on supplemental oxygen as need with titration as tolerated  - Oxygen requirement decreased    Heart failure  - Elevated troponin and BNP levels  - Monitoring urine output and daily weights  - On bumetanide and metolazone  - Echocardiogram noted ejection fraction of 50 to 55%, moderately reduced right ventricular systolic function and moderate pulmonary hypertension  - For Heart Failure consultation    Pulmonary hypertension  - On sildenafil and treprostinil    Chronic kidney disease with acute kidney injury  - Monitoring renal function on diuretic therapy  - Dose of bumetanide decreased  - Nephrology consultation pending    Neurogenic bladder  - Previously self-catheterizing  - Now with Jain catheter  - For further follow up with Urology on an outpatient basis    Hypothyroidism  - On levothyroxine    Atrial fibrillation  - On metoprolol  - Not on anticoagulation at home    Fall  - Mechanical in nature  - No loss of consciousness  - Xray of the knee and hand were without fractures  - Physical Therapy evaluation noted and thought to benefit from further treatment at a rehabilitation facility    Overall prognosis guarded LETICIA MCCLURE  ----------------------------------------  The patient was seen earlier at bedside. Patient with respiratory failure. Noted some improvement in the dyspnea.    Vital Signs Last 24 Hrs  T(C): 36.6 (23 Oct 2023 13:35), Max: 37 (22 Oct 2023 18:01)  T(F): 97.9 (23 Oct 2023 13:35), Max: 98.6 (22 Oct 2023 18:01)  HR: 79 (23 Oct 2023 13:35) (67 - 98)  BP: 113/56 (23 Oct 2023 13:35) (100/52 - 139/52)  BP(mean): 71 (22 Oct 2023 15:25) (71 - 71)  RR: 18 (23 Oct 2023 13:35) (17 - 18)  SpO2: 95% (23 Oct 2023 13:35) (95% - 99%)    Parameters below as of 23 Oct 2023 13:35  Patient On (Oxygen Delivery Method): nasal cannula  O2 Flow (L/min): 1    PHYSICAL EXAMINATION:  ----------------------------------------  General appearance: No acute distress, Awake, Alert  HEENT: Normocephalic, Atraumatic, Conjunctiva clear, EOMI  Neck: Supple, No JVD, No tenderness  Lungs: No wheezes, No rales, Diminished breath sounds at the bases  Cardiovascular: S1S2, Regular rhythm  Abdomen: Soft, Nontender, Nondistended, No guarding/rebound, Positive bowel sounds  Extremities: No clubbing, No cyanosis, No calf tenderness, Lower extremity edema  Neuro: Strength equal bilaterally, No tremors  Psychiatric: Appropriate mood, Normal affect    LABORATORY STUDIES:  ----------------------------------------             9.0    9.70  )-----------( 152      ( 23 Oct 2023 05:54 )             30.8     10-23    142  |  106  |  103.8<H>  ----------------------------<  125<H>  4.1   |  23.0  |  3.85<H>    Ca    7.0<L>      23 Oct 2023 05:54  Mg     1.9     10-23    TPro  4.4<L>  /  Alb  1.7<L>  /  TBili  0.4  /  DBili  x   /  AST  15  /  ALT  6   /  AlkPhos  56  10-22    LIVER FUNCTIONS - ( 22 Oct 2023 02:47 )  Alb: 1.7 g/dL / Pro: 4.4 g/dL / ALK PHOS: 56 U/L / ALT: 6 U/L / AST: 15 U/L / GGT: x           PT/INR - ( 21 Oct 2023 14:30 )   PT: 15.9 sec;   INR: 1.45 ratio    PTT - ( 21 Oct 2023 14:30 )  PTT:32.8 sec    CARDIAC MARKERS ( 23 Oct 2023 05:54 )  x     / 0.11 ng/mL / x     / x     / x      CARDIAC MARKERS ( 21 Oct 2023 18:20 )  x     / 0.11 ng/mL / x     / x     / x      CARDIAC MARKERS ( 21 Oct 2023 14:30 )  x     / 0.12 ng/mL / 152 U/L / x     / 9.3 ng/mL    10-22-23 @ 07:01  -  10-23-23 @ 07:00  --------------------------------------------------------  IN: 0 mL / OUT: 1050 mL / NET: -1050 mL    10-23-23 @ 07:01  -  10-23-23 @ 13:56  --------------------------------------------------------  IN: 236 mL / OUT: 0 mL / NET: 236 mL    Urinalysis Basic - ( 23 Oct 2023 05:54 )  Color: x / Appearance: x / SG: x / pH: x  Gluc: 125 mg/dL / Ketone: x  / Bili: x / Urobili: x   Blood: x / Protein: x / Nitrite: x   Leuk Esterase: x / RBC: x / WBC x   Sq Epi: x / Non Sq Epi: x / Bacteria: x    Culture - Blood (collected 21 Oct 2023 14:35)  Source: .Blood Blood-Peripheral  Gram Stain (22 Oct 2023 17:40):    Growth in aerobic bottle: Gram Positive Cocci in Clusters  Final Report (23 Oct 2023 10:21):    Growth in aerobic bottle: Staphylococcus epidermidis    Coagulase Negative Staphylococci isolated from a single blood culture set    may represent contamination.    Contact the Microbiology Department at [phone] if susceptibility    testing is clinically indicated.    Direct identification is available within approximately 3-5    hours either by Blood Panel Multiplexed PCR or Direct    MALDI-TOF. Details: [email or web address]  Organism: Blood Culture PCR (23 Oct 2023 10:21)  Organism: Blood Culture PCR (23 Oct 2023 10:21)    Culture - Blood (collected 21 Oct 2023 14:30)  Source: .Blood Blood-Peripheral  Preliminary Report (22 Oct 2023 22:02):    No growth at 24 hours    MEDICATIONS  (STANDING):  atorvastatin 20 milliGRAM(s) Oral at bedtime  buMETAnide Injectable 1 milliGRAM(s) IV Push two times a day  heparin   Injectable 5000 Unit(s) SubCutaneous every 8 hours  influenza  Vaccine (HIGH DOSE) 0.7 milliLiter(s) IntraMuscular once  levothyroxine 50 MICROGram(s) Oral daily  metolazone 5 milliGRAM(s) Oral daily  metoprolol succinate ER 75 milliGRAM(s) Oral daily  metroNIDAZOLE    Tablet 500 milliGRAM(s) Oral every 8 hours  pantoprazole    Tablet 40 milliGRAM(s) Oral before breakfast  sildenafil (REVATIO) 20 milliGRAM(s) Oral three times a day  Tyvaso (Treprostinil) 16 MICROGram(s) 16 MICROGram(s) Inhalation four times a day    MEDICATIONS  (PRN):  acetaminophen     Tablet .. 650 milliGRAM(s) Oral every 6 hours PRN Temp greater or equal to 38C (100.4F), Mild Pain (1 - 3)  melatonin 3 milliGRAM(s) Oral at bedtime PRN Insomnia  ondansetron Injectable 4 milliGRAM(s) IV Push every 8 hours PRN Nausea and/or Vomiting      ASSESSMENT / PLAN:  ----------------------------------------  78M with a history of severe pulmonary hypertension, atrial fibrillation, neurogenic bladder, hypothyroidism, hypertension, and gastroesophageal reflux who presented after episodes of falling at home and was found to have hypoxia requiring initiation of supplemental oxygen. Bumetanide was initiated for diuresis with improvement in the the hypoxia.    Acute hypoxic respiratory failure  - Multifactorial with pulmonary hypertension and heart failure  - Continue on supplemental oxygen as need with titration as tolerated  - Oxygen requirement decreased    Heart failure  - Elevated troponin and BNP levels  - Monitoring urine output and daily weights  - On bumetanide and metolazone  - Echocardiogram noted ejection fraction of 50 to 55%, moderately reduced right ventricular systolic function and moderate pulmonary hypertension  - For Heart Failure consultation    Pulmonary hypertension  - On sildenafil and treprostinil    Chronic kidney disease with acute kidney injury  - Monitoring renal function on diuretic therapy  - Dose of bumetanide decreased  - Nephrology consultation pending    Neurogenic bladder  - Previously self-catheterizing  - Now with Jain catheter  - For further follow up with Urology on an outpatient basis    Hypothyroidism  - On levothyroxine    Atrial fibrillation  - On metoprolol  - Not on anticoagulation at home    Fall  - Mechanical in nature  - No loss of consciousness  - Xray of the knee and hand were without fractures  - Physical Therapy evaluation noted and thought to benefit from further treatment at a rehabilitation facility    Bacteremia  - Blood culture (10/21) grew Staph epidermidis in one bottle  - Suspect contaminant  - Afebrile and without leukocytosis    Overall prognosis guarded

## 2023-10-23 NOTE — PHYSICAL THERAPY INITIAL EVALUATION ADULT - ACTIVE RANGE OF MOTION EXAMINATION, REHAB EVAL
left knee flexion limited secondary to abrasion (sustained with fall at home)/bilateral  lower extremity Active ROM was WFL (within functional limits)/deficits as listed below

## 2023-10-23 NOTE — DIETITIAN INITIAL EVALUATION ADULT - OTHER INFO
78M with a history of severe pulmonary hypertension, atrial fibrillation, neurogenic bladder, hypothyroidism, hypertension, and gastroesophageal reflux who presented after episodes of falling at home and was found to have hypoxia.    Acute hypoxic respiratory failure

## 2023-10-23 NOTE — DIETITIAN INITIAL EVALUATION ADULT - NS FNS DIET ORDER
Diet, DASH/TLC:   Sodium & Cholesterol Restricted  1500mL Fluid Restriction ([id number])     Special Instructions for Nursin milliLiter(s) to 2000 milliLiter(s) fluid restriction (10-21-23 @ 16:59)

## 2023-10-23 NOTE — CONSULT NOTE ADULT - SUBJECTIVE AND OBJECTIVE BOX
Patient is a 78y old  Male who presents with a chief complaint of Hypoxemia     (23 Oct 2023 12:04)      HPI:  77 yo male with hx of A. fib not on AC, Pulmonary HTN, Neurogenic bladder with self cath, hypothyroid, HTN, BPH and GERD presented after a fall. Patient reports that he lives alone and was walking from one room to another and then tripped and fell. Reports that he laid on the floor for 3 hour and then his son and daughter in law presented to pick him up. Then patient was ambulating and again his legs gave out and he fell. This time he asked his son to take him to the hospital for an evaluation. While in the ED patient was noted to be hypoxic into the Low 80s. Patient was placed on oxygen with noted improvement. BNP was noted to be over doubled with an increase in troponin. Admission to medicine was requested for acute hypoxic respiratory failure.   (21 Oct 2023 16:59)    Admitted w/ symptomatic fluid overload   Feeling better w/ IV diuresis   + Jain   Follows w/ [facility]al at [facility]   cardio follow up noted   Baseline creat 2.5 - 3       PAST MEDICAL & SURGICAL HISTORY:  HTN (hypertension)      HLD (hyperlipidemia)      Bladder-neck obstruction      Pulmonary hypertension      Chronic renal insufficiency      History of cirrhosis of liver      Atrial fibrillation      Hypothyroidism      MARIMAR (obstructive sleep apnea)      S/P TAVR (transcatheter aortic valve replacement)      History of cholecystectomy      H/O hernia repair      History of mitral valve repair      Pacemaker          FAMILY HISTORY:  FH: heart disease (Father)        Social History:    MEDICATIONS  (STANDING):  atorvastatin 20 milliGRAM(s) Oral at bedtime  buMETAnide Injectable 1 milliGRAM(s) IV Push two times a day  heparin   Injectable 5000 Unit(s) SubCutaneous every 8 hours  influenza  Vaccine (HIGH DOSE) 0.7 milliLiter(s) IntraMuscular once  levothyroxine 50 MICROGram(s) Oral daily  metolazone 5 milliGRAM(s) Oral daily  metoprolol succinate ER 75 milliGRAM(s) Oral daily  metroNIDAZOLE    Tablet 500 milliGRAM(s) Oral every 8 hours  pantoprazole    Tablet 40 milliGRAM(s) Oral before breakfast  sildenafil (REVATIO) 20 milliGRAM(s) Oral three times a day  Tyvaso (Treprostinil) 16 MICROGram(s) 16 MICROGram(s) Inhalation four times a day    MEDICATIONS  (PRN):  acetaminophen     Tablet .. 650 milliGRAM(s) Oral every 6 hours PRN Temp greater or equal to 38C (100.4F), Mild Pain (1 - 3)  melatonin 3 milliGRAM(s) Oral at bedtime PRN Insomnia  ondansetron Injectable 4 milliGRAM(s) IV Push every 8 hours PRN Nausea and/or Vomiting      Allergies    No Known Drug Allergies  adhesives (Other)    Intolerances          Vital Signs Last 24 Hrs  T(C): 36.6 (23 Oct 2023 13:35), Max: 37 (22 Oct 2023 18:01)  T(F): 97.9 (23 Oct 2023 13:35), Max: 98.6 (22 Oct 2023 18:01)  HR: 79 (23 Oct 2023 13:35) (67 - 98)  BP: 113/56 (23 Oct 2023 13:35) (100/52 - 139/52)  BP(mean): 71 (22 Oct 2023 15:25) (71 - 71)  RR: 18 (23 Oct 2023 13:35) (17 - 18)  SpO2: 95% (23 Oct 2023 13:35) (95% - 99%)    Parameters below as of 23 Oct 2023 13:35  Patient On (Oxygen Delivery Method): nasal cannula  O2 Flow (L/min): 1    Daily     Daily Weight in k.1 (23 Oct 2023 09:31)  I&O's Detail    22 Oct 2023 07:  -  23 Oct 2023 07:00  --------------------------------------------------------  IN:  Total IN: 0 mL    OUT:    Voided (mL): 1050 mL  Total OUT: 1050 mL    Total NET: -1050 mL      23 Oct 2023 07:01  -  23 Oct 2023 13:59  --------------------------------------------------------  IN:    Oral Fluid: 236 mL  Total IN: 236 mL    OUT:    Indwelling Catheter - Urethral (mL): 650 mL  Total OUT: 650 mL    Total NET: -414 mL        I&O's Summary    22 Oct 2023 07:01  -  23 Oct 2023 07:00  --------------------------------------------------------  IN: 0 mL / OUT: 1050 mL / NET: -1050 mL    23 Oct 2023 07:01  -  23 Oct 2023 13:59  --------------------------------------------------------  IN: 236 mL / OUT: 650 mL / NET: -414 mL        PHYSICAL EXAM:    GENERAL: NAD, Feels better   HEAD:  Atraumatic, No edema   NECK: Supple, No JVD,   NERVOUS SYSTEM:  Alert & Oriented X3,  CHEST/LUNG: EAE , Improved aeration , no wheeze   HEART: Regular rate and rhythm; No rub   ABDOMEN: Soft, Nontender, Nondistended; Bowel sounds present  EXTREMITIES:  Pitting edema ---> better   + Jain ---> Clear     LABS:                        9.0    9.70  )-----------( 152      ( 23 Oct 2023 05:54 )             30.8     10    142  |  106  |  103.8<H>  ----------------------------<  125<H>  4.1   |  23.0  |  3.85<H>    Ca    7.0<L>      23 Oct 2023 05:54  Mg     1.9     10    TPro  4.4<L>  /  Alb  1.7<L>  /  TBili  0.4  /  DBili  x   /  AST  15  /  ALT  6   /  AlkPhos  56  1022    PT/INR - ( 21 Oct 2023 14:30 )   PT: 15.9 sec;   INR: 1.45 ratio         PTT - ( 21 Oct 2023 14:30 )  PTT:32.8 sec  Urinalysis Basic - ( 23 Oct 2023 05:54 )    Color: x / Appearance: x / SG: x / pH: x  Gluc: 125 mg/dL / Ketone: x  / Bili: x / Urobili: x   Blood: x / Protein: x / Nitrite: x   Leuk Esterase: x / RBC: x / WBC x   Sq Epi: x / Non Sq Epi: x / Bacteria: x      Magnesium: 1.9 mg/dL (10-23 @ 05:54)        < from: Xray Chest 1 View- PORTABLE-Urgent (10.21.23 @ 16:46) >    ACC: [id number] EXAM:  XR CHEST PORTABLE URGENT 1V   ORDERED BY: BERNIE CHOPRASOV     ACC: [id number] EXAM:  XR KNEE 3 VIEWS LT   ORDERED BY: BERNIE CHOPRASOV     ACC: [id number] EXAM:  XR HAND MIN 3 VIEWS RT   ORDERED BY: BERNIE GILMAN     PROCEDURE DATE:  10/21/2023          INTERPRETATION:  Right hand, left knee, and chest. Patient had a fall and   is hypoxic.    Right hand. 3 views.    There is scattered mild IP degeneration. There is mild degeneration   around the trapezium. No fracture.        Left knee. 3 views. Arterial calcifications noted. No effusion. Mild   central degeneration. No fracture.    AP chest on 2023 at 3:29 PM.    Heart magnified by technique.    Sternotomy, left loop recorder, left-sided pacemaker, and TAVR aortic   valve again noted.    On October 3 there was a right perihilar infiltrate and slight left   perihilar infiltrate of uncertain etiology.    On present examination the right perihilar infiltrate is improved. There   is mild residual bilateral perihilar infiltration. Left upper rib   deformity is unchanged.    IMPRESSION: No fractures.    Prior infiltrates have improved. Mild central residual may represent   slight central CHF.    --- End of Report ---            LISANDRO BACH MD; Attending Radiologist  This docume    < end of copied text >    < from: TTE Echo Complete w/ Contrast w/ Doppler (10.22.23 @ 13:18) >  Summary:   1. Endocardial visualization was enhanced with intravenous echo contrast.   2. Left ventricular ejection fraction, by visual estimation, is 50 to   55%.   3. Normal global left ventricular systolic function.   4. Severely enlarged right atrium.   5. The mitral in-flow pattern reveals no discernable A-wave, therefore   no comment on diastolic function canbe made.   6. There is mild concentric left ventricular hypertrophy.   7. Severely enlarged right ventricle.   8. Moderately reduced RV systolic function.   9. Trace mitral valve regurgitation.  10. Moderate-severe tricuspid regurgitation.  11. TAVR in the aortic position.  12. Trivial paravalvular leak.  13. Peak aortic valve gradient is 18.5 mmHg and the mean gradient is 7.1   mmHg, which is probably normal in the setting of a prosthetic aortic   valve.  14. Moderately dilated pulmonary artery.  15. Estimated pulmonary artery systolic pressure is 53.7 mmHg assuming a   right atrial pressure of 15 mmHg, which is consistent with moderate   pulmonary hypertension.    MD Hermes Electronically signed on 10/22/2023 at 2:09:08 PM    < end of copied text >  RADIOLOGY & ADDITIONAL TESTS:

## 2023-10-23 NOTE — CONSULT NOTE ADULT - SUBJECTIVE AND OBJECTIVE BOX
ADVANCED HEART FAILURE - CONSULT NOTE  [address] [place] [address]  Office Phone: [phone]/Fax: [phone]  Service/On Call Phone [phone]  _______________________________________________________________________________________________________    HPI:  79 y/o M with a history of RV dysfunction in the setting of severe PH (followed at [facility] on sildenafil and Tyvaso), MV repair and JEANMARIE ligation in  at [facility], TAVR, PPM, AFib not on AC due to GI bleed, HTN, HLD, CKD (b/l Cr 2.5-3), and neurogenic bladder, who presented after a fall at home.     Patient reports that he lives alone and was walking from one room to another and then tripped and fell. Reports that he laid on the floor for 3 hours and then his son and daughter in law presented to pick him up. Then patient was ambulating and again his legs gave out and he fell. This time he asked his son to take him to the hospital for an evaluation. While in the ED patient was noted to be hypoxic into the Low 80s. Patient was placed on oxygen with noted improvement. Pro-BNP was 15,886 on admission. He was placed on intermittent IV Bumex and was noted to have worsening renal function. HF consulted for further management.       PAST MEDICAL & SURGICAL HISTORY:  HTN (hypertension)      HLD (hyperlipidemia)      Bladder-neck obstruction      Pulmonary hypertension      Chronic renal insufficiency      History of cirrhosis of liver      Atrial fibrillation      Hypothyroidism      MARIMAR (obstructive sleep apnea)      S/P TAVR (transcatheter aortic valve replacement)      History of cholecystectomy      H/O hernia repair      History of mitral valve repair      Pacemaker        REVIEW OF SYSTEMS:  14 point ROS negative in detail apart from as documented in HPI.    MEDICATIONS  (STANDING):  atorvastatin 20 milliGRAM(s) Oral at bedtime  buMETAnide 1 milliGRAM(s) Oral two times a day  clotrimazole 1% Cream 1 Application(s) Topical two times a day  epoetin yuliana-epbx (RETACRIT) Injectable 61697 Unit(s) SubCutaneous every 7 days  folic acid 1 milliGRAM(s) Oral daily  heparin   Injectable 5000 Unit(s) SubCutaneous every 8 hours  influenza  Vaccine (HIGH DOSE) 0.7 milliLiter(s) IntraMuscular once  levothyroxine 50 MICROGram(s) Oral daily  metolazone 5 milliGRAM(s) Oral daily  metoprolol succinate ER 75 milliGRAM(s) Oral daily  metroNIDAZOLE    Tablet 500 milliGRAM(s) Oral every 8 hours  pantoprazole    Tablet 40 milliGRAM(s) Oral before breakfast  sildenafil (REVATIO) 20 milliGRAM(s) Oral three times a day  Tyvaso (Treprostinil) 16 MICROGram(s) 16 MICROGram(s) Inhalation four times a day    MEDICATIONS  (PRN):  acetaminophen     Tablet .. 650 milliGRAM(s) Oral every 6 hours PRN Temp greater or equal to 38C (100.4F), Mild Pain (1 - 3)  melatonin 3 milliGRAM(s) Oral at bedtime PRN Insomnia  ondansetron Injectable 4 milliGRAM(s) IV Push every 8 hours PRN Nausea and/or Vomiting    Home Medications:  allopurinol 100 mg oral tablet: 1 tab(s) orally once a day (21 Oct 2023 16:50)  bumetanide 1 mg oral tablet: 1 tab(s) orally 2 times a day (21 Oct 2023 16:44)  Kerendia 10 mg oral tablet: 1 tab(s) orally once a day (21 Oct 2023 16:50)  levothyroxine 50 mcg (0.05 mg) oral tablet: 1 tab(s) orally once a day (21 Oct 2023 16:50)  Lipitor 20 mg oral tablet: 1 tab(s) orally once a day (21 Oct 2023 16:50)  metOLazone 5 mg oral tablet: 1 tab(s) orally once a day (21 Oct 2023 16:47)  Metoprolol Succinate ER 25 mg oral tablet, extended release: 3 tab(s) orally once a day (21 Oct 2023 16:49)  potassium chloride 10 mEq oral capsule, extended release: 1 cap(s) orally 2 times a day (21 Oct 2023 16:50)  Protonix 40 mg oral delayed release tablet: 1 tab(s) orally once a day (21 Oct 2023 16:50)  sildenafil 20 mg oral tablet: 1 tab(s) orally 3 times a day (21 Oct 2023 16:50)  Tyvaso DPI Maintenance Kit 16 mcg inhalation powder: 1 each inhaled every 4 hours (21 Oct 2023 16:57)    Allergies    No Known Drug Allergies  adhesives (Other)    Social History:  Does not smoke, drink or use illicit substances (21 Oct 2023 16:59)    FAMILY HISTORY:  FH: heart disease (Father)      ICU Vital Signs Last 24 Hrs  T(C): 36.6, Max: 37 (10-22 @ 18:01)  HR: 79 (67 - 98)  BP: 113/56 (100/52 - 139/52)  BP(mean): 71 (71 - 71)  ABP: --  ABP(mean): --  RR: 18 (17 - 18)  SpO2: 95% (95% - 99%)    Weight in k.1 (10-23-23)      I&O's Summary Last 24 Hrs    IN: 0 mL / OUT: 1050 mL / NET: -1050 mL      Tele:    Physical Exam:    General: No distress. Comfortable.  Neck: JVP not elevated.  Respiratory: Clear to auscultation bilaterally  CV: RRR. Normal S1 and S2. No murmurs, rub, or gallops. Radial pulses normal.  Abdomen: Soft, non-distended, non-tender  Extremities: Warm, no edema  Neurology: Non-focal, alert and oriented times three.   Psych: Affect normal    Labs:    ( 10-23-23 @ 05:54 )               9.0    9.70  )--------( 152                  30.8     ( 10-23-23 @ 05:54 )     142  |  106  |  103.8  ---------------------<  125  4.1  |  23.0  |  3.85    Ca 7.0  Phos x   Mg 1.9    ( 10-22-23 @ 02:47 )  TPro  4.4  /  Alb  1.7  /  TBili  0.4  /  DBili  x   /  AST  15  /  ALT  6   /  AlkPhos  56  ( 10-21-23 @ 14:30 )  TPro  5.1  /  Alb  2.1  /  TBili  0.6  /  DBili  x   /  AST  20  /  ALT  8   /  AlkPhos  71      ( 10-21-23 @ 14:30 )  TropHS x     /    / CKMB x       ADVANCED HEART FAILURE - CONSULT NOTE  [address] [place] [address]  Office Phone: [phone]/Fax: [phone]  Service/On Call Phone [phone]  _______________________________________________________________________________________________________    HPI:  79 y/o M with a history of RV dysfunction in the setting of severe PH (followed at [facility] on sildenafil and Tyvaso), MV repair and JEANMARIE ligation in  at [facility], TAVR, PPM, AFib not on AC due to GI bleed, HTN, HLD, CKD (b/l Cr 2.5-3), and neurogenic bladder, who presented after a fall at home.     Patient reports that he lives alone and was walking from one room to another and then tripped and fell. Reports that he laid on the floor for 3 hours and then his son and daughter in law presented to pick him up. Then patient was ambulating and again his legs gave out and he fell. This time he asked his son to take him to the hospital for an evaluation. While in the ED patient was noted to be hypoxic into the Low 80s. Patient was placed on oxygen with noted improvement. Pro-BNP was 15,886 on admission. He was placed on intermittent IV Bumex and was noted to have worsening renal function. HF consulted for further management.       PAST MEDICAL & SURGICAL HISTORY:  HTN (hypertension)      HLD (hyperlipidemia)      Bladder-neck obstruction      Pulmonary hypertension      Chronic renal insufficiency      History of cirrhosis of liver      Atrial fibrillation      Hypothyroidism      MARIMAR (obstructive sleep apnea)      S/P TAVR (transcatheter aortic valve replacement)      History of cholecystectomy      H/O hernia repair      History of mitral valve repair      Pacemaker        REVIEW OF SYSTEMS:  14 point ROS negative in detail apart from as documented in HPI.    MEDICATIONS  (STANDING):  atorvastatin 20 milliGRAM(s) Oral at bedtime  buMETAnide 1 milliGRAM(s) Oral two times a day  clotrimazole 1% Cream 1 Application(s) Topical two times a day  epoetin yuliana-epbx (RETACRIT) Injectable 08964 Unit(s) SubCutaneous every 7 days  folic acid 1 milliGRAM(s) Oral daily  heparin   Injectable 5000 Unit(s) SubCutaneous every 8 hours  influenza  Vaccine (HIGH DOSE) 0.7 milliLiter(s) IntraMuscular once  levothyroxine 50 MICROGram(s) Oral daily  metolazone 5 milliGRAM(s) Oral daily  metoprolol succinate ER 75 milliGRAM(s) Oral daily  metroNIDAZOLE    Tablet 500 milliGRAM(s) Oral every 8 hours  pantoprazole    Tablet 40 milliGRAM(s) Oral before breakfast  sildenafil (REVATIO) 20 milliGRAM(s) Oral three times a day  Tyvaso (Treprostinil) 16 MICROGram(s) 16 MICROGram(s) Inhalation four times a day    MEDICATIONS  (PRN):  acetaminophen     Tablet .. 650 milliGRAM(s) Oral every 6 hours PRN Temp greater or equal to 38C (100.4F), Mild Pain (1 - 3)  melatonin 3 milliGRAM(s) Oral at bedtime PRN Insomnia  ondansetron Injectable 4 milliGRAM(s) IV Push every 8 hours PRN Nausea and/or Vomiting    Home Medications:  allopurinol 100 mg oral tablet: 1 tab(s) orally once a day (21 Oct 2023 16:50)  bumetanide 1 mg oral tablet: 1 tab(s) orally 2 times a day (21 Oct 2023 16:44)  Kerendia 10 mg oral tablet: 1 tab(s) orally once a day (21 Oct 2023 16:50)  levothyroxine 50 mcg (0.05 mg) oral tablet: 1 tab(s) orally once a day (21 Oct 2023 16:50)  Lipitor 20 mg oral tablet: 1 tab(s) orally once a day (21 Oct 2023 16:50)  metOLazone 5 mg oral tablet: 1 tab(s) orally once a day (21 Oct 2023 16:47)  Metoprolol Succinate ER 25 mg oral tablet, extended release: 3 tab(s) orally once a day (21 Oct 2023 16:49)  potassium chloride 10 mEq oral capsule, extended release: 1 cap(s) orally 2 times a day (21 Oct 2023 16:50)  Protonix 40 mg oral delayed release tablet: 1 tab(s) orally once a day (21 Oct 2023 16:50)  sildenafil 20 mg oral tablet: 1 tab(s) orally 3 times a day (21 Oct 2023 16:50)  Tyvaso DPI Maintenance Kit 16 mcg inhalation powder: 1 each inhaled every 4 hours (21 Oct 2023 16:57)    Allergies    No Known Drug Allergies  adhesives (Other)    Social History:  Does not smoke, drink or use illicit substances (21 Oct 2023 16:59)    FAMILY HISTORY:  FH: heart disease (Father)      ICU Vital Signs Last 24 Hrs  T(C): 36.6, Max: 37 (10-22 @ 18:01)  HR: 79 (67 - 98)  BP: 113/56 (100/52 - 139/52)  BP(mean): 71 (71 - 71)  ABP: --  ABP(mean): --  RR: 18 (17 - 18)  SpO2: 95% (95% - 99%)    Weight in k.1 (10-23-23)      I&O's Summary Last 24 Hrs    IN: 0 mL / OUT: 1050 mL / NET: -1050 mL      Tele: AFib, frequent PVCs with 9 bts NSVT     Physical Exam:    General: No distress. Comfortable.  Neck: JVP severely elevated with large v waves  Respiratory: Clear to auscultation bilaterally  CV: Normal S1 and S2. II/VI SM. Radial pulses normal.  Abdomen: Soft, non-distended, non-tender  Extremities: Warm, 1+ BLE edema and 2+ LUE edema  Neurology: Non-focal, alert and oriented times three.   Psych: Affect normal    Labs:    ( 10-23-23 @ 05:54 )               9.0    9.70  )--------( 152                  30.8     ( 10-23-23 @ 05:54 )     142  |  106  |  103.8  ---------------------<  125  4.1  |  23.0  |  3.85    Ca 7.0  Phos x   Mg 1.9    ( 10-22-23 @ 02:47 )  TPro  4.4  /  Alb  1.7  /  TBili  0.4  /  DBili  x   /  AST  15  /  ALT  6   /  AlkPhos  56  ( 10-21-23 @ 14:30 )  TPro  5.1  /  Alb  2.1  /  TBili  0.6  /  DBili  x   /  AST  20  /  ALT  8   /  AlkPhos  71      ( 10-21-23 @ 14:30 )  TropHS x     /    / CKMB x

## 2023-10-23 NOTE — DIETITIAN INITIAL EVALUATION ADULT - PERTINENT MEDS FT
MEDICATIONS  (STANDING):  atorvastatin 20 milliGRAM(s) Oral at bedtime  buMETAnide Injectable 1 milliGRAM(s) IV Push two times a day  heparin   Injectable 5000 Unit(s) SubCutaneous every 8 hours  influenza  Vaccine (HIGH DOSE) 0.7 milliLiter(s) IntraMuscular once  levothyroxine 50 MICROGram(s) Oral daily  metolazone 5 milliGRAM(s) Oral daily  metoprolol succinate ER 75 milliGRAM(s) Oral daily  metroNIDAZOLE    Tablet 500 milliGRAM(s) Oral every 8 hours  pantoprazole    Tablet 40 milliGRAM(s) Oral before breakfast  sildenafil (REVATIO) 20 milliGRAM(s) Oral three times a day  Tyvaso (Treprostinil) 16 MICROGram(s) 16 MICROGram(s) Inhalation four times a day    MEDICATIONS  (PRN):  acetaminophen     Tablet .. 650 milliGRAM(s) Oral every 6 hours PRN Temp greater or equal to 38C (100.4F), Mild Pain (1 - 3)  melatonin 3 milliGRAM(s) Oral at bedtime PRN Insomnia  ondansetron Injectable 4 milliGRAM(s) IV Push every 8 hours PRN Nausea and/or Vomiting

## 2023-10-23 NOTE — CONSULT NOTE ADULT - PROBLEM SELECTOR RECOMMENDATION 4
- nephrology consulted today  - c/w diuretics as above - appreciate nephrology recs  - c/w diuresis as above

## 2023-10-23 NOTE — DIETITIAN INITIAL EVALUATION ADULT - NSICDXPASTMEDICALHX_GEN_ALL_CORE_FT
PAST MEDICAL HISTORY:  Atrial fibrillation     Bladder-neck obstruction     Chronic renal insufficiency     History of cirrhosis of liver     HLD (hyperlipidemia)     HTN (hypertension)     Hypothyroidism     MARMIAR (obstructive sleep apnea)     Pulmonary hypertension

## 2023-10-23 NOTE — PHYSICAL THERAPY INITIAL EVALUATION ADULT - PASSIVE RANGE OF MOTION EXAMINATION, REHAB EVAL
----- Message from Aayush Fair MD sent at 7/22/2021  1:21 AM CDT -----  Uric acid is high, not at goal.  Recommend to increase allopurinol to 100 twice a day. Repeat uric acid levels in 2 months.   Rest of blood work looks good and can be repeated in 1
LMTCB
Patient advised. Verbalized understanding.    Recall placed
bilateral lower extremity Passive ROM was WFL (within functional limits)

## 2023-10-23 NOTE — CONSULT NOTE ADULT - ASSESSMENT
78y Male PMHx HTN, HLD, neurogenic bladder, AFib not on AC due to GI bleed, MV repair 2004 at [facility] and JEANMARIE ligation, severe pulm HTN follwed at [facility] on sildenafil and Tyvaso, ILR, TAVR, [facility] PPM,   Chronic systolic/diastolic HF LVEF 45%, CKD pw mechanical fall without syncope. Pt also notes worsening LE edema and increased weight gain.   Right and Left Cath Dr Fernandez [facility] 9/18/23 Scanned into [facility] EMR: normal cors except for mRCA 30%, no hemodynamic significant response to nitric oxide    CKD IV - BIV CM   Neurogenic bladder with chronic eubanks  Baseline creat 2.5 - 3   ECHO 10/22 noted   Improved symptomatic fluid overload   Responding to current diuretic regimen   Need to accept some degree of enhanced azotemia   Renal sono   Strict I/O   follow labs   Cardio follow up noted     Anemia - Add Epogen and folate , check iron stores and TFT's

## 2023-10-23 NOTE — PHYSICAL THERAPY INITIAL EVALUATION ADULT - PERTINENT HX OF CURRENT PROBLEM, REHAB EVAL
Pt is 77 yo male with hx of A. fib not on AC, Pulmonary HTN, Neurogenic bladder with self cath, hypothyroid, HTN, BPH and GERD presented after a fall. Reports that he laid on the floor for 3 hour and then his son and daughter in law presented to pick him up. Then patient was ambulating and again his legs gave out and he fell. Admitted with acute hypoxic resp failure

## 2023-10-23 NOTE — CONSULT NOTE ADULT - PROBLEM SELECTOR RECOMMENDATION 3
- not on AC due to h/i GI bleed  - rate controlled on Toprol XL 75 mg daily - not on AC due to h/i GI bleed  - c/w Toprol XL 75 mg daily

## 2023-10-23 NOTE — PHYSICAL THERAPY INITIAL EVALUATION ADULT - ADDITIONAL COMMENTS
Pt reports independent until 3 weeks ago when he starting feeling weak and required rollator for ambulation. Pt owns shower chair as well, does not normally use. Pt has stairlift to second floor condo. Family is supportive

## 2023-10-23 NOTE — CONSULT NOTE ADULT - PROBLEM SELECTOR RECOMMENDATION 5
- frequent PVCs and short runs of NSVT on tele  - would ask EP to interrogate PPM  - replete electrolytes PRN to keep K >4 and Mag >2 - frequent PVCs and short runs of NSVT on tele  - recommend EP/[facility] c/s Re: frequent PVCs, interrogate PPM  - replete electrolytes PRN to keep K >4 and Mag >2

## 2023-10-23 NOTE — DIETITIAN INITIAL EVALUATION ADULT - ORAL INTAKE PTA/DIET HISTORY
Pt reports that he is tolerating his meals with good PO intake. Reports that weight fluctuates 200-220 lbs. current weight 220. Pt states he understands the low sodium diet restriction, but finds it difficult to follow at home. Some stratagems discussed. Pt receptive, all questions answered.

## 2023-10-24 PROBLEM — R33.9 INCOMPLETE EMPTYING OF BLADDER: Status: ACTIVE | Noted: 2023-10-06

## 2023-10-24 LAB
ANION GAP SERPL CALC-SCNC: 14 MMOL/L — SIGNIFICANT CHANGE UP (ref 5–17)
ANION GAP SERPL CALC-SCNC: 14 MMOL/L — SIGNIFICANT CHANGE UP (ref 5–17)
BUN SERPL-MCNC: 102.5 MG/DL — HIGH (ref 8–20)
BUN SERPL-MCNC: 102.5 MG/DL — HIGH (ref 8–20)
CALCIUM SERPL-MCNC: 7.2 MG/DL — LOW (ref 8.4–10.5)
CALCIUM SERPL-MCNC: 7.2 MG/DL — LOW (ref 8.4–10.5)
CHLORIDE SERPL-SCNC: 106 MMOL/L — SIGNIFICANT CHANGE UP (ref 96–108)
CHLORIDE SERPL-SCNC: 106 MMOL/L — SIGNIFICANT CHANGE UP (ref 96–108)
CO2 SERPL-SCNC: 26 MMOL/L — SIGNIFICANT CHANGE UP (ref 22–29)
CO2 SERPL-SCNC: 26 MMOL/L — SIGNIFICANT CHANGE UP (ref 22–29)
CREAT SERPL-MCNC: 3.57 MG/DL — HIGH (ref 0.5–1.3)
CREAT SERPL-MCNC: 3.57 MG/DL — HIGH (ref 0.5–1.3)
EGFR: 17 ML/MIN/1.73M2 — LOW
EGFR: 17 ML/MIN/1.73M2 — LOW
FERRITIN SERPL-MCNC: 24 NG/ML — LOW (ref 30–400)
FERRITIN SERPL-MCNC: 24 NG/ML — LOW (ref 30–400)
GLUCOSE SERPL-MCNC: 123 MG/DL — HIGH (ref 70–99)
GLUCOSE SERPL-MCNC: 123 MG/DL — HIGH (ref 70–99)
IRON SATN MFR SERPL: 13 UG/DL — LOW (ref 59–158)
IRON SATN MFR SERPL: 13 UG/DL — LOW (ref 59–158)
IRON SATN MFR SERPL: 5 % — LOW (ref 16–55)
IRON SATN MFR SERPL: 5 % — LOW (ref 16–55)
MAGNESIUM SERPL-MCNC: 1.8 MG/DL — SIGNIFICANT CHANGE UP (ref 1.6–2.6)
MAGNESIUM SERPL-MCNC: 1.8 MG/DL — SIGNIFICANT CHANGE UP (ref 1.6–2.6)
POTASSIUM SERPL-MCNC: 3.1 MMOL/L — LOW (ref 3.5–5.3)
POTASSIUM SERPL-MCNC: 3.1 MMOL/L — LOW (ref 3.5–5.3)
POTASSIUM SERPL-SCNC: 3.1 MMOL/L — LOW (ref 3.5–5.3)
POTASSIUM SERPL-SCNC: 3.1 MMOL/L — LOW (ref 3.5–5.3)
SARS-COV-2 RNA SPEC QL NAA+PROBE: SIGNIFICANT CHANGE UP
SARS-COV-2 RNA SPEC QL NAA+PROBE: SIGNIFICANT CHANGE UP
SODIUM SERPL-SCNC: 146 MMOL/L — HIGH (ref 135–145)
SODIUM SERPL-SCNC: 146 MMOL/L — HIGH (ref 135–145)
T4 FREE SERPL-MCNC: 1 NG/DL — SIGNIFICANT CHANGE UP (ref 0.9–1.8)
T4 FREE SERPL-MCNC: 1 NG/DL — SIGNIFICANT CHANGE UP (ref 0.9–1.8)
TIBC SERPL-MCNC: 246 UG/DL — SIGNIFICANT CHANGE UP (ref 220–430)
TIBC SERPL-MCNC: 246 UG/DL — SIGNIFICANT CHANGE UP (ref 220–430)
TRANSFERRIN SERPL-MCNC: 172 MG/DL — LOW (ref 180–329)
TRANSFERRIN SERPL-MCNC: 172 MG/DL — LOW (ref 180–329)
TSH SERPL-MCNC: 6.62 UIU/ML — HIGH (ref 0.27–4.2)
TSH SERPL-MCNC: 6.62 UIU/ML — HIGH (ref 0.27–4.2)

## 2023-10-24 PROCEDURE — 99233 SBSQ HOSP IP/OBS HIGH 50: CPT

## 2023-10-24 RX ORDER — MAGNESIUM OXIDE 400 MG ORAL TABLET 241.3 MG
400 TABLET ORAL DAILY
Refills: 0 | Status: DISCONTINUED | OUTPATIENT
Start: 2023-10-24 | End: 2023-10-29

## 2023-10-24 RX ORDER — IRON SUCROSE 20 MG/ML
200 INJECTION, SOLUTION INTRAVENOUS EVERY 24 HOURS
Refills: 0 | Status: COMPLETED | OUTPATIENT
Start: 2023-10-24 | End: 2023-10-26

## 2023-10-24 RX ORDER — LACTOBACILLUS ACIDOPHILUS 100MM CELL
1 CAPSULE ORAL DAILY
Refills: 0 | Status: DISCONTINUED | OUTPATIENT
Start: 2023-10-24 | End: 2023-10-30

## 2023-10-24 RX ORDER — POTASSIUM CHLORIDE 20 MEQ
40 PACKET (EA) ORAL ONCE
Refills: 0 | Status: COMPLETED | OUTPATIENT
Start: 2023-10-24 | End: 2023-10-24

## 2023-10-24 RX ORDER — POTASSIUM CHLORIDE 20 MEQ
40 PACKET (EA) ORAL EVERY 4 HOURS
Refills: 0 | Status: COMPLETED | OUTPATIENT
Start: 2023-10-24 | End: 2023-10-24

## 2023-10-24 RX ADMIN — PANTOPRAZOLE SODIUM 40 MILLIGRAM(S): 20 TABLET, DELAYED RELEASE ORAL at 05:33

## 2023-10-24 RX ADMIN — Medication 50 MICROGRAM(S): at 05:34

## 2023-10-24 RX ADMIN — Medication 40 MILLIEQUIVALENT(S): at 13:46

## 2023-10-24 RX ADMIN — Medication 40 MILLIEQUIVALENT(S): at 12:29

## 2023-10-24 RX ADMIN — Medication 40 MILLIEQUIVALENT(S): at 17:39

## 2023-10-24 RX ADMIN — Medication 20 MILLIGRAM(S): at 22:45

## 2023-10-24 RX ADMIN — Medication 1 APPLICATION(S): at 17:42

## 2023-10-24 RX ADMIN — HEPARIN SODIUM 5000 UNIT(S): 5000 INJECTION INTRAVENOUS; SUBCUTANEOUS at 13:39

## 2023-10-24 RX ADMIN — Medication 1 APPLICATION(S): at 05:32

## 2023-10-24 RX ADMIN — ERYTHROPOIETIN 20000 UNIT(S): 10000 INJECTION, SOLUTION INTRAVENOUS; SUBCUTANEOUS at 12:30

## 2023-10-24 RX ADMIN — Medication 1 MILLIGRAM(S): at 12:30

## 2023-10-24 RX ADMIN — HEPARIN SODIUM 5000 UNIT(S): 5000 INJECTION INTRAVENOUS; SUBCUTANEOUS at 22:45

## 2023-10-24 RX ADMIN — IRON SUCROSE 200 MILLIGRAM(S): 20 INJECTION, SOLUTION INTRAVENOUS at 17:41

## 2023-10-24 RX ADMIN — HEPARIN SODIUM 5000 UNIT(S): 5000 INJECTION INTRAVENOUS; SUBCUTANEOUS at 05:34

## 2023-10-24 RX ADMIN — Medication 20 MILLIGRAM(S): at 05:33

## 2023-10-24 RX ADMIN — ATORVASTATIN CALCIUM 20 MILLIGRAM(S): 80 TABLET, FILM COATED ORAL at 22:45

## 2023-10-24 RX ADMIN — Medication 75 MILLIGRAM(S): at 05:34

## 2023-10-24 RX ADMIN — BUMETANIDE 2 MILLIGRAM(S): 0.25 INJECTION INTRAMUSCULAR; INTRAVENOUS at 22:44

## 2023-10-24 RX ADMIN — Medication 500 MILLIGRAM(S): at 13:40

## 2023-10-24 RX ADMIN — Medication 20 MILLIGRAM(S): at 13:39

## 2023-10-24 RX ADMIN — BUMETANIDE 2 MILLIGRAM(S): 0.25 INJECTION INTRAMUSCULAR; INTRAVENOUS at 13:40

## 2023-10-24 RX ADMIN — Medication 500 MILLIGRAM(S): at 05:34

## 2023-10-24 RX ADMIN — Medication 500 MILLIGRAM(S): at 22:45

## 2023-10-24 RX ADMIN — BUMETANIDE 2 MILLIGRAM(S): 0.25 INJECTION INTRAMUSCULAR; INTRAVENOUS at 05:32

## 2023-10-24 RX ADMIN — MAGNESIUM OXIDE 400 MG ORAL TABLET 400 MILLIGRAM(S): 241.3 TABLET ORAL at 13:46

## 2023-10-24 NOTE — PROGRESS NOTE ADULT - PROBLEM SELECTOR PLAN 1
HFpEF with RV dysfunction  - Clinically volume overloaded with lukewarm extremities  - GDMT: Limited by renal dysfunction. Would not start SGLT2i given eGFR <25 and neurogenic bladder with the need to self cath (being treated for UTI with PO Flagyl). No MRA due to elevated creatinine.   - Diuretics: continue IV Bumex 2 mg TID and metolazone 5 mg daily (if BUN continues to rise, will stop)  - Low threshold to start milrinone 0.125 mcg/kg/min for RV failure to help augment diuresis. No need for inotropes so far.   - Strict I/Os, daily standing weights.

## 2023-10-24 NOTE — PROGRESS NOTE ADULT - SUBJECTIVE AND OBJECTIVE BOX
NEPHROLOGY INTERVAL HPI/OVERNIGHT EVENTS:    Feels better   UO 2.3 L with eubanks   Getting IV BumexBid     MEDICATIONS  (STANDING):  atorvastatin 20 milliGRAM(s) Oral at bedtime  buMETAnide Injectable 2 milliGRAM(s) IV Push every 8 hours  clotrimazole 1% Cream 1 Application(s) Topical two times a day  epoetin yuliana-epbx (RETACRIT) Injectable 77572 Unit(s) SubCutaneous every 7 days  folic acid 1 milliGRAM(s) Oral daily  heparin   Injectable 5000 Unit(s) SubCutaneous every 8 hours  influenza  Vaccine (HIGH DOSE) 0.7 milliLiter(s) IntraMuscular once  levothyroxine 50 MICROGram(s) Oral daily  metolazone 5 milliGRAM(s) Oral daily  metoprolol succinate ER 75 milliGRAM(s) Oral daily  metroNIDAZOLE    Tablet 500 milliGRAM(s) Oral every 8 hours  pantoprazole    Tablet 40 milliGRAM(s) Oral before breakfast  sildenafil (REVATIO) 20 milliGRAM(s) Oral three times a day  Tyvaso (Treprostinil) 16 MICROGram(s) 16 MICROGram(s) Inhalation four times a day    MEDICATIONS  (PRN):  acetaminophen     Tablet .. 650 milliGRAM(s) Oral every 6 hours PRN Temp greater or equal to 38C (100.4F), Mild Pain (1 - 3)  melatonin 3 milliGRAM(s) Oral at bedtime PRN Insomnia  ondansetron Injectable 4 milliGRAM(s) IV Push every 8 hours PRN Nausea and/or Vomiting      Allergies    No Known Drug Allergies  adhesives (Other)    Intolerances        Vital Signs Last 24 Hrs  T(C): 36.7 (24 Oct 2023 08:45), Max: 36.8 (24 Oct 2023 04:13)  T(F): 98 (24 Oct 2023 08:45), Max: 98.2 (24 Oct 2023 04:13)  HR: 81 (24 Oct 2023 08:45) (70 - 81)  BP: 110/59 (24 Oct 2023 08:45) (110/59 - 113/56)  BP(mean): --  RR: 17 (24 Oct 2023 08:45) (17 - 19)  SpO2: 98% (24 Oct 2023 08:45) (94% - 99%)    Parameters below as of 24 Oct 2023 08:45  Patient On (Oxygen Delivery Method): nasal cannula  O2 Flow (L/min): 2    Daily     Daily   I&O's Detail    23 Oct 2023 07:01  -  24 Oct 2023 07:00  --------------------------------------------------------  IN:    Oral Fluid: 236 mL  Total IN: 236 mL    OUT:    Indwelling Catheter - Urethral (mL): 2350 mL  Total OUT: 2350 mL    Total NET: -2114 mL      24 Oct 2023 07:01  -  24 Oct 2023 12:54  --------------------------------------------------------  IN:    Oral Fluid: 240 mL  Total IN: 240 mL    OUT:    Indwelling Catheter - Urethral (mL): 375 mL  Total OUT: 375 mL    Total NET: -135 mL        I&O's Summary    23 Oct 2023 07:01  -  24 Oct 2023 07:00  --------------------------------------------------------  IN: 236 mL / OUT: 2350 mL / NET: -2114 mL    24 Oct 2023 07:01  -  24 Oct 2023 12:54  --------------------------------------------------------  IN: 240 mL / OUT: 375 mL / NET: -135 mL        PHYSICAL EXAM:      GENERAL: NAD, Feels better   HEAD:  Atraumatic, No edema   NECK: Supple, No JVD,   NERVOUS SYSTEM:  Alert & Oriented X3,  CHEST/LUNG: EAE , Improved aeration , no wheeze   HEART: Regular rate and rhythm; No rub   ABDOMEN: Soft, Nontender, Nondistended; Bowel sounds present  EXTREMITIES:  Pitting edema ---> better   + Eubanks ---> Clear   LABS:                        9.0    9.70  )-----------( 152      ( 23 Oct 2023 05:54 )             30.8     10-24    146<H>  |  106  |  102.5<H>  ----------------------------<  123<H>  3.1<L>   |  26.0  |  3.57<H>    Ca    7.2<L>      24 Oct 2023 05:15  Mg     1.8     10-24        Urinalysis Basic - ( 24 Oct 2023 05:15 )    Color: x / Appearance: x / SG: x / pH: x  Gluc: 123 mg/dL / Ketone: x  / Bili: x / Urobili: x   Blood: x / Protein: x / Nitrite: x   Leuk Esterase: x / RBC: x / WBC x   Sq Epi: x / Non Sq Epi: x / Bacteria: x      Magnesium: 1.8 mg/dL (10-24 @ 05:15)    Ferritin: 24 ng/mL (10.24.23 @ 05:15)  Iron Total: 13 ug/dL (10.24.23 @ 05:15)    % Saturation, Iron: 5 % (10.24.23 @ 05:15)   Troponin T, Serum: 0.11 ng/mL (10.23.23 @ 05:54)   Thyroid Stimulating Hormone, Serum: 6.62 uIU/mL (10.24.23 @ 05:15)     < from: US Renal (10.23.23 @ 15:43) >    ACC: [id number] EXAM:  US KIDNEY(S)   ORDERED BY: CARTER SINGH     PROCEDURE DATE:  10/23/2023          INTERPRETATION:  CLINICAL INFORMATION: Acute kidney injury    COMPARISON: Abdominal ultrasound dated 01/25/2022 and CT of the chest   dated 10/04/2023    TECHNIQUE: Sonography of the kidneys and bladder.    FINDINGS:  Right kidney: 9.1 cm. Increased parenchymal echogenicity. No renal mass,   hydronephrosis or calculi.    Left kidney: 8.6 cm. Increased parenchymal echogenicity. No renal mass,  hydronephrosis or calculi.    Urinary bladder: Decompressed by a Eubanks catheter.    IMPRESSION:  No hydronephrosis.  Mildly atrophic kidneys demonstrating increased parenchymal echogenicity,   consistent with chronic medical renal disease        --- End of Report ---            DELIA RAMOS MD; Attending Radiologist  This document has been electronically signed. Oct 23 2023  4:02PM    < end of copied text >    RADIOLOGY & ADDITIONAL TESTS:   NEPHROLOGY INTERVAL HPI/OVERNIGHT EVENTS:    Feels better   UO 2.3 L with eubanks   Getting IV Bumex Bid with metolazone     MEDICATIONS  (STANDING):  atorvastatin 20 milliGRAM(s) Oral at bedtime  buMETAnide Injectable 2 milliGRAM(s) IV Push every 8 hours  clotrimazole 1% Cream 1 Application(s) Topical two times a day  epoetin yuliana-epbx (RETACRIT) Injectable 12583 Unit(s) SubCutaneous every 7 days  folic acid 1 milliGRAM(s) Oral daily  heparin   Injectable 5000 Unit(s) SubCutaneous every 8 hours  influenza  Vaccine (HIGH DOSE) 0.7 milliLiter(s) IntraMuscular once  levothyroxine 50 MICROGram(s) Oral daily  metolazone 5 milliGRAM(s) Oral daily  metoprolol succinate ER 75 milliGRAM(s) Oral daily  metroNIDAZOLE    Tablet 500 milliGRAM(s) Oral every 8 hours  pantoprazole    Tablet 40 milliGRAM(s) Oral before breakfast  sildenafil (REVATIO) 20 milliGRAM(s) Oral three times a day  Tyvaso (Treprostinil) 16 MICROGram(s) 16 MICROGram(s) Inhalation four times a day    MEDICATIONS  (PRN):  acetaminophen     Tablet .. 650 milliGRAM(s) Oral every 6 hours PRN Temp greater or equal to 38C (100.4F), Mild Pain (1 - 3)  melatonin 3 milliGRAM(s) Oral at bedtime PRN Insomnia  ondansetron Injectable 4 milliGRAM(s) IV Push every 8 hours PRN Nausea and/or Vomiting      Allergies    No Known Drug Allergies  adhesives (Other)    Intolerances        Vital Signs Last 24 Hrs  T(C): 36.7 (24 Oct 2023 08:45), Max: 36.8 (24 Oct 2023 04:13)  T(F): 98 (24 Oct 2023 08:45), Max: 98.2 (24 Oct 2023 04:13)  HR: 81 (24 Oct 2023 08:45) (70 - 81)  BP: 110/59 (24 Oct 2023 08:45) (110/59 - 113/56)  BP(mean): --  RR: 17 (24 Oct 2023 08:45) (17 - 19)  SpO2: 98% (24 Oct 2023 08:45) (94% - 99%)    Parameters below as of 24 Oct 2023 08:45  Patient On (Oxygen Delivery Method): nasal cannula  O2 Flow (L/min): 2    Daily     Daily   I&O's Detail    23 Oct 2023 07:01  -  24 Oct 2023 07:00  --------------------------------------------------------  IN:    Oral Fluid: 236 mL  Total IN: 236 mL    OUT:    Indwelling Catheter - Urethral (mL): 2350 mL  Total OUT: 2350 mL    Total NET: -2114 mL      24 Oct 2023 07:01  -  24 Oct 2023 12:54  --------------------------------------------------------  IN:    Oral Fluid: 240 mL  Total IN: 240 mL    OUT:    Indwelling Catheter - Urethral (mL): 375 mL  Total OUT: 375 mL    Total NET: -135 mL        I&O's Summary    23 Oct 2023 07:01  -  24 Oct 2023 07:00  --------------------------------------------------------  IN: 236 mL / OUT: 2350 mL / NET: -2114 mL    24 Oct 2023 07:01  -  24 Oct 2023 12:54  --------------------------------------------------------  IN: 240 mL / OUT: 375 mL / NET: -135 mL        PHYSICAL EXAM:      GENERAL: NAD, Feels better   HEAD:  Atraumatic, No edema   NECK: Supple, No JVD,   NERVOUS SYSTEM:  Alert & Oriented X3,  CHEST/LUNG: EAE , Improved aeration , no wheeze   HEART: Regular rate and rhythm; No rub   ABDOMEN: Soft, Nontender, Nondistended; Bowel sounds present  EXTREMITIES:  Pitting edema ---> better   + Eubanks ---> Clear   LABS:                        9.0    9.70  )-----------( 152      ( 23 Oct 2023 05:54 )             30.8     10-24    146<H>  |  106  |  102.5<H>  ----------------------------<  123<H>  3.1<L>   |  26.0  |  3.57<H>    Ca    7.2<L>      24 Oct 2023 05:15  Mg     1.8     10-24        Urinalysis Basic - ( 24 Oct 2023 05:15 )    Color: x / Appearance: x / SG: x / pH: x  Gluc: 123 mg/dL / Ketone: x  / Bili: x / Urobili: x   Blood: x / Protein: x / Nitrite: x   Leuk Esterase: x / RBC: x / WBC x   Sq Epi: x / Non Sq Epi: x / Bacteria: x      Magnesium: 1.8 mg/dL (10-24 @ 05:15)    Ferritin: 24 ng/mL (10.24.23 @ 05:15)  Iron Total: 13 ug/dL (10.24.23 @ 05:15)    % Saturation, Iron: 5 % (10.24.23 @ 05:15)   Troponin T, Serum: 0.11 ng/mL (10.23.23 @ 05:54)   Thyroid Stimulating Hormone, Serum: 6.62 uIU/mL (10.24.23 @ 05:15)     < from: US Renal (10.23.23 @ 15:43) >    ACC: [id number] EXAM:  US KIDNEY(S)   ORDERED BY: CARTER SINGH     PROCEDURE DATE:  10/23/2023          INTERPRETATION:  CLINICAL INFORMATION: Acute kidney injury    COMPARISON: Abdominal ultrasound dated 01/25/2022 and CT of the chest   dated 10/04/2023    TECHNIQUE: Sonography of the kidneys and bladder.    FINDINGS:  Right kidney: 9.1 cm. Increased parenchymal echogenicity. No renal mass,   hydronephrosis or calculi.    Left kidney: 8.6 cm. Increased parenchymal echogenicity. No renal mass,  hydronephrosis or calculi.    Urinary bladder: Decompressed by a Eubanks catheter.    IMPRESSION:  No hydronephrosis.  Mildly atrophic kidneys demonstrating increased parenchymal echogenicity,   consistent with chronic medical renal disease        --- End of Report ---            DELIA RAMOS MD; Attending Radiologist  This document has been electronically signed. Oct 23 2023  4:02PM    < end of copied text >    RADIOLOGY & ADDITIONAL TESTS:

## 2023-10-24 NOTE — PROGRESS NOTE ADULT - SUBJECTIVE AND OBJECTIVE BOX
INCOMPLETE NOTE      Subjective:  No overnight events    Medications:  acetaminophen     Tablet .. 650 milliGRAM(s) Oral every 6 hours PRN  atorvastatin 20 milliGRAM(s) Oral at bedtime  buMETAnide Injectable 2 milliGRAM(s) IV Push every 8 hours  clotrimazole 1% Cream 1 Application(s) Topical two times a day  epoetin yuliana-epbx (RETACRIT) Injectable 45686 Unit(s) SubCutaneous every 7 days  folic acid 1 milliGRAM(s) Oral daily  heparin   Injectable 5000 Unit(s) SubCutaneous every 8 hours  influenza  Vaccine (HIGH DOSE) 0.7 milliLiter(s) IntraMuscular once  levothyroxine 50 MICROGram(s) Oral daily  melatonin 3 milliGRAM(s) Oral at bedtime PRN  metolazone 5 milliGRAM(s) Oral daily  metoprolol succinate ER 75 milliGRAM(s) Oral daily  metroNIDAZOLE    Tablet 500 milliGRAM(s) Oral every 8 hours  ondansetron Injectable 4 milliGRAM(s) IV Push every 8 hours PRN  pantoprazole    Tablet 40 milliGRAM(s) Oral before breakfast  sildenafil (REVATIO) 20 milliGRAM(s) Oral three times a day  Tyvaso (Treprostinil) 16 MICROGram(s) 16 MICROGram(s) Inhalation four times a day    Vitals:  T(C): 36.8 (10-24-23 @ 04:13), Max: 36.8 (10-24-23 @ 04:13)  HR: 70 (10-24-23 @ 04:13) (67 - 79)  BP: 110/65 (10-24-23 @ 04:13) (100/59 - 113/56)  BP(mean): --  RR: 18 (10-24-23 @ 04:13) (17 - 19)  SpO2: 99% (10-24-23 @ 04:13) (94% - 99%)    Daily     Daily Weight in k.1 (23 Oct 2023 09:31)        I&O's Summary    23 Oct 2023 07:01  -  24 Oct 2023 07:00  --------------------------------------------------------  IN: 236 mL / OUT: 2350 mL / NET: -2114 mL        Physical Exam:  Appearance: No Acute Distress  HEENT: JVP non elevated  Cardiovascular: RRR, Normal S1 S2, No murmurs/rubs/gallops  Respiratory: Clear to auscultation bilaterally  Gastrointestinal: Soft, Non-tender, non-distended	  Skin: no skin lesions  Neurologic: Non-focal  Extremities: No LE edema, warm and well perfused  Psychiatry: A & O x 3, Mood & affect appropriate      Labs:                        9.0    9.70  )-----------( 152      ( 23 Oct 2023 05:54 )             30.8     10-24    146<H>  |  106  |  102.5<H>  ----------------------------<  123<H>  3.1<L>   |  26.0  |  3.57<H>    Ca    7.2<L>      24 Oct 2023 05:15  Mg     1.8     10-24          CARDIAC MARKERS ( 23 Oct 2023 05:54 )  x     / 0.11 ng/mL / x     / x     / x                   Subjective:  No overnight events reported.   Pt feels well. Remains on O2.     Medications:  acetaminophen     Tablet .. 650 milliGRAM(s) Oral every 6 hours PRN  atorvastatin 20 milliGRAM(s) Oral at bedtime  buMETAnide Injectable 2 milliGRAM(s) IV Push every 8 hours  clotrimazole 1% Cream 1 Application(s) Topical two times a day  epoetin yuliana-epbx (RETACRIT) Injectable 57929 Unit(s) SubCutaneous every 7 days  folic acid 1 milliGRAM(s) Oral daily  heparin   Injectable 5000 Unit(s) SubCutaneous every 8 hours  influenza  Vaccine (HIGH DOSE) 0.7 milliLiter(s) IntraMuscular once  levothyroxine 50 MICROGram(s) Oral daily  melatonin 3 milliGRAM(s) Oral at bedtime PRN  metolazone 5 milliGRAM(s) Oral daily  metoprolol succinate ER 75 milliGRAM(s) Oral daily  metroNIDAZOLE    Tablet 500 milliGRAM(s) Oral every 8 hours  ondansetron Injectable 4 milliGRAM(s) IV Push every 8 hours PRN  pantoprazole    Tablet 40 milliGRAM(s) Oral before breakfast  sildenafil (REVATIO) 20 milliGRAM(s) Oral three times a day  Tyvaso (Treprostinil) 16 MICROGram(s) 16 MICROGram(s) Inhalation four times a day    Vitals:  T(C): 36.8 (10-24-23 @ 04:13), Max: 36.8 (10-24-23 @ 04:13)  HR: 70 (10-24-23 @ 04:13) (67 - 79)  BP: 110/65 (10-24-23 @ 04:13) (100/59 - 113/56)  BP(mean): --  RR: 18 (10-24-23 @ 04:13) (17 - 19)  SpO2: 99% (10-24-23 @ 04:13) (94% - 99%)    Daily     Daily Weight in k.1 (23 Oct 2023 09:31)        I&O's Summary    23 Oct 2023 07:01  -  24 Oct 2023 07:00  --------------------------------------------------------  IN: 236 mL / OUT: 2350 mL / NET: -2114 mL        Physical Exam:  Appearance: No Acute Distress  HEENT: JVP jaw level  Cardiovascular: irregularly irregular, 2-3/6 holosyst @ LLSB  Respiratory: decreased BS at the bases  Gastrointestinal: Soft, Non-tender, non-distended	  Skin: no skin lesions  Neurologic: Non-focal  Extremities: +1 b/l Le edema  Psychiatry: A & O x 3, Mood & affect appropriate      Labs:                        9.0    9.70  )-----------( 152      ( 23 Oct 2023 05:54 )             30.8     10-24    146<H>  |  106  |  102.5<H>  ----------------------------<  123<H>  3.1<L>   |  26.0  |  3.57<H>    Ca    7.2<L>      24 Oct 2023 05:15  Mg     1.8     10-24          CARDIAC MARKERS ( 23 Oct 2023 05:54 )  x     / 0.11 ng/mL / x     / x     / x

## 2023-10-24 NOTE — PROGRESS NOTE ADULT - SUBJECTIVE AND OBJECTIVE BOX
LETICIA MCCLURE  ----------------------------------------  The patient was seen earlier at bedside. Patient with heart failure. Offered no complaints. Denied dyspnea at rest.    Vital Signs Last 24 Hrs  T(C): 36.7 (24 Oct 2023 08:45), Max: 36.8 (24 Oct 2023 04:13)  T(F): 98 (24 Oct 2023 08:45), Max: 98.2 (24 Oct 2023 04:13)  HR: 81 (24 Oct 2023 08:45) (70 - 81)  BP: 110/59 (24 Oct 2023 08:45) (110/59 - 112/61)  BP(mean): --  RR: 17 (24 Oct 2023 08:45) (17 - 19)  SpO2: 98% (24 Oct 2023 08:45) (94% - 99%)    Parameters below as of 24 Oct 2023 08:45  Patient On (Oxygen Delivery Method): nasal cannula  O2 Flow (L/min): 2    PHYSICAL EXAMINATION:  ----------------------------------------  General appearance: No acute distress, Awake, Alert  HEENT: Normocephalic, Atraumatic, Conjunctiva clear, EOMI  Neck: Supple, No JVD, No tenderness  Lungs: No wheezes, No rales, Diminished breath sounds at the bases  Cardiovascular: S1S2, Regular rhythm  Abdomen: Soft, Nontender, Nondistended, No guarding/rebound, Positive bowel sounds  Extremities: No clubbing, No cyanosis, No calf tenderness, Lower extremity edema  Neuro: Strength equal bilaterally, No tremors  Psychiatric: Appropriate mood, Normal affect    LABORATORY STUDIES:  ----------------------------------------             9.0    9.70  )-----------( 152      ( 23 Oct 2023 05:54 )             30.8     10-24    146<H>  |  106  |  102.5<H>  ----------------------------<  123<H>  3.1<L>   |  26.0  |  3.57<H>    Ca    7.2<L>      24 Oct 2023 05:15  Mg     1.8     10-24    CARDIAC MARKERS ( 23 Oct 2023 05:54 )  x     / 0.11 ng/mL / x     / x     / x        10-23-23 @ 07:01  -  10-24-23 @ 07:00  --------------------------------------------------------  IN: 236 mL / OUT: 2350 mL / NET: -2114 mL    10-24-23 @ 07:01  -  10-24-23 @ 13:59  --------------------------------------------------------  IN: 240 mL / OUT: 375 mL / NET: -135 mL    Urinalysis Basic - ( 24 Oct 2023 05:15 )  Color: x / Appearance: x / SG: x / pH: x  Gluc: 123 mg/dL / Ketone: x  / Bili: x / Urobili: x   Blood: x / Protein: x / Nitrite: x   Leuk Esterase: x / RBC: x / WBC x   Sq Epi: x / Non Sq Epi: x / Bacteria: x    Culture - Blood (collected 21 Oct 2023 14:35)  Source: .Blood Blood-Peripheral  Gram Stain (22 Oct 2023 17:40):    Growth in aerobic bottle: Gram Positive Cocci in Clusters  Final Report (23 Oct 2023 10:21):    Growth in aerobic bottle: Staphylococcus epidermidis    Coagulase Negative Staphylococci isolated from a single blood culture set    may represent contamination.    Contact the Microbiology Department at [phone] if susceptibility    testing is clinically indicated.    Direct identification is available within approximately 3-5    hours either by Blood Panel Multiplexed PCR or Direct    MALDI-TOF. Details: [email or web address]  Organism: Blood Culture PCR (23 Oct 2023 10:21)  Organism: Blood Culture PCR (23 Oct 2023 10:21)    Culture - Blood (collected 21 Oct 2023 14:30)  Source: .Blood Blood-Peripheral  Preliminary Report (23 Oct 2023 22:01):    No growth at 48 Hours    MEDICATIONS  (STANDING):  atorvastatin 20 milliGRAM(s) Oral at bedtime  buMETAnide Injectable 2 milliGRAM(s) IV Push every 8 hours  clotrimazole 1% Cream 1 Application(s) Topical two times a day  epoetin yuliana-epbx (RETACRIT) Injectable 77411 Unit(s) SubCutaneous every 7 days  folic acid 1 milliGRAM(s) Oral daily  heparin   Injectable 5000 Unit(s) SubCutaneous every 8 hours  influenza  Vaccine (HIGH DOSE) 0.7 milliLiter(s) IntraMuscular once  iron sucrose Injectable 200 milliGRAM(s) IV Push every 24 hours  levothyroxine 50 MICROGram(s) Oral daily  magnesium oxide 400 milliGRAM(s) Oral daily  metolazone 5 milliGRAM(s) Oral daily  metoprolol succinate ER 75 milliGRAM(s) Oral daily  metroNIDAZOLE    Tablet 500 milliGRAM(s) Oral every 8 hours  pantoprazole    Tablet 40 milliGRAM(s) Oral before breakfast  potassium chloride    Tablet ER 40 milliEquivalent(s) Oral every 4 hours  sildenafil (REVATIO) 20 milliGRAM(s) Oral three times a day  Tyvaso (Treprostinil) 16 MICROGram(s) 16 MICROGram(s) Inhalation four times a day    MEDICATIONS  (PRN):  acetaminophen     Tablet .. 650 milliGRAM(s) Oral every 6 hours PRN Temp greater or equal to 38C (100.4F), Mild Pain (1 - 3)  melatonin 3 milliGRAM(s) Oral at bedtime PRN Insomnia  ondansetron Injectable 4 milliGRAM(s) IV Push every 8 hours PRN Nausea and/or Vomiting      ASSESSMENT / PLAN:  ----------------------------------------  78M with a history of severe pulmonary hypertension, atrial fibrillation, neurogenic bladder, hypothyroidism, hypertension, and gastroesophageal reflux who presented after episodes of falling at home and was found to have hypoxia requiring initiation of supplemental oxygen. Bumetanide was initiated for diuresis with improvement in the the hypoxia.    Acute hypoxic respiratory failure  - Multifactorial with pulmonary hypertension and heart failure  - Continue on supplemental oxygen as need with titration as tolerated  - Oxygen requirement decreased today    Heart failure  - Elevated troponin and BNP levels  - Heart Failure evaluation noted  - On bumetanide and metolazone  - Monitoring urine output and daily weights  - Echocardiogram noted ejection fraction of 50 to 55%, moderately reduced right ventricular systolic function and moderate pulmonary hypertension    Pulmonary hypertension  - On sildenafil and treprostinil    Chronic kidney disease with acute kidney injury  - Monitoring renal function on diuretic therapy  - Dose of bumetanide decreased    Anemia  - On epoetin yuliana and iron sucrose    Neurogenic bladder  - Previously self-catheterizing  - Now with Jain catheter  - For further follow up with Urology on an outpatient basis    Hypothyroidism  - On levothyroxine    Atrial fibrillation  - On metoprolol  - Not on anticoagulation at home    Fall  - Mechanical in nature  - No loss of consciousness  - Xray of the knee and hand were without fractures  - Physical Therapy evaluation noted and thought to benefit from further treatment at a rehabilitation facility    Bacteremia  - Blood culture (10/21) grew Staph epidermidis in one bottle  - Suspect contaminant  - Afebrile and without leukocytosis    Overall prognosis guarded

## 2023-10-24 NOTE — PROGRESS NOTE ADULT - SUBJECTIVE AND OBJECTIVE BOX
[facility], THE HEART CENTER                                   [address] [place] [address]                                                      PHONE: [phone]                                                         FAX: [phone]  [email or web address]  ---------------------------------------------------------------------------------------------------------------------------------    Overnight events/patient complaints:  Patient feeling well.  No chest pain or dyspnea.  Bumex IV increased yesterday with metolazone.     PAST MEDICAL & SURGICAL HISTORY:  HTN (hypertension)      HLD (hyperlipidemia)      Bladder-neck obstruction      Pulmonary hypertension      Chronic renal insufficiency      History of cirrhosis of liver      Atrial fibrillation      Hypothyroidism      MARIMAR (obstructive sleep apnea)      S/P TAVR (transcatheter aortic valve replacement)      History of cholecystectomy      H/O hernia repair      History of mitral valve repair      Pacemaker          No Known Drug Allergies  adhesives (Other)    MEDICATIONS  (STANDING):  atorvastatin 20 milliGRAM(s) Oral at bedtime  buMETAnide Injectable 2 milliGRAM(s) IV Push every 8 hours  clotrimazole 1% Cream 1 Application(s) Topical two times a day  epoetin yuliana-epbx (RETACRIT) Injectable 57029 Unit(s) SubCutaneous every 7 days  folic acid 1 milliGRAM(s) Oral daily  heparin   Injectable 5000 Unit(s) SubCutaneous every 8 hours  influenza  Vaccine (HIGH DOSE) 0.7 milliLiter(s) IntraMuscular once  levothyroxine 50 MICROGram(s) Oral daily  metolazone 5 milliGRAM(s) Oral daily  metoprolol succinate ER 75 milliGRAM(s) Oral daily  metroNIDAZOLE    Tablet 500 milliGRAM(s) Oral every 8 hours  pantoprazole    Tablet 40 milliGRAM(s) Oral before breakfast  potassium chloride    Tablet ER 40 milliEquivalent(s) Oral once  sildenafil (REVATIO) 20 milliGRAM(s) Oral three times a day  Tyvaso (Treprostinil) 16 MICROGram(s) 16 MICROGram(s) Inhalation four times a day    MEDICATIONS  (PRN):  acetaminophen     Tablet .. 650 milliGRAM(s) Oral every 6 hours PRN Temp greater or equal to 38C (100.4F), Mild Pain (1 - 3)  melatonin 3 milliGRAM(s) Oral at bedtime PRN Insomnia  ondansetron Injectable 4 milliGRAM(s) IV Push every 8 hours PRN Nausea and/or Vomiting      Vital Signs Last 24 Hrs  T(C): 36.7 (24 Oct 2023 08:45), Max: 36.8 (24 Oct 2023 04:13)  T(F): 98 (24 Oct 2023 08:45), Max: 98.2 (24 Oct 2023 04:13)  HR: 81 (24 Oct 2023 08:45) (67 - 81)  BP: 110/59 (24 Oct 2023 08:45) (100/59 - 113/56)  BP(mean): --  RR: 17 (24 Oct 2023 08:45) (17 - 19)  SpO2: 98% (24 Oct 2023 08:45) (94% - 99%)    Parameters below as of 24 Oct 2023 08:45  Patient On (Oxygen Delivery Method): nasal cannula  O2 Flow (L/min): 2    ICU Vital Signs Last 24 Hrs  LETICIA MCCLURE  I&O's Detail    23 Oct 2023 07:01  -  24 Oct 2023 07:00  --------------------------------------------------------  IN:    Oral Fluid: 236 mL  Total IN: 236 mL    OUT:    Indwelling Catheter - Urethral (mL): 2350 mL  Total OUT: 2350 mL    Total NET: -2114 mL        I&O's Summary    23 Oct 2023 07:01  -  24 Oct 2023 07:00  --------------------------------------------------------  IN: 236 mL / OUT: 2350 mL / NET: -2114 mL      Drug Dosing Weight  LETICIA MCCLURE      PHYSICAL EXAM:  General: Appears well developed, alert and cooperative.  HEENT: Head; normocephalic, atraumatic.  Eyes: Pupils reactive, cornea wnl.  Neck: Supple, no nodes adenopathy, no JVD, no carotid bruit  CARDIOVASCULAR: Normal S1 and S2, 2/6 holosystolic murmur at LLSB  LUNGS: No rales, rhonchi or wheeze. Normal breath sounds bilaterally.  ABDOMEN: Soft, nontender, nondistended  EXTREMITIES: Minimal LE edema; +saddle edema  SKIN: warm and dry with normal turgor.  NEURO: Alert/oriented x 3/normal motor exam.   PSYCH: normal affect.        LABS:                        9.0    9.70  )-----------( 152      ( 23 Oct 2023 05:54 )             30.8     10-24    146<H>  |  106  |  102.5<H>  ----------------------------<  123<H>  3.1<L>   |  26.0  |  3.57<H>    Ca    7.2<L>      24 Oct 2023 05:15  Mg     1.8     10-24      LETICIA MCCLURE  CARDIAC MARKERS ( 23 Oct 2023 05:54 )  x     / 0.11 ng/mL / x     / x     / x            Urinalysis Basic - ( 24 Oct 2023 05:15 )    Color: x / Appearance: x / SG: x / pH: x  Gluc: 123 mg/dL / Ketone: x  / Bili: x / Urobili: x   Blood: x / Protein: x / Nitrite: x   Leuk Esterase: x / RBC: x / WBC x   Sq Epi: x / Non Sq Epi: x / Bacteria: x        RADIOLOGY & ADDITIONAL STUDIES:    INTERPRETATION OF TELEMETRY (personally reviewed):    ECG:    ECHO:Summary:   1. Endocardial visualization was enhanced with intravenous echo contrast.   2. Left ventricular ejection fraction, by visual estimation, is 50 to   55%.   3. Normal global left ventricular systolic function.   4. Severely enlarged right atrium.   5. The mitral in-flow pattern reveals no discernable A-wave, therefore   no comment on diastolic function canbe made.   6. There is mild concentric left ventricular hypertrophy.   7. Severely enlarged right ventricle.   8. Moderately reduced RV systolic function.   9. Trace mitral valve regurgitation.  10. Moderate-severe tricuspid regurgitation.  11. TAVR in the aortic position.  12. Trivial paravalvular leak.  13. Peak aortic valve gradient is 18.5 mmHg and the mean gradient is 7.1   mmHg, which is probably normal in the setting of a prosthetic aortic   valve.      STRESS TEST:    CARDIAC CATHETERIZATION:    ASSESSMENT AND PLAN:  In summary, LETICIA MCCLURE is an 78y Male with past medical history significant for HTN, HLD, neurogenic bladder, afib not on AC due to GI bleed, MV repair, JEANMARIE ligation, severe pulmonary HTN, TAVR, [facility] PPM, LVEF 50-55%, CKD p/w mechanical fall found to have worsening right heart failure.    Mechanical fall/right heart failure    -continue IV bumex  -metolazone 5mg daily given 30 minutes before morning bumex infusion    HLD- continue atorvastatin    pulmonary htn- continue sildenafil and tyvaso    Afib- not on anticoagulation due to Gi bleed        Thank you for allowing [facility] to participate in the care of this patient.  Please feel free to call with any questions or concerns.

## 2023-10-24 NOTE — PROGRESS NOTE ADULT - ASSESSMENT
Initial HF c/s: June De La Torre MD  Outpt PAH expert: Kandis Zee MD    79 y/o M with a history of RV dysfunction in the setting of severe PH (followed by Dr. Kandis Singh at [place] on sildenafil and recently started on Tyvaso), MV repair and JEANMARIE ligation in 2004 at [facility], recent TAVR at [place], s/p PPM, AFib not on AC due to GI bleed, HTN, HLD, CKD (b/l Cr 2.5-3), and neurogenic bladder, who presented after a fall at home.     While in the ED patient was noted to be hypotensive and hypoxic into the Low 80s. Patient was placed on oxygen with noted improvement. Pro-BNP was 15,886 on admission. He has been diuresing on intermittent IV Bumex and was noted to have worsening renal function. HF consulted for further management.     Cardiac Studies:  10/22/23 TTE: LVIDd 4.62cm, LVEF 50-55%, severe RVE with mod RV dysfunction, normal LA size, severe SONAM, trace MR, mod-severe TR, TAVR with normal gradients, trivial paravalvular leak, est RAP 15 mmHg, est PASP 53.7 mmHg.    R/LHC with Dr Fernandez at [facility] 9/18/23 (report not available): normal cors except for mRCA 30%, no hemodynamic significant response to nitric

## 2023-10-24 NOTE — PROGRESS NOTE ADULT - ASSESSMENT
78y Male PMHx HTN, HLD, neurogenic bladder, AFib not on AC due to GI bleed, MV repair 2004 at [facility] and JEANMARIE ligation, severe pulm HTN follwed at [place] on sildenafil and Tyvaso, ILR, TAVR, [facility] PPM,   Chronic systolic/diastolic HF LVEF 45%, CKD pw mechanical fall without syncope. Pt also notes worsening LE edema and increased weight gain.   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   Neurogenic bladder with chronic eubanks  Baseline creat 2.5 - 3   ECHO 10/22 noted   Improved symptomatic fluid overload - Was on Bumex 1 mg po daily PTA   Responding to current diuretic regimen   Need to accept some degree of enhanced azotemia in the setting of his right ventricular pre-load dependence   Renal sono 1/23 - Atrophic echogenic kidneys , w/o hydro  Strict I/O   follow labs   Cardio follow up noted   Check UA , urine P/C ratio    Anemia - Added Epogen and folate ,   Low iron --> Add Venofer   TFT's OK    78y Male PMHx HTN, HLD, neurogenic bladder, AFib not on AC due to GI bleed, MV repair 2004 at [facility] and JEANMARIE ligation, severe pulm HTN follwed at [place] on sildenafil and Tyvaso, ILR, TAVR, [facility] PPM,   Chronic systolic/diastolic HF LVEF 45%, CKD pw mechanical fall without syncope. Pt also notes worsening LE edema and increased weight gain.   Right and Left Cath Dr Fernandez [facility] 9/18/23 Scanned into 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

## 2023-10-25 DIAGNOSIS — R19.7 DIARRHEA, UNSPECIFIED: ICD-10-CM

## 2023-10-25 DIAGNOSIS — R33.9 RETENTION OF URINE, UNSPECIFIED: ICD-10-CM

## 2023-10-25 LAB
ANION GAP SERPL CALC-SCNC: 14 MMOL/L — SIGNIFICANT CHANGE UP (ref 5–17)
ANION GAP SERPL CALC-SCNC: 14 MMOL/L — SIGNIFICANT CHANGE UP (ref 5–17)
APPEARANCE UR: ABNORMAL
APPEARANCE UR: ABNORMAL
BACTERIA # UR AUTO: ABNORMAL
BACTERIA # UR AUTO: ABNORMAL
BILIRUB UR-MCNC: NEGATIVE — SIGNIFICANT CHANGE UP
BILIRUB UR-MCNC: NEGATIVE — SIGNIFICANT CHANGE UP
BUN SERPL-MCNC: 103.4 MG/DL — HIGH (ref 8–20)
BUN SERPL-MCNC: 103.4 MG/DL — HIGH (ref 8–20)
CALCIUM SERPL-MCNC: 7.2 MG/DL — LOW (ref 8.4–10.5)
CALCIUM SERPL-MCNC: 7.2 MG/DL — LOW (ref 8.4–10.5)
CHLORIDE SERPL-SCNC: 107 MMOL/L — SIGNIFICANT CHANGE UP (ref 96–108)
CHLORIDE SERPL-SCNC: 107 MMOL/L — SIGNIFICANT CHANGE UP (ref 96–108)
CO2 SERPL-SCNC: 26 MMOL/L — SIGNIFICANT CHANGE UP (ref 22–29)
CO2 SERPL-SCNC: 26 MMOL/L — SIGNIFICANT CHANGE UP (ref 22–29)
COLOR SPEC: YELLOW — SIGNIFICANT CHANGE UP
COLOR SPEC: YELLOW — SIGNIFICANT CHANGE UP
CREAT ?TM UR-MCNC: 28 MG/DL — SIGNIFICANT CHANGE UP
CREAT ?TM UR-MCNC: 28 MG/DL — SIGNIFICANT CHANGE UP
CREAT SERPL-MCNC: 3.29 MG/DL — HIGH (ref 0.5–1.3)
CREAT SERPL-MCNC: 3.29 MG/DL — HIGH (ref 0.5–1.3)
DIFF PNL FLD: ABNORMAL
DIFF PNL FLD: ABNORMAL
EGFR: 19 ML/MIN/1.73M2 — LOW
EGFR: 19 ML/MIN/1.73M2 — LOW
EPI CELLS # UR: NEGATIVE — SIGNIFICANT CHANGE UP
EPI CELLS # UR: NEGATIVE — SIGNIFICANT CHANGE UP
GLUCOSE SERPL-MCNC: 98 MG/DL — SIGNIFICANT CHANGE UP (ref 70–99)
GLUCOSE SERPL-MCNC: 98 MG/DL — SIGNIFICANT CHANGE UP (ref 70–99)
GLUCOSE UR QL: NEGATIVE — SIGNIFICANT CHANGE UP
GLUCOSE UR QL: NEGATIVE — SIGNIFICANT CHANGE UP
HCT VFR BLD CALC: 30.4 % — LOW (ref 39–50)
HCT VFR BLD CALC: 30.4 % — LOW (ref 39–50)
HGB BLD-MCNC: 8.6 G/DL — LOW (ref 13–17)
HGB BLD-MCNC: 8.6 G/DL — LOW (ref 13–17)
KETONES UR-MCNC: NEGATIVE — SIGNIFICANT CHANGE UP
KETONES UR-MCNC: NEGATIVE — SIGNIFICANT CHANGE UP
LEUKOCYTE ESTERASE UR-ACNC: ABNORMAL
LEUKOCYTE ESTERASE UR-ACNC: ABNORMAL
MAGNESIUM SERPL-MCNC: 1.9 MG/DL — SIGNIFICANT CHANGE UP (ref 1.8–2.6)
MAGNESIUM SERPL-MCNC: 1.9 MG/DL — SIGNIFICANT CHANGE UP (ref 1.8–2.6)
MCHC RBC-ENTMCNC: 24.6 PG — LOW (ref 27–34)
MCHC RBC-ENTMCNC: 24.6 PG — LOW (ref 27–34)
MCHC RBC-ENTMCNC: 28.3 GM/DL — LOW (ref 32–36)
MCHC RBC-ENTMCNC: 28.3 GM/DL — LOW (ref 32–36)
MCV RBC AUTO: 86.9 FL — SIGNIFICANT CHANGE UP (ref 80–100)
MCV RBC AUTO: 86.9 FL — SIGNIFICANT CHANGE UP (ref 80–100)
NITRITE UR-MCNC: POSITIVE
NITRITE UR-MCNC: POSITIVE
PH UR: 8 — SIGNIFICANT CHANGE UP (ref 5–8)
PH UR: 8 — SIGNIFICANT CHANGE UP (ref 5–8)
PLATELET # BLD AUTO: 139 K/UL — LOW (ref 150–400)
PLATELET # BLD AUTO: 139 K/UL — LOW (ref 150–400)
POTASSIUM SERPL-MCNC: 3.4 MMOL/L — LOW (ref 3.5–5.3)
POTASSIUM SERPL-MCNC: 3.4 MMOL/L — LOW (ref 3.5–5.3)
POTASSIUM SERPL-SCNC: 3.4 MMOL/L — LOW (ref 3.5–5.3)
POTASSIUM SERPL-SCNC: 3.4 MMOL/L — LOW (ref 3.5–5.3)
PROT ?TM UR-MCNC: 13 MG/DL — HIGH (ref 0–12)
PROT ?TM UR-MCNC: 13 MG/DL — HIGH (ref 0–12)
PROT UR-MCNC: 30 MG/DL
PROT UR-MCNC: 30 MG/DL
PROT/CREAT UR-RTO: 0.5 RATIO — HIGH
PROT/CREAT UR-RTO: 0.5 RATIO — HIGH
RBC # BLD: 3.5 M/UL — LOW (ref 4.2–5.8)
RBC # BLD: 3.5 M/UL — LOW (ref 4.2–5.8)
RBC # FLD: 18.1 % — HIGH (ref 10.3–14.5)
RBC # FLD: 18.1 % — HIGH (ref 10.3–14.5)
RBC CASTS # UR COMP ASSIST: ABNORMAL /HPF (ref 0–4)
RBC CASTS # UR COMP ASSIST: ABNORMAL /HPF (ref 0–4)
SODIUM SERPL-SCNC: 147 MMOL/L — HIGH (ref 135–145)
SODIUM SERPL-SCNC: 147 MMOL/L — HIGH (ref 135–145)
SP GR SPEC: 1.01 — SIGNIFICANT CHANGE UP (ref 1.01–1.02)
SP GR SPEC: 1.01 — SIGNIFICANT CHANGE UP (ref 1.01–1.02)
UROBILINOGEN FLD QL: NEGATIVE — SIGNIFICANT CHANGE UP
UROBILINOGEN FLD QL: NEGATIVE — SIGNIFICANT CHANGE UP
WBC # BLD: 7.64 K/UL — SIGNIFICANT CHANGE UP (ref 3.8–10.5)
WBC # BLD: 7.64 K/UL — SIGNIFICANT CHANGE UP (ref 3.8–10.5)
WBC # FLD AUTO: 7.64 K/UL — SIGNIFICANT CHANGE UP (ref 3.8–10.5)
WBC # FLD AUTO: 7.64 K/UL — SIGNIFICANT CHANGE UP (ref 3.8–10.5)
WBC UR QL: ABNORMAL /HPF (ref 0–5)
WBC UR QL: ABNORMAL /HPF (ref 0–5)

## 2023-10-25 PROCEDURE — 99233 SBSQ HOSP IP/OBS HIGH 50: CPT

## 2023-10-25 RX ORDER — POTASSIUM CHLORIDE 20 MEQ
40 PACKET (EA) ORAL ONCE
Refills: 0 | Status: COMPLETED | OUTPATIENT
Start: 2023-10-25 | End: 2023-10-25

## 2023-10-25 RX ADMIN — ATORVASTATIN CALCIUM 20 MILLIGRAM(S): 80 TABLET, FILM COATED ORAL at 22:21

## 2023-10-25 RX ADMIN — Medication 1 TABLET(S): at 11:07

## 2023-10-25 RX ADMIN — BUMETANIDE 2 MILLIGRAM(S): 0.25 INJECTION INTRAMUSCULAR; INTRAVENOUS at 06:35

## 2023-10-25 RX ADMIN — Medication 1 APPLICATION(S): at 17:46

## 2023-10-25 RX ADMIN — MAGNESIUM OXIDE 400 MG ORAL TABLET 400 MILLIGRAM(S): 241.3 TABLET ORAL at 11:07

## 2023-10-25 RX ADMIN — Medication 500 MILLIGRAM(S): at 13:01

## 2023-10-25 RX ADMIN — Medication 20 MILLIGRAM(S): at 13:00

## 2023-10-25 RX ADMIN — Medication 500 MILLIGRAM(S): at 22:20

## 2023-10-25 RX ADMIN — Medication 500 MILLIGRAM(S): at 06:12

## 2023-10-25 RX ADMIN — HEPARIN SODIUM 5000 UNIT(S): 5000 INJECTION INTRAVENOUS; SUBCUTANEOUS at 06:13

## 2023-10-25 RX ADMIN — Medication 20 MILLIGRAM(S): at 22:21

## 2023-10-25 RX ADMIN — BUMETANIDE 2 MILLIGRAM(S): 0.25 INJECTION INTRAMUSCULAR; INTRAVENOUS at 13:01

## 2023-10-25 RX ADMIN — HEPARIN SODIUM 5000 UNIT(S): 5000 INJECTION INTRAVENOUS; SUBCUTANEOUS at 13:01

## 2023-10-25 RX ADMIN — PANTOPRAZOLE SODIUM 40 MILLIGRAM(S): 20 TABLET, DELAYED RELEASE ORAL at 06:12

## 2023-10-25 RX ADMIN — Medication 75 MILLIGRAM(S): at 06:13

## 2023-10-25 RX ADMIN — IRON SUCROSE 200 MILLIGRAM(S): 20 INJECTION, SOLUTION INTRAVENOUS at 17:46

## 2023-10-25 RX ADMIN — Medication 1 MILLIGRAM(S): at 11:07

## 2023-10-25 RX ADMIN — Medication 20 MILLIGRAM(S): at 06:12

## 2023-10-25 RX ADMIN — Medication 1 APPLICATION(S): at 06:12

## 2023-10-25 RX ADMIN — HEPARIN SODIUM 5000 UNIT(S): 5000 INJECTION INTRAVENOUS; SUBCUTANEOUS at 22:22

## 2023-10-25 RX ADMIN — Medication 40 MILLIEQUIVALENT(S): at 11:07

## 2023-10-25 RX ADMIN — Medication 50 MICROGRAM(S): at 06:12

## 2023-10-25 NOTE — PROGRESS NOTE ADULT - SUBJECTIVE AND OBJECTIVE BOX
[facility], THE HEART CENTER                                   [address] [place] [address]                                                      PHONE: [phone]                                                         FAX: [phone]  [email or web address]  ---------------------------------------------------------------------------------------------------------------------------------    Overnight events/patient complaints:  Pt feels better     No Known Drug Allergies  adhesives (Other)    MEDICATIONS  (STANDING):  atorvastatin 20 milliGRAM(s) Oral at bedtime  buMETAnide Injectable 2 milliGRAM(s) IV Push every 8 hours  clotrimazole 1% Cream 1 Application(s) Topical two times a day  epoetin yuliana-epbx (RETACRIT) Injectable 95713 Unit(s) SubCutaneous every 7 days  folic acid 1 milliGRAM(s) Oral daily  heparin   Injectable 5000 Unit(s) SubCutaneous every 8 hours  influenza  Vaccine (HIGH DOSE) 0.7 milliLiter(s) IntraMuscular once  iron sucrose Injectable 200 milliGRAM(s) IV Push every 24 hours  lactobacillus acidophilus 1 Tablet(s) Oral daily  levothyroxine 50 MICROGram(s) Oral daily  magnesium oxide 400 milliGRAM(s) Oral daily  metolazone 5 milliGRAM(s) Oral daily  metoprolol succinate ER 75 milliGRAM(s) Oral daily  metroNIDAZOLE    Tablet 500 milliGRAM(s) Oral every 8 hours  pantoprazole    Tablet 40 milliGRAM(s) Oral before breakfast  potassium chloride    Tablet ER 40 milliEquivalent(s) Oral once  sildenafil (REVATIO) 20 milliGRAM(s) Oral three times a day  Tyvaso (Treprostinil) 16 MICROGram(s) 16 MICROGram(s) Inhalation four times a day    MEDICATIONS  (PRN):  acetaminophen     Tablet .. 650 milliGRAM(s) Oral every 6 hours PRN Temp greater or equal to 38C (100.4F), Mild Pain (1 - 3)  melatonin 3 milliGRAM(s) Oral at bedtime PRN Insomnia  ondansetron Injectable 4 milliGRAM(s) IV Push every 8 hours PRN Nausea and/or Vomiting      Vital Signs Last 24 Hrs  T(C): 36.6 (25 Oct 2023 08:47), Max: 36.8 (24 Oct 2023 16:52)  T(F): 97.8 (25 Oct 2023 08:47), Max: 98.3 (24 Oct 2023 16:52)  HR: 89 (25 Oct 2023 08:47) (82 - 96)  BP: 96/54 (25 Oct 2023 08:47) (92/57 - 105/56)  BP(mean): --  RR: 19 (25 Oct 2023 08:47) (18 - 19)  SpO2: 97% (25 Oct 2023 08:47) (95% - 97%)    Parameters below as of 25 Oct 2023 08:47  Patient On (Oxygen Delivery Method): nasal cannula  O2 Flow (L/min): 2    ICU Vital Signs Last 24 Hrs  LETICIA MCCLURE  I&O's Detail    24 Oct 2023 07:01  -  25 Oct 2023 07:00  --------------------------------------------------------  IN:    Oral Fluid: 742 mL  Total IN: 742 mL    OUT:    Indwelling Catheter - Urethral (mL): 1525 mL    Voided (mL): 500 mL  Total OUT: 2025 mL    Total NET: -1283 mL      25 Oct 2023 07:01  -  25 Oct 2023 10:03  --------------------------------------------------------  IN:  Total IN: 0 mL    OUT:    Indwelling Catheter - Urethral (mL): 400 mL  Total OUT: 400 mL    Total NET: -400 mL        Drug Dosing Weight  LETICIA MCCLURE      PHYSICAL EXAM:  General:  alert and cooperative.  HEENT: Head; normocephalic, atraumatic.  Eyes: Pupils reactive, cornea wnl.  Neck: Supple, no nodes adenopathy, no NVD or carotid bruit or thyromegaly.  CARDIOVASCULAR: Normal S1 and S2, No murmur, rub, gallop or lift.   LUNGS: No rales, rhonchi or wheeze. Normal breath sounds bilaterally.  ABDOMEN: Soft, nontender without mass or organomegaly. bowel sounds normoactive.  EXTREMITIES: +1 edema. Distal pulses wnl.   SKIN: warm and dry with normal turgor.  NEURO: Alert/oriented x 3/normal motor exam. No pathologic reflexes.    PSYCH: normal affect.        LABS:                        8.6    7.64  )-----------( 139      ( 25 Oct 2023 04:53 )             30.4     10-25    147<H>  |  107  |  103.4<H>  ----------------------------<  98  3.4<L>   |  26.0  |  3.29<H>    Ca    7.2<L>      25 Oct 2023 04:53  Mg     1.9     10-25      LETICIA KAMI        Urinalysis Basic - ( 25 Oct 2023 09:42 )    Color: x / Appearance: x / SG: x / pH: x  Gluc: x / Ketone: x  / Bili: x / Urobili: x   Blood: x / Protein: 30 mg/dL / Nitrite: x   Leuk Esterase: x / RBC: x / WBC x   Sq Epi: x / Non Sq Epi: x / Bacteria: x        RADIOLOGY & ADDITIONAL STUDIES:      < from: TTE Echo Complete w/ Contrast w/ Doppler (10.22.23 @ 13:18) >  Summary:   1. Endocardial visualization was enhanced with intravenous echo contrast.   2. Left ventricular ejection fraction, by visual estimation, is 50 to   55%.   3. Normal global left ventricular systolic function.   4. Severely enlarged right atrium.   5. The mitral in-flow pattern reveals no discernable A-wave, therefore   no comment on diastolic function canbe made.   6. There is mild concentric left ventricular hypertrophy.   7. Severely enlarged right ventricle.   8. Moderately reduced RV systolic function.   9. Trace mitral valve regurgitation.  10. Moderate-severe tricuspid regurgitation.  11. TAVR in the aortic position.  12. Trivial paravalvular leak.  13. Peak aortic valve gradient is 18.5 mmHg and the mean gradient is 7.1   mmHg, which is probably normal in the setting of a prosthetic aortic   valve.  14. Moderately dilated pulmonary artery.  15. Estimated pulmonary artery systolic pressure is 53.7 mmHg assuming a   right atrial pressure of 15 mmHg, which is consistent with moderate   pulmonary hypertension.    MD Hermes Electronically signed on 10/22/2023 at 2:09:08 PM    < end of copied text >      INTERPRETATION OF TELEMETRY (personally reviewed): no events    In summary, LETICIA MCCLURE is an 78y Male with past medical history significant for HTN, HLD, neurogenic bladder, AFib not on AC due to GI bleed, MV repair 2004 at [facility] and JEANMARIE ligation, severe pulm HTN follwed at [place] on sildenafil and Tyvaso, ILR, TAVR, [facility] PPM,   Chronic systolic/diastolic HF LVEF 45%, CKD pw mechanical fall without syncope. Pt also notes worsening LE edema and increased weight gain.   Right and Left Cath Dr Fernandez [facility] 9/18/23 Scanned into [facility] EMR: normal cors except for mRCA 30%, no hemodynamic significant response to nitric oxide     Mechanical fall in the setting of acute on Chronic systolic/diastolic HF    1) IV bumex with adequate diuresis. Can consider inotropes if needed   2) Appreciated CHF FU  3) NSVT likely driven by hypervolemia

## 2023-10-25 NOTE — PROGRESS NOTE ADULT - ASSESSMENT
CKD IV: Baseline creat 2.5 - 3  BIV CM  (R > L)==> fluid overload improved   Neurogenic bladder with chronic eubanks  Renal sono 1/23 - Atrophic echogenic kidneys , w/o hydro  - avoid potential nephrotoxins  - cont diuretics and adjust as clinically needed--> expect azotemia  - if fails med mgmt would need to consider RRT     Anemia :  - cont TAD and IV Fe  - target Hgb > 10.0

## 2023-10-25 NOTE — PROGRESS NOTE ADULT - SUBJECTIVE AND OBJECTIVE BOX
NEPHROLOGY INTERVAL HPI/OVERNIGHT EVENTS:  pt clinically unchanged   still with loose BMs    MEDICATIONS  (STANDING):  atorvastatin 20 milliGRAM(s) Oral at bedtime  buMETAnide Injectable 2 milliGRAM(s) IV Push every 8 hours  clotrimazole 1% Cream 1 Application(s) Topical two times a day  epoetin yuliana-epbx (RETACRIT) Injectable 45293 Unit(s) SubCutaneous every 7 days  folic acid 1 milliGRAM(s) Oral daily  heparin   Injectable 5000 Unit(s) SubCutaneous every 8 hours  influenza  Vaccine (HIGH DOSE) 0.7 milliLiter(s) IntraMuscular once  iron sucrose Injectable 200 milliGRAM(s) IV Push every 24 hours  lactobacillus acidophilus 1 Tablet(s) Oral daily  levothyroxine 50 MICROGram(s) Oral daily  magnesium oxide 400 milliGRAM(s) Oral daily  metolazone 5 milliGRAM(s) Oral daily  metoprolol succinate ER 75 milliGRAM(s) Oral daily  metroNIDAZOLE    Tablet 500 milliGRAM(s) Oral every 8 hours  pantoprazole    Tablet 40 milliGRAM(s) Oral before breakfast  sildenafil (REVATIO) 20 milliGRAM(s) Oral three times a day  Tyvaso (Treprostinil) 16 MICROGram(s) 16 MICROGram(s) Inhalation four times a day    MEDICATIONS  (PRN):  acetaminophen     Tablet .. 650 milliGRAM(s) Oral every 6 hours PRN Temp greater or equal to 38C (100.4F), Mild Pain (1 - 3)  melatonin 3 milliGRAM(s) Oral at bedtime PRN Insomnia  ondansetron Injectable 4 milliGRAM(s) IV Push every 8 hours PRN Nausea and/or Vomiting      Allergies    No Known Drug Allergies  adhesives (Other)          Vital Signs Last 24 Hrs  T(C): 36.6 (25 Oct 2023 08:47), Max: 36.8 (24 Oct 2023 16:52)  T(F): 97.8 (25 Oct 2023 08:47), Max: 98.3 (24 Oct 2023 16:52)  HR: 89 (25 Oct 2023 08:47) (82 - 96)  BP: 96/54 (25 Oct 2023 08:47) (92/57 - 105/56)  BP(mean): --  RR: 19 (25 Oct 2023 08:47) (18 - 19)  SpO2: 97% (25 Oct 2023 08:47) (95% - 97%)    Parameters below as of 25 Oct 2023 08:47  Patient On (Oxygen Delivery Method): nasal cannula  O2 Flow (L/min): 2      PHYSICAL EXAM:  Frail, debilitated  NECK: Supple, No JVD  NERVOUS SYSTEM:  Alert & Oriented X3, no asterixis  CHEST/LUNG: Clear bilaterally, diminished BS  HEART: Regular rate and rhythm; No rub  ABDOMEN: Soft, Nontender, +BS  EXTREMITIES:  2+ Peripheral Pulses, + tr dependent edema  SKIN: No rashes or lesions  : Jain      LABS:                        8.6    7.64  )-----------( 139      ( 25 Oct 2023 04:53 )             30.4     10    147<H>  |  107  |  103.4<H>  ----------------------------<  98  3.4<L>   |  26.0  |  3.29<H>    Ca    7.2<L>      25 Oct 2023 04:53  Mg     1.9     10-25        Urinalysis Basic - ( 25 Oct 2023 09:42 )    Color: Yellow / Appearance: very cloudy / S.010 / pH: x  Gluc: x / Ketone: Negative  / Bili: Negative / Urobili: Negative   Blood: x / Protein: 30 mg/dL / Nitrite: Positive   Leuk Esterase: Moderate / RBC: 3-5 /HPF / WBC 6-10 /HPF   Sq Epi: x / Non Sq Epi: x / Bacteria: Many      Magnesium: 1.9 mg/dL (10-25 @ 04:53)      RADIOLOGY & ADDITIONAL TESTS:  < from: US Renal (10.23.23 @ 15:43) >    ACC: [id number] EXAM:  US KIDNEY(S)   ORDERED BY: CARTER SINGH     PROCEDURE DATE:  10/23/2023          INTERPRETATION:  CLINICAL INFORMATION: Acute kidney injury    COMPARISON: Abdominal ultrasound dated 2022 and CT of the chest   dated 10/04/2023    TECHNIQUE: Sonography of the kidneys and bladder.    FINDINGS:  Right kidney: 9.1 cm. Increased parenchymal echogenicity. No renal mass,   hydronephrosis or calculi.    Left kidney: 8.6 cm. Increased parenchymal echogenicity. No renal mass,  hydronephrosis or calculi.    Urinary bladder: Decompressed by a Jain catheter.    IMPRESSION:  No hydronephrosis.  Mildly atrophic kidneys demonstrating increased parenchymal echogenicity,   consistent with chronic medical renal disease    < end of copied text >

## 2023-10-25 NOTE — PROGRESS NOTE ADULT - SUBJECTIVE AND OBJECTIVE BOX
Subjective:  No overnight events  Feels better    Medications:  acetaminophen     Tablet .. 650 milliGRAM(s) Oral every 6 hours PRN  atorvastatin 20 milliGRAM(s) Oral at bedtime  buMETAnide Injectable 2 milliGRAM(s) IV Push every 8 hours  clotrimazole 1% Cream 1 Application(s) Topical two times a day  epoetin yuliana-epbx (RETACRIT) Injectable 19398 Unit(s) SubCutaneous every 7 days  folic acid 1 milliGRAM(s) Oral daily  heparin   Injectable 5000 Unit(s) SubCutaneous every 8 hours  influenza  Vaccine (HIGH DOSE) 0.7 milliLiter(s) IntraMuscular once  iron sucrose Injectable 200 milliGRAM(s) IV Push every 24 hours  lactobacillus acidophilus 1 Tablet(s) Oral daily  levothyroxine 50 MICROGram(s) Oral daily  magnesium oxide 400 milliGRAM(s) Oral daily  melatonin 3 milliGRAM(s) Oral at bedtime PRN  metolazone 5 milliGRAM(s) Oral daily  metoprolol succinate ER 75 milliGRAM(s) Oral daily  metroNIDAZOLE    Tablet 500 milliGRAM(s) Oral every 8 hours  ondansetron Injectable 4 milliGRAM(s) IV Push every 8 hours PRN  pantoprazole    Tablet 40 milliGRAM(s) Oral before breakfast  sildenafil (REVATIO) 20 milliGRAM(s) Oral three times a day  Tyvaso (Treprostinil) 16 MICROGram(s) 16 MICROGram(s) Inhalation four times a day    Vitals:  T(C): 36.4 (10-25-23 @ 16:17), Max: 36.6 (10-25-23 @ 04:39)  HR: 88 (10-25-23 @ 16:17) (82 - 93)  BP: 99/54 (10-25-23 @ 16:17) (95/58 - 105/56)  BP(mean): --  RR: 18 (10-25-23 @ 16:17) (18 - 19)  SpO2: 97% (10-25-23 @ 16:17) (95% - 97%)    Daily     Daily Weight in k.8 (24 Oct 2023 23:48)    Weight (kg): 116.8 (10-24 @ 08:45)    I&O's Summary    24 Oct 2023 07:01  -  25 Oct 2023 07:00  --------------------------------------------------------  IN: 742 mL / OUT: 5 mL / NET: -1283 mL    25 Oct 2023 07:01  -  25 Oct 2023 17:59  --------------------------------------------------------  IN: 250 mL / OUT: 825 mL / NET: -575 mL        Physical Exam:  Appearance: No Acute Distress  HEENT: JVP 9cm  Cardiovascular: irreg irregular, 2-3/6 holosyst at LLSB  Respiratory: decreased Bs at the bases  Gastrointestinal: Soft, Non-tender, non-distended	  Skin: no skin lesions  Neurologic: Non-focal  Extremities: +1 b/l Le edema  Psychiatry: A & O x 3, Mood & affect appropriate      Labs:                        8.6    7.64  )-----------( 139      ( 25 Oct 2023 04:53 )             30.4     10-25    147<H>  |  107  |  103.4<H>  ----------------------------<  98  3.4<L>   |  26.0  |  3.29<H>    Ca    7.2<L>      25 Oct 2023 04:53  Mg     1.9     1025

## 2023-10-25 NOTE — PROGRESS NOTE ADULT - ASSESSMENT
SPINE PATIENTS - NEW PROTOCOL PREVISIT    RECORDS RECEIVED FROM: External   Date of Appt: TBD   NOTES (FOR ALL VISITS) STATUS DETAILS   OFFICE NOTE from referring provider Received Dr Shon Harris @ [facility]:  2/16/21   OFFICE NOTE from other specialist N/A    DISCHARGE SUMMARY from hospital N/A    DISCHARGE REPORT from ER N/A    EMG REPORT N/A    MEDICATION LIST Received    IMAGING  (FOR ALL VISITS)     MRI (HEAD, NECK, SPINE) Received [facility]:  MRI Cervical Spine 11/30/20   XRAY (SPINE) *NEUROSURGERY* N/A    CT (HEAD, NECK, SPINE) N/A       Action 2/25/21 MV 7.46am   Action Taken Records and imaging request faxed to [facility]     Action 3/1/21 MV 7.24am   Action Taken Records received from [facility] via fax. Sent to scanning and forwarded to [facility].     Imaging request faxed to [facility] for:  MRI C Spine 11/30/20     3/3/2021-Images received from [facility]-Brought to [facility] for PACS Upload-MR @ 555pm       Initial HF c/s: June De La Torre MD  Outpt PAH expert: Kandis Zee MD    77 y/o M with a history of RV dysfunction in the setting of severe PH (followed by Dr. Kandis Singh at [place] on sildenafil and recently started on Tyvaso), MV repair and JEANMARIE ligation in 2004 at [facility], recent TAVR at [place], s/p PPM, AFib not on AC due to GI bleed, HTN, HLD, CKD (b/l Cr 2.5-3), and neurogenic bladder, who presented after a fall at home.     While in the ED patient was noted to be hypotensive and hypoxic into the Low 80s. Patient was placed on oxygen with noted improvement. Pro-BNP was 15,886 on admission. He has been diuresing on intermittent IV Bumex and was noted to have worsening renal function. HF consulted for further management. He is responding well to uptitration of diuretics.     Cardiac Studies:  10/22/23 TTE: LVIDd 4.62cm, LVEF 50-55%, severe RVE with mod RV dysfunction, normal LA size, severe SONAM, trace MR, mod-severe TR, TAVR with normal gradients, trivial paravalvular leak, est RAP 15 mmHg, est PASP 53.7 mmHg.    R/LHC with Dr Fernandez at [facility] 9/18/23 (report not available): normal cors except for mRCA 30%, no hemodynamic significant response to nitric

## 2023-10-25 NOTE — PROGRESS NOTE ADULT - SUBJECTIVE AND OBJECTIVE BOX
LETICIA MCCLURE  ----------------------------------------  The patient was seen earlier at bedside. Patient with heart failure. Noted some diarrhea with oral intake. Denied chest pain or palpitations.    Vital Signs Last 24 Hrs  T(C): 36.6 (25 Oct 2023 08:47), Max: 36.8 (24 Oct 2023 16:52)  T(F): 97.8 (25 Oct 2023 08:47), Max: 98.3 (24 Oct 2023 16:52)  HR: 89 (25 Oct 2023 08:47) (82 - 96)  BP: 96/54 (25 Oct 2023 08:47) (92/57 - 105/56)  BP(mean): --  RR: 19 (25 Oct 2023 08:47) (18 - 19)  SpO2: 97% (25 Oct 2023 08:47) (95% - 97%)    Parameters below as of 25 Oct 2023 08:47  Patient On (Oxygen Delivery Method): nasal cannula  O2 Flow (L/min): 2    PHYSICAL EXAMINATION:  ----------------------------------------  General appearance: No acute distress, Awake, Alert  HEENT: Normocephalic, Atraumatic, Conjunctiva clear, EOMI  Neck: Supple, No JVD, No tenderness  Lungs: No wheezes, No rales, Diminished breath sounds at the bases  Cardiovascular: S1S2, Regular rhythm  Abdomen: Soft, Nontender, Nondistended, No guarding/rebound, Positive bowel sounds  Extremities: No clubbing, No cyanosis, No calf tenderness, Lower extremity edema  Neuro: Strength equal bilaterally, No tremors  Psychiatric: Appropriate mood, Normal affect    LABORATORY STUDIES:  ----------------------------------------             8.6    7.64  )-----------( 139      ( 25 Oct 2023 04:53 )             30.4     10-25    147<H>  |  107  |  103.4<H>  ----------------------------<  98  3.4<L>   |  26.0  |  3.29<H>    Ca    7.2<L>      25 Oct 2023 04:53  Mg     1.9     10-25    10-24-23 @ 07:01  -  10-25-23 @ 07:00  --------------------------------------------------------  IN: 742 mL / OUT:  mL / NET: -1283 mL    10-25-23 @ 07:01  -  10-25-23 @ 12:24  --------------------------------------------------------  IN: 250 mL / OUT: 400 mL / NET: -150 mL    Urinalysis Basic - ( 25 Oct 2023 09:42 )  Color: Yellow / Appearance: very cloudy / S.010 / pH: x  Gluc: x / Ketone: Negative  / Bili: Negative / Urobili: Negative   Blood: x / Protein: 30 mg/dL / Nitrite: Positive   Leuk Esterase: Moderate / RBC: 3-5 /HPF / WBC 6-10 /HPF   Sq Epi: x / Non Sq Epi: x / Bacteria: Many    MEDICATIONS  (STANDING):  atorvastatin 20 milliGRAM(s) Oral at bedtime  buMETAnide Injectable 2 milliGRAM(s) IV Push every 8 hours  clotrimazole 1% Cream 1 Application(s) Topical two times a day  epoetin yuliana-epbx (RETACRIT) Injectable 21379 Unit(s) SubCutaneous every 7 days  folic acid 1 milliGRAM(s) Oral daily  heparin   Injectable 5000 Unit(s) SubCutaneous every 8 hours  influenza  Vaccine (HIGH DOSE) 0.7 milliLiter(s) IntraMuscular once  iron sucrose Injectable 200 milliGRAM(s) IV Push every 24 hours  lactobacillus acidophilus 1 Tablet(s) Oral daily  levothyroxine 50 MICROGram(s) Oral daily  magnesium oxide 400 milliGRAM(s) Oral daily  metolazone 5 milliGRAM(s) Oral daily  metoprolol succinate ER 75 milliGRAM(s) Oral daily  metroNIDAZOLE    Tablet 500 milliGRAM(s) Oral every 8 hours  pantoprazole    Tablet 40 milliGRAM(s) Oral before breakfast  sildenafil (REVATIO) 20 milliGRAM(s) Oral three times a day  Tyvaso (Treprostinil) 16 MICROGram(s) 16 MICROGram(s) Inhalation four times a day    MEDICATIONS  (PRN):  acetaminophen     Tablet .. 650 milliGRAM(s) Oral every 6 hours PRN Temp greater or equal to 38C (100.4F), Mild Pain (1 - 3)  melatonin 3 milliGRAM(s) Oral at bedtime PRN Insomnia  ondansetron Injectable 4 milliGRAM(s) IV Push every 8 hours PRN Nausea and/or Vomiting      ASSESSMENT / PLAN:  ----------------------------------------  78M with a history of severe pulmonary hypertension, atrial fibrillation, neurogenic bladder, hypothyroidism, hypertension, and gastroesophageal reflux who presented after episodes of falling at home and was found to have hypoxia requiring initiation of supplemental oxygen. Bumetanide was initiated for diuresis with improvement in the the hypoxia. Renal function remained stable.    Acute hypoxic respiratory failure  - Pulmonary hypertension and heart failure  - Continue on supplemental oxygen as need with titration as tolerated    Acute on chronic diastolic heart failure  - Elevated troponin and BNP levels  - Monitoring urine output and daily weights  - Echocardiogram noted ejection fraction of 50 to 55%, moderately reduced right ventricular systolic function and moderate pulmonary hypertension  - On bumetanide and metolazone    Pulmonary hypertension  - On sildenafil and treprostinil    Neurogenic bladder  - Previously self-catheterizing but recently required Jain catheter  - For further follow up with Urology on an outpatient basis    Chronic kidney disease with acute kidney injury  - Monitoring renal function on diuretic therapy    Anemia  - On epoetin yuliana and iron sucrose    Hypothyroidism  - On levothyroxine    Atrial fibrillation  - On metoprolol  - Not on anticoagulation at home    Fall  - Mechanical in nature  - No loss of consciousness  - Xray of the knee and hand were without fractures  - Physical Therapy evaluation noted and thought to benefit from further treatment at a rehabilitation facility    Bacteremia  - Blood culture (10/21) grew Staph epidermidis in one bottle  - Suspect contaminant  - Remains afebrile and without leukocytosis    Overall prognosis guarded

## 2023-10-26 LAB
ANION GAP SERPL CALC-SCNC: 14 MMOL/L — SIGNIFICANT CHANGE UP (ref 5–17)
ANION GAP SERPL CALC-SCNC: 14 MMOL/L — SIGNIFICANT CHANGE UP (ref 5–17)
BUN SERPL-MCNC: 101.3 MG/DL — HIGH (ref 8–20)
BUN SERPL-MCNC: 101.3 MG/DL — HIGH (ref 8–20)
CALCIUM SERPL-MCNC: 7.6 MG/DL — LOW (ref 8.4–10.5)
CALCIUM SERPL-MCNC: 7.6 MG/DL — LOW (ref 8.4–10.5)
CHLORIDE SERPL-SCNC: 108 MMOL/L — SIGNIFICANT CHANGE UP (ref 96–108)
CHLORIDE SERPL-SCNC: 108 MMOL/L — SIGNIFICANT CHANGE UP (ref 96–108)
CO2 SERPL-SCNC: 27 MMOL/L — SIGNIFICANT CHANGE UP (ref 22–29)
CO2 SERPL-SCNC: 27 MMOL/L — SIGNIFICANT CHANGE UP (ref 22–29)
CREAT SERPL-MCNC: 3.03 MG/DL — HIGH (ref 0.5–1.3)
CREAT SERPL-MCNC: 3.03 MG/DL — HIGH (ref 0.5–1.3)
CULTURE RESULTS: SIGNIFICANT CHANGE UP
CULTURE RESULTS: SIGNIFICANT CHANGE UP
EGFR: 20 ML/MIN/1.73M2 — LOW
EGFR: 20 ML/MIN/1.73M2 — LOW
GLUCOSE SERPL-MCNC: 100 MG/DL — HIGH (ref 70–99)
GLUCOSE SERPL-MCNC: 100 MG/DL — HIGH (ref 70–99)
HCT VFR BLD CALC: 31.1 % — LOW (ref 39–50)
HCT VFR BLD CALC: 31.1 % — LOW (ref 39–50)
HGB BLD-MCNC: 9.1 G/DL — LOW (ref 13–17)
HGB BLD-MCNC: 9.1 G/DL — LOW (ref 13–17)
MAGNESIUM SERPL-MCNC: 1.8 MG/DL — SIGNIFICANT CHANGE UP (ref 1.6–2.6)
MAGNESIUM SERPL-MCNC: 1.8 MG/DL — SIGNIFICANT CHANGE UP (ref 1.6–2.6)
MCHC RBC-ENTMCNC: 25.3 PG — LOW (ref 27–34)
MCHC RBC-ENTMCNC: 25.3 PG — LOW (ref 27–34)
MCHC RBC-ENTMCNC: 29.3 GM/DL — LOW (ref 32–36)
MCHC RBC-ENTMCNC: 29.3 GM/DL — LOW (ref 32–36)
MCV RBC AUTO: 86.6 FL — SIGNIFICANT CHANGE UP (ref 80–100)
MCV RBC AUTO: 86.6 FL — SIGNIFICANT CHANGE UP (ref 80–100)
PLATELET # BLD AUTO: 144 K/UL — LOW (ref 150–400)
PLATELET # BLD AUTO: 144 K/UL — LOW (ref 150–400)
POTASSIUM SERPL-MCNC: 3.4 MMOL/L — LOW (ref 3.5–5.3)
POTASSIUM SERPL-MCNC: 3.4 MMOL/L — LOW (ref 3.5–5.3)
POTASSIUM SERPL-SCNC: 3.4 MMOL/L — LOW (ref 3.5–5.3)
POTASSIUM SERPL-SCNC: 3.4 MMOL/L — LOW (ref 3.5–5.3)
RBC # BLD: 3.59 M/UL — LOW (ref 4.2–5.8)
RBC # BLD: 3.59 M/UL — LOW (ref 4.2–5.8)
RBC # FLD: 18.3 % — HIGH (ref 10.3–14.5)
RBC # FLD: 18.3 % — HIGH (ref 10.3–14.5)
SODIUM SERPL-SCNC: 149 MMOL/L — HIGH (ref 135–145)
SODIUM SERPL-SCNC: 149 MMOL/L — HIGH (ref 135–145)
SPECIMEN SOURCE: SIGNIFICANT CHANGE UP
SPECIMEN SOURCE: SIGNIFICANT CHANGE UP
WBC # BLD: 7.82 K/UL — SIGNIFICANT CHANGE UP (ref 3.8–10.5)
WBC # BLD: 7.82 K/UL — SIGNIFICANT CHANGE UP (ref 3.8–10.5)
WBC # FLD AUTO: 7.82 K/UL — SIGNIFICANT CHANGE UP (ref 3.8–10.5)
WBC # FLD AUTO: 7.82 K/UL — SIGNIFICANT CHANGE UP (ref 3.8–10.5)

## 2023-10-26 PROCEDURE — 99233 SBSQ HOSP IP/OBS HIGH 50: CPT

## 2023-10-26 PROCEDURE — 93010 ELECTROCARDIOGRAM REPORT: CPT

## 2023-10-26 RX ADMIN — Medication 20 MILLIGRAM(S): at 22:51

## 2023-10-26 RX ADMIN — Medication 1 MILLIGRAM(S): at 11:06

## 2023-10-26 RX ADMIN — HEPARIN SODIUM 5000 UNIT(S): 5000 INJECTION INTRAVENOUS; SUBCUTANEOUS at 16:05

## 2023-10-26 RX ADMIN — Medication 500 MILLIGRAM(S): at 22:51

## 2023-10-26 RX ADMIN — IRON SUCROSE 200 MILLIGRAM(S): 20 INJECTION, SOLUTION INTRAVENOUS at 19:08

## 2023-10-26 RX ADMIN — Medication 75 MILLIGRAM(S): at 09:30

## 2023-10-26 RX ADMIN — HEPARIN SODIUM 5000 UNIT(S): 5000 INJECTION INTRAVENOUS; SUBCUTANEOUS at 06:04

## 2023-10-26 RX ADMIN — BUMETANIDE 2 MILLIGRAM(S): 0.25 INJECTION INTRAMUSCULAR; INTRAVENOUS at 16:05

## 2023-10-26 RX ADMIN — Medication 500 MILLIGRAM(S): at 16:04

## 2023-10-26 RX ADMIN — Medication 20 MILLIGRAM(S): at 16:04

## 2023-10-26 RX ADMIN — Medication 1 TABLET(S): at 11:06

## 2023-10-26 RX ADMIN — BUMETANIDE 2 MILLIGRAM(S): 0.25 INJECTION INTRAMUSCULAR; INTRAVENOUS at 23:04

## 2023-10-26 RX ADMIN — Medication 20 MILLIGRAM(S): at 05:42

## 2023-10-26 RX ADMIN — PANTOPRAZOLE SODIUM 40 MILLIGRAM(S): 20 TABLET, DELAYED RELEASE ORAL at 05:41

## 2023-10-26 RX ADMIN — Medication 50 MICROGRAM(S): at 06:04

## 2023-10-26 RX ADMIN — MAGNESIUM OXIDE 400 MG ORAL TABLET 400 MILLIGRAM(S): 241.3 TABLET ORAL at 11:06

## 2023-10-26 RX ADMIN — HEPARIN SODIUM 5000 UNIT(S): 5000 INJECTION INTRAVENOUS; SUBCUTANEOUS at 22:50

## 2023-10-26 RX ADMIN — BUMETANIDE 2 MILLIGRAM(S): 0.25 INJECTION INTRAMUSCULAR; INTRAVENOUS at 05:58

## 2023-10-26 RX ADMIN — Medication 500 MILLIGRAM(S): at 06:15

## 2023-10-26 RX ADMIN — ATORVASTATIN CALCIUM 20 MILLIGRAM(S): 80 TABLET, FILM COATED ORAL at 22:51

## 2023-10-26 RX ADMIN — Medication 1 APPLICATION(S): at 06:05

## 2023-10-26 RX ADMIN — Medication 1 APPLICATION(S): at 19:09

## 2023-10-26 NOTE — PROGRESS NOTE ADULT - SUBJECTIVE AND OBJECTIVE BOX
[facility]                                   [address] [place] [address]                                                      PHONE: [phone]                                                         FAX: [phone]  [email or web address]  ---------------------------------------------------------------------------------------------------------------------------------    Overnight events/patient complaints:  Patient feeling well today.  no chest pain or dyspnea.     PAST MEDICAL & SURGICAL HISTORY:  HTN (hypertension)      HLD (hyperlipidemia)      Bladder-neck obstruction      Pulmonary hypertension      Chronic renal insufficiency      History of cirrhosis of liver      Atrial fibrillation      Hypothyroidism      MARIMAR (obstructive sleep apnea)      S/P TAVR (transcatheter aortic valve replacement)      History of cholecystectomy      H/O hernia repair      History of mitral valve repair      Pacemaker          No Known Drug Allergies  adhesives (Other)    MEDICATIONS  (STANDING):  atorvastatin 20 milliGRAM(s) Oral at bedtime  buMETAnide Injectable 2 milliGRAM(s) IV Push every 8 hours  clotrimazole 1% Cream 1 Application(s) Topical two times a day  epoetin yuliana-epbx (RETACRIT) Injectable 76067 Unit(s) SubCutaneous every 7 days  folic acid 1 milliGRAM(s) Oral daily  heparin   Injectable 5000 Unit(s) SubCutaneous every 8 hours  influenza  Vaccine (HIGH DOSE) 0.7 milliLiter(s) IntraMuscular once  iron sucrose Injectable 200 milliGRAM(s) IV Push every 24 hours  lactobacillus acidophilus 1 Tablet(s) Oral daily  levothyroxine 50 MICROGram(s) Oral daily  magnesium oxide 400 milliGRAM(s) Oral daily  metolazone 5 milliGRAM(s) Oral daily  metoprolol succinate ER 75 milliGRAM(s) Oral daily  metroNIDAZOLE    Tablet 500 milliGRAM(s) Oral every 8 hours  pantoprazole    Tablet 40 milliGRAM(s) Oral before breakfast  sildenafil (REVATIO) 20 milliGRAM(s) Oral three times a day  Tyvaso (Treprostinil) 16 MICROGram(s) 16 MICROGram(s) Inhalation four times a day    MEDICATIONS  (PRN):  acetaminophen     Tablet .. 650 milliGRAM(s) Oral every 6 hours PRN Temp greater or equal to 38C (100.4F), Mild Pain (1 - 3)  melatonin 3 milliGRAM(s) Oral at bedtime PRN Insomnia  ondansetron Injectable 4 milliGRAM(s) IV Push every 8 hours PRN Nausea and/or Vomiting      Vital Signs Last 24 Hrs  T(C): 36.6 (26 Oct 2023 08:06), Max: 36.6 (26 Oct 2023 04:05)  T(F): 97.8 (26 Oct 2023 08:06), Max: 97.8 (26 Oct 2023 04:05)  HR: 84 (26 Oct 2023 08:06) (79 - 88)  BP: 102/58 (26 Oct 2023 08:06) (90/49 - 102/58)  BP(mean): --  RR: 18 (26 Oct 2023 08:06) (17 - 18)  SpO2: 89% (26 Oct 2023 08:06) (89% - 97%)    Parameters below as of 26 Oct 2023 08:06  Patient On (Oxygen Delivery Method): room air      ICU Vital Signs Last 24 Hrs  LETICIA MCCLURE  I&O's Detail    25 Oct 2023 07:01  -  26 Oct 2023 07:00  --------------------------------------------------------  IN:    Oral Fluid: 250 mL  Total IN: 250 mL    OUT:    Indwelling Catheter - Urethral (mL): 1525 mL  Total OUT: 1525 mL    Total NET: -1275 mL      26 Oct 2023 07:01  -  26 Oct 2023 10:40  --------------------------------------------------------  IN:    Oral Fluid: 250 mL  Total IN: 250 mL    OUT:  Total OUT: 0 mL    Total NET: 250 mL        I&O's Summary    25 Oct 2023 07:01  -  26 Oct 2023 07:00  --------------------------------------------------------  IN: 250 mL / OUT: 1525 mL / NET: -1275 mL    26 Oct 2023 07:01  -  26 Oct 2023 10:40  --------------------------------------------------------  IN: 250 mL / OUT: 0 mL / NET: 250 mL      Drug Dosing Weight  LETICIA MCCLURE      PHYSICAL EXAM:  PHYSICAL EXAM:  General: Appears well developed, alert and cooperative.  HEENT: Head; normocephalic, atraumatic.  Eyes: Pupils reactive, cornea wnl.  Neck: Supple, no nodes adenopathy, no JVD, no carotid bruit  CARDIOVASCULAR: Normal S1 and S2, 2/6 holosystolic murmur at LLSB  LUNGS: No rales, rhonchi or wheeze. Normal breath sounds bilaterally.  ABDOMEN: Soft, nontender, nondistended  EXTREMITIES: Minimal LE edema; +saddle edema  SKIN: warm and dry with normal turgor.  NEURO: Alert/oriented x 3/normal motor exam.   PSYCH: normal affect.        LABS:                        9.1    7.82  )-----------( 144      ( 26 Oct 2023 04:40 )             31.1     10-26    149<H>  |  108  |  101.3<H>  ----------------------------<  100<H>  3.4<L>   |  27.0  |  3.03<H>    Ca    7.6<L>      26 Oct 2023 04:40  Mg     1.8     10-26      LETICIA MCCLURE        Urinalysis Basic - ( 26 Oct 2023 04:40 )    Color: x / Appearance: x / SG: x / pH: x  Gluc: 100 mg/dL / Ketone: x  / Bili: x / Urobili: x   Blood: x / Protein: x / Nitrite: x   Leuk Esterase: x / RBC: x / WBC x   Sq Epi: x / Non Sq Epi: x / Bacteria: x        RADIOLOGY & ADDITIONAL STUDIES:    INTERPRETATION OF TELEMETRY (personally reviewed):    ECG:    ECHO:Summary:   1. Endocardial visualization was enhanced with intravenous echo contrast.   2. Left ventricular ejection fraction, by visual estimation, is 50 to   55%.   3. Normal global left ventricular systolic function.   4. Severely enlarged right atrium.   5. The mitral in-flow pattern reveals no discernable A-wave, therefore   no comment on diastolic function canbe made.   6. There is mild concentric left ventricular hypertrophy.   7. Severely enlarged right ventricle.   8. Moderately reduced RV systolic function.   9. Trace mitral valve regurgitation.  10. Moderate-severe tricuspid regurgitation.  11. TAVR in the aortic position.  12. Trivial paravalvular leak.  13. Peak aortic valve gradient is 18.5 mmHg and the mean gradient is 7.1   mmHg, which is probably normal in the setting of a prosthetic aortic   valve.      STRESS TEST:    CARDIAC CATHETERIZATION:    ASSESSMENT AND PLAN:  In summary, LETICIA MCCLURE is an 78y Male with past medical history significant for HTN, HLD, neurogenic bladder, afib not on AC due to GI bleed, MV repair, JEANMARIE ligation, severe pulmonary HTN, TAVR, [place] PPM, LVEF 50-55%, CKD p/w mechanical fall found to have worsening right heart failure.    Mechanical fall/right heart failure    -continue IV bumex  -metolazone 5mg daily given 30 minutes before morning bumex infusion  -cr improving    HLD- continue atorvastatin    pulmonary htn- continue sildenafil and tyvaso    Afib- not on anticoagulation due to Gi bleed      Thank you for allowing [facility] to participate in the care of this patient.  Please feel free to call with any questions or concerns.

## 2023-10-26 NOTE — PROGRESS NOTE ADULT - SUBJECTIVE AND OBJECTIVE BOX
LETICIA MCCLURE  ----------------------------------------  The patient was seen earlier at bedside. Patient with heart failure. Noted some dyspnea. Ate some breakfast.    Vital Signs Last 24 Hrs  T(C): 36.6 (26 Oct 2023 08:06), Max: 36.6 (26 Oct 2023 04:05)  T(F): 97.8 (26 Oct 2023 08:06), Max: 97.8 (26 Oct 2023 04:05)  HR: 84 (26 Oct 2023 08:06) (79 - 88)  BP: 102/58 (26 Oct 2023 08:06) (90/49 - 102/58)  BP(mean): --  RR: 18 (26 Oct 2023 08:06) (17 - 18)  SpO2: 89% (26 Oct 2023 08:06) (89% - 97%)    Parameters below as of 26 Oct 2023 08:06  Patient On (Oxygen Delivery Method): room air    PHYSICAL EXAMINATION:  ----------------------------------------  General appearance: No acute distress, Awake, Alert  HEENT: Normocephalic, Atraumatic, Conjunctiva clear, EOMI  Neck: Supple, No JVD, No tenderness  Lungs: No wheezes, No rales, Diminished breath sounds at the bases  Cardiovascular: S1S2, Regular rhythm  Abdomen: Soft, Nontender, Nondistended, No guarding/rebound, Positive bowel sounds  Extremities: No clubbing, No cyanosis, No calf tenderness, Lower extremity edema  Neuro: Strength equal bilaterally, No tremors  Psychiatric: Appropriate mood, Normal affect    LABORATORY STUDIES:  ----------------------------------------             9.1    7.82  )-----------( 144      ( 26 Oct 2023 04:40 )             31.1     10-26    149<H>  |  108  |  101.3<H>  ----------------------------<  100<H>  3.4<L>   |  27.0  |  3.03<H>    Ca    7.6<L>      26 Oct 2023 04:40  Mg     1.8     10-26    Urinalysis Basic - ( 26 Oct 2023 04:40 )  Color: x / Appearance: x / SG: x / pH: x  Gluc: 100 mg/dL / Ketone: x  / Bili: x / Urobili: x   Blood: x / Protein: x / Nitrite: x   Leuk Esterase: x / RBC: x / WBC x   Sq Epi: x / Non Sq Epi: x / Bacteria: x    MEDICATIONS  (STANDING):  atorvastatin 20 milliGRAM(s) Oral at bedtime  buMETAnide Injectable 2 milliGRAM(s) IV Push every 8 hours  clotrimazole 1% Cream 1 Application(s) Topical two times a day  epoetin yuliana-epbx (RETACRIT) Injectable 50295 Unit(s) SubCutaneous every 7 days  folic acid 1 milliGRAM(s) Oral daily  heparin   Injectable 5000 Unit(s) SubCutaneous every 8 hours  influenza  Vaccine (HIGH DOSE) 0.7 milliLiter(s) IntraMuscular once  iron sucrose Injectable 200 milliGRAM(s) IV Push every 24 hours  lactobacillus acidophilus 1 Tablet(s) Oral daily  levothyroxine 50 MICROGram(s) Oral daily  magnesium oxide 400 milliGRAM(s) Oral daily  metolazone 5 milliGRAM(s) Oral daily  metoprolol succinate ER 75 milliGRAM(s) Oral daily  metroNIDAZOLE    Tablet 500 milliGRAM(s) Oral every 8 hours  pantoprazole    Tablet 40 milliGRAM(s) Oral before breakfast  sildenafil (REVATIO) 20 milliGRAM(s) Oral three times a day  Tyvaso (Treprostinil) 16 MICROGram(s) 16 MICROGram(s) Inhalation four times a day    MEDICATIONS  (PRN):  acetaminophen     Tablet .. 650 milliGRAM(s) Oral every 6 hours PRN Temp greater or equal to 38C (100.4F), Mild Pain (1 - 3)  melatonin 3 milliGRAM(s) Oral at bedtime PRN Insomnia  ondansetron Injectable 4 milliGRAM(s) IV Push every 8 hours PRN Nausea and/or Vomiting      ASSESSMENT / PLAN:  ----------------------------------------  78M with a history of severe pulmonary hypertension, atrial fibrillation, neurogenic bladder, hypothyroidism, hypertension, and gastroesophageal reflux who presented after episodes of falling at home and was found to have hypoxia requiring initiation of supplemental oxygen. Bumetanide was initiated for diuresis with improvement in the the hypoxia. Renal function remained stable.    Acute hypoxic respiratory failure  - Pulmonary hypertension and heart failure  - Continue on supplemental oxygen as need with titration as tolerated  - Oxygen saturation of 86% on ambient air at rest    Acute on chronic diastolic heart failure  - Elevated troponin and BNP levels  - Monitoring urine output and daily weights  - Echocardiogram noted ejection fraction of 50 to 55%, moderately reduced right ventricular systolic function and moderate pulmonary hypertension  - On bumetanide and metolazone    Pulmonary hypertension  - Continue on sildenafil and treprostinil    Neurogenic bladder  - Previously self-catheterizing but recently required Jain catheter  - For further follow up with Urology on an outpatient basis    Chronic kidney disease with acute kidney injury  - Monitoring renal function on diuretic therapy    Anemia  - On epoetin yuliana and iron sucrose    Hypothyroidism  - On levothyroxine    Atrial fibrillation  - On metoprolol  - Not on anticoagulation at home    Fall  - Mechanical in nature  - No loss of consciousness  - Xray of the knee and hand were without fractures  - Physical Therapy evaluation noted and thought to benefit from further treatment at a rehabilitation facility    Bacteremia  - Blood culture (10/21) grew Staph epidermidis in one bottle  - Suspect contaminant  - Remains afebrile and without leukocytosis    Diarrhea  - Appears improved  - To complete the course of metronidazole which was initiated as outpatient    Overall prognosis guarded

## 2023-10-26 NOTE — PROGRESS NOTE ADULT - ASSESSMENT
CKD IV: Baseline creat 2.5 - 3  BIV CM  (R > L)==> fluid overload improved   Neurogenic bladder with chronic eubanks  Renal sono 1/23 - Atrophic echogenic kidneys , w/o hydro  - avoid potential nephrotoxins  - cont diuretics --> expect azotemia  - follow labs  - likely will require RRT in future    Anemia :  - cont TAD and IV Fe  - target Hgb > 10.0    I spoke with son earlier via telephone

## 2023-10-26 NOTE — PROGRESS NOTE ADULT - SUBJECTIVE AND OBJECTIVE BOX
NEPHROLOGY INTERVAL HPI/OVERNIGHT EVENTS:  pt clinically stable overnight  no acute distress noted  less loose BMs    MEDICATIONS  (STANDING):  atorvastatin 20 milliGRAM(s) Oral at bedtime  buMETAnide Injectable 2 milliGRAM(s) IV Push every 8 hours  clotrimazole 1% Cream 1 Application(s) Topical two times a day  epoetin yuliana-epbx (RETACRIT) Injectable 62266 Unit(s) SubCutaneous every 7 days  folic acid 1 milliGRAM(s) Oral daily  heparin   Injectable 5000 Unit(s) SubCutaneous every 8 hours  influenza  Vaccine (HIGH DOSE) 0.7 milliLiter(s) IntraMuscular once  iron sucrose Injectable 200 milliGRAM(s) IV Push every 24 hours  lactobacillus acidophilus 1 Tablet(s) Oral daily  levothyroxine 50 MICROGram(s) Oral daily  magnesium oxide 400 milliGRAM(s) Oral daily  metolazone 5 milliGRAM(s) Oral daily  metoprolol succinate ER 75 milliGRAM(s) Oral daily  metroNIDAZOLE    Tablet 500 milliGRAM(s) Oral every 8 hours  pantoprazole    Tablet 40 milliGRAM(s) Oral before breakfast  sildenafil (REVATIO) 20 milliGRAM(s) Oral three times a day  Tyvaso (Treprostinil) 16 MICROGram(s) 16 MICROGram(s) Inhalation four times a day    MEDICATIONS  (PRN):  acetaminophen     Tablet .. 650 milliGRAM(s) Oral every 6 hours PRN Temp greater or equal to 38C (100.4F), Mild Pain (1 - 3)  melatonin 3 milliGRAM(s) Oral at bedtime PRN Insomnia  ondansetron Injectable 4 milliGRAM(s) IV Push every 8 hours PRN Nausea and/or Vomiting      Allergies    No Known Drug Allergies  adhesives (Other)        Vital Signs Last 24 Hrs  T(C): 36.6 (26 Oct 2023 08:06), Max: 36.6 (26 Oct 2023 04:05)  T(F): 97.8 (26 Oct 2023 08:06), Max: 97.8 (26 Oct 2023 04:05)  HR: 84 (26 Oct 2023 08:06) (79 - 88)  BP: 102/58 (26 Oct 2023 08:06) (90/49 - 102/58)  BP(mean): --  RR: 18 (26 Oct 2023 08:06) (17 - 18)  SpO2: 89% (26 Oct 2023 08:06) (89% - 97%)    Parameters below as of 26 Oct 2023 08:06  Patient On (Oxygen Delivery Method): room air        PHYSICAL EXAM:  Debilitated  NECK: Supple, No JVD  NERVOUS SYSTEM:  Alert & Oriented X3, no asterixis  CHEST/LUNG: Clear bilaterally, diminished BS  HEART: Regular rate and rhythm; No rub  ABDOMEN: Soft, Nontender, +BS  EXTREMITIES:  2+ Peripheral Pulses, + tr dependent edema  SKIN: No rashes or lesions  : Jain      LABS:                        9.1    7.82  )-----------( 144      ( 26 Oct 2023 04:40 )             31.1     10-26    149<H>  |  108  |  101.3<H>  ----------------------------<  100<H>  3.4<L>   |  27.0  |  3.03<H>    Ca    7.6<L>      26 Oct 2023 04:40  Mg     1.8     10-26        Urinalysis Basic - ( 26 Oct 2023 04:40 )    Color: x / Appearance: x / SG: x / pH: x  Gluc: 100 mg/dL / Ketone: x  / Bili: x / Urobili: x   Blood: x / Protein: x / Nitrite: x   Leuk Esterase: x / RBC: x / WBC x   Sq Epi: x / Non Sq Epi: x / Bacteria: x      Magnesium: 1.8 mg/dL (10-26 @ 04:40)      RADIOLOGY & ADDITIONAL TESTS:

## 2023-10-27 LAB
ANION GAP SERPL CALC-SCNC: 11 MMOL/L — SIGNIFICANT CHANGE UP (ref 5–17)
ANION GAP SERPL CALC-SCNC: 11 MMOL/L — SIGNIFICANT CHANGE UP (ref 5–17)
BUN SERPL-MCNC: 101.6 MG/DL — HIGH (ref 8–20)
BUN SERPL-MCNC: 101.6 MG/DL — HIGH (ref 8–20)
CALCIUM SERPL-MCNC: 7.6 MG/DL — LOW (ref 8.4–10.5)
CALCIUM SERPL-MCNC: 7.6 MG/DL — LOW (ref 8.4–10.5)
CHLORIDE SERPL-SCNC: 108 MMOL/L — SIGNIFICANT CHANGE UP (ref 96–108)
CHLORIDE SERPL-SCNC: 108 MMOL/L — SIGNIFICANT CHANGE UP (ref 96–108)
CO2 SERPL-SCNC: 29 MMOL/L — SIGNIFICANT CHANGE UP (ref 22–29)
CO2 SERPL-SCNC: 29 MMOL/L — SIGNIFICANT CHANGE UP (ref 22–29)
CREAT SERPL-MCNC: 3.1 MG/DL — HIGH (ref 0.5–1.3)
CREAT SERPL-MCNC: 3.1 MG/DL — HIGH (ref 0.5–1.3)
EGFR: 20 ML/MIN/1.73M2 — LOW
EGFR: 20 ML/MIN/1.73M2 — LOW
GLUCOSE SERPL-MCNC: 99 MG/DL — SIGNIFICANT CHANGE UP (ref 70–99)
GLUCOSE SERPL-MCNC: 99 MG/DL — SIGNIFICANT CHANGE UP (ref 70–99)
HCT VFR BLD CALC: 32.7 % — LOW (ref 39–50)
HCT VFR BLD CALC: 32.7 % — LOW (ref 39–50)
HGB BLD-MCNC: 9.3 G/DL — LOW (ref 13–17)
HGB BLD-MCNC: 9.3 G/DL — LOW (ref 13–17)
MAGNESIUM SERPL-MCNC: 1.9 MG/DL — SIGNIFICANT CHANGE UP (ref 1.6–2.6)
MAGNESIUM SERPL-MCNC: 1.9 MG/DL — SIGNIFICANT CHANGE UP (ref 1.6–2.6)
MCHC RBC-ENTMCNC: 24.7 PG — LOW (ref 27–34)
MCHC RBC-ENTMCNC: 24.7 PG — LOW (ref 27–34)
MCHC RBC-ENTMCNC: 28.4 GM/DL — LOW (ref 32–36)
MCHC RBC-ENTMCNC: 28.4 GM/DL — LOW (ref 32–36)
MCV RBC AUTO: 87 FL — SIGNIFICANT CHANGE UP (ref 80–100)
MCV RBC AUTO: 87 FL — SIGNIFICANT CHANGE UP (ref 80–100)
PLATELET # BLD AUTO: 152 K/UL — SIGNIFICANT CHANGE UP (ref 150–400)
PLATELET # BLD AUTO: 152 K/UL — SIGNIFICANT CHANGE UP (ref 150–400)
POTASSIUM SERPL-MCNC: 3.2 MMOL/L — LOW (ref 3.5–5.3)
POTASSIUM SERPL-MCNC: 3.2 MMOL/L — LOW (ref 3.5–5.3)
POTASSIUM SERPL-SCNC: 3.2 MMOL/L — LOW (ref 3.5–5.3)
POTASSIUM SERPL-SCNC: 3.2 MMOL/L — LOW (ref 3.5–5.3)
RBC # BLD: 3.76 M/UL — LOW (ref 4.2–5.8)
RBC # BLD: 3.76 M/UL — LOW (ref 4.2–5.8)
RBC # FLD: 18.6 % — HIGH (ref 10.3–14.5)
RBC # FLD: 18.6 % — HIGH (ref 10.3–14.5)
SODIUM SERPL-SCNC: 148 MMOL/L — HIGH (ref 135–145)
SODIUM SERPL-SCNC: 148 MMOL/L — HIGH (ref 135–145)
WBC # BLD: 8.72 K/UL — SIGNIFICANT CHANGE UP (ref 3.8–10.5)
WBC # BLD: 8.72 K/UL — SIGNIFICANT CHANGE UP (ref 3.8–10.5)
WBC # FLD AUTO: 8.72 K/UL — SIGNIFICANT CHANGE UP (ref 3.8–10.5)
WBC # FLD AUTO: 8.72 K/UL — SIGNIFICANT CHANGE UP (ref 3.8–10.5)

## 2023-10-27 PROCEDURE — 99233 SBSQ HOSP IP/OBS HIGH 50: CPT

## 2023-10-27 PROCEDURE — 99233 SBSQ HOSP IP/OBS HIGH 50: CPT | Mod: FS

## 2023-10-27 RX ORDER — BUMETANIDE 0.25 MG/ML
3 INJECTION INTRAMUSCULAR; INTRAVENOUS
Refills: 0 | Status: DISCONTINUED | OUTPATIENT
Start: 2023-10-28 | End: 2023-10-30

## 2023-10-27 RX ORDER — LOPERAMIDE HCL 2 MG
2 TABLET ORAL
Refills: 0 | Status: DISCONTINUED | OUTPATIENT
Start: 2023-10-27 | End: 2023-10-30

## 2023-10-27 RX ORDER — POTASSIUM CHLORIDE 20 MEQ
40 PACKET (EA) ORAL ONCE
Refills: 0 | Status: DISCONTINUED | OUTPATIENT
Start: 2023-10-27 | End: 2023-10-28

## 2023-10-27 RX ORDER — POTASSIUM CHLORIDE 20 MEQ
40 PACKET (EA) ORAL DAILY
Refills: 0 | Status: DISCONTINUED | OUTPATIENT
Start: 2023-10-28 | End: 2023-10-28

## 2023-10-27 RX ADMIN — Medication 20 MILLIGRAM(S): at 16:14

## 2023-10-27 RX ADMIN — Medication 1 TABLET(S): at 12:51

## 2023-10-27 RX ADMIN — HEPARIN SODIUM 5000 UNIT(S): 5000 INJECTION INTRAVENOUS; SUBCUTANEOUS at 16:14

## 2023-10-27 RX ADMIN — Medication 500 MILLIGRAM(S): at 05:58

## 2023-10-27 RX ADMIN — HEPARIN SODIUM 5000 UNIT(S): 5000 INJECTION INTRAVENOUS; SUBCUTANEOUS at 23:58

## 2023-10-27 RX ADMIN — PANTOPRAZOLE SODIUM 40 MILLIGRAM(S): 20 TABLET, DELAYED RELEASE ORAL at 06:12

## 2023-10-27 RX ADMIN — Medication 50 MICROGRAM(S): at 05:52

## 2023-10-27 RX ADMIN — Medication 1 MILLIGRAM(S): at 12:52

## 2023-10-27 RX ADMIN — MAGNESIUM OXIDE 400 MG ORAL TABLET 400 MILLIGRAM(S): 241.3 TABLET ORAL at 12:52

## 2023-10-27 RX ADMIN — Medication 75 MILLIGRAM(S): at 05:54

## 2023-10-27 RX ADMIN — ATORVASTATIN CALCIUM 20 MILLIGRAM(S): 80 TABLET, FILM COATED ORAL at 23:58

## 2023-10-27 RX ADMIN — Medication 20 MILLIGRAM(S): at 05:52

## 2023-10-27 RX ADMIN — Medication 20 MILLIGRAM(S): at 23:57

## 2023-10-27 RX ADMIN — Medication 1 APPLICATION(S): at 06:02

## 2023-10-27 RX ADMIN — Medication 500 MILLIGRAM(S): at 16:13

## 2023-10-27 RX ADMIN — HEPARIN SODIUM 5000 UNIT(S): 5000 INJECTION INTRAVENOUS; SUBCUTANEOUS at 05:57

## 2023-10-27 NOTE — PROGRESS NOTE ADULT - SUBJECTIVE AND OBJECTIVE BOX
ADVANCED HEART FAILURE - PROGRESS NOTE  [address] [place] [address]  Office Phone: [phone]/Fax: [phone]  Service/On Call Phone [phone]  _______________________________________________________________________________________________________    Subjective:  - NAEO  - Resting comfortably in bed with no complaints    Medications:  acetaminophen     Tablet .. 650 milliGRAM(s) Oral every 6 hours PRN  atorvastatin 20 milliGRAM(s) Oral at bedtime  buMETAnide Injectable 2 milliGRAM(s) IV Push every 8 hours  clotrimazole 1% Cream 1 Application(s) Topical two times a day  epoetin yuliana-epbx (RETACRIT) Injectable 62088 Unit(s) SubCutaneous every 7 days  folic acid 1 milliGRAM(s) Oral daily  heparin   Injectable 5000 Unit(s) SubCutaneous every 8 hours  influenza  Vaccine (HIGH DOSE) 0.7 milliLiter(s) IntraMuscular once  lactobacillus acidophilus 1 Tablet(s) Oral daily  levothyroxine 50 MICROGram(s) Oral daily  loperamide 2 milliGRAM(s) Oral four times a day PRN  magnesium oxide 400 milliGRAM(s) Oral daily  melatonin 3 milliGRAM(s) Oral at bedtime PRN  metolazone 5 milliGRAM(s) Oral daily  metoprolol succinate ER 75 milliGRAM(s) Oral daily  metroNIDAZOLE    Tablet 500 milliGRAM(s) Oral every 8 hours  ondansetron Injectable 4 milliGRAM(s) IV Push every 8 hours PRN  pantoprazole    Tablet 40 milliGRAM(s) Oral before breakfast  potassium chloride    Tablet ER 40 milliEquivalent(s) Oral once  sildenafil (REVATIO) 20 milliGRAM(s) Oral three times a day  Tyvaso (Treprostinil) 16 MICROGram(s) 16 MICROGram(s) Inhalation four times a day      ICU Vital Signs Last 24 Hrs  T(C): 36.6, Max: 36.9 (10-26 @ 16:28)  HR: 104 (71 - 104)  BP: 91/53 (91/53 - 120/63)  BP(mean): --  ABP: --  ABP(mean): --  RR: 18 (18 - 18)  SpO2: 95% (93% - 95%)    Weight in k.8 (10-24-23)  Weight in k.1 (10-23-23)      I&O's Summary Last 24 Hrs    IN: 250 mL / OUT: 1200 mL / NET: -950 mL      Tele: Afib 70-90s. frequent PVCs, triplets, NSVT (longest run 9 bts)    Physical Exam:    General: No distress. Comfortable.  Neck: JVP not elevated.  Respiratory: Clear to auscultation bilaterally  Cardiovascular: irreg irregular, 2-3/6 holosyst at LLSB  Abdomen: Soft, non-distended, non-tender  Extremities: Warm, LE edema improving  Neurology: Non-focal, alert and oriented times three.   Psych: Affect normal    Labs:    ( 10-27-23 @ 06:05 )               9.3    8.72  )--------( 152                  32.7     ( 10-27-23 @ 06:05 )     148  |  108  |  101.6  ---------------------<  99  3.2  |  29.0  |  3.10    Ca 7.6  Phos x   Mg 1.9    ( 10-21-23 @ 14:30 )  TropHS x     /    / CKMB x

## 2023-10-27 NOTE — PROGRESS NOTE ADULT - PROBLEM SELECTOR PLAN 2
c/w sildenafil 20 mg TID and Tyvaso.

## 2023-10-27 NOTE — PROGRESS NOTE ADULT - SUBJECTIVE AND OBJECTIVE BOX
[facility], THE [facility]                              [address] [place] [address]                                                 PHONE: [phone]                                                 FAX: [phone]  -----------------------------------------------------------------------------------------------  Pt seen and examined. FU for  shortness of breath     Overnight events/Complaints: Pt reports breathing better. remains on O2.     Vital Signs Last 24 Hrs  T(C): 36.6 (27 Oct 2023 05:18), Max: 36.9 (26 Oct 2023 16:28)  T(F): 97.8 (27 Oct 2023 05:18), Max: 98.4 (26 Oct 2023 16:28)  HR: 97 (27 Oct 2023 06:00) (71 - 97)  BP: 104/59 (27 Oct 2023 06:00) (101/54 - 120/63)  BP(mean): --  RR: 18 (27 Oct 2023 05:18) (18 - 18)  SpO2: 95% (27 Oct 2023 05:18) (93% - 95%)    Parameters below as of 27 Oct 2023 05:18  Patient On (Oxygen Delivery Method): nasal cannula  O2 Flow (L/min): 2    I&O's Summary    26 Oct 2023 07:01  -  27 Oct 2023 07:00  --------------------------------------------------------  IN: 250 mL / OUT: 1200 mL / NET: -950 mL    RELEVENT PHYSICAL EXAM:  Neck: No obvious JVD  Cardiovascular: regular S1, S2  Respiratory: Lungs clear to auscultation; no crepitations, no wheeze  Musculoskeletal: No edema        LABS:                        9.3    8.72  )-----------( 152      ( 27 Oct 2023 06:05 )             32.7     10-27    148<H>  |  108  |  101.6<H>  ----------------------------<  99  3.2<L>   |  29.0  |  3.10<H>    Ca    7.6<L>      27 Oct 2023 06:05  Mg     1.9     10-27      RADIOLOGY & ADDITIONAL STUDIES: (reviewed)  CXR was independently visualized/reviewed  and demonstrated: cardiomegaly    CARDIOLOGY TESTING:(reviewed)     12 lead EKG independently visualized/reviewed  and demonstrated paced    ECHOCARDIOGRAM independently visualized/reviewed and demonstrated :    1. Endocardial visualization was enhanced with intravenous echo contrast.   2. Left ventricular ejection fraction, by visual estimation, is 50 to   55%.   3. Normal global left ventricular systolic function.   4. Severely enlarged right atrium.   5. The mitral in-flow pattern reveals no discernable A-wave, therefore   no comment on diastolic function canbe made.   6. There is mild concentric left ventricular hypertrophy.   7. Severely enlarged right ventricle.   8. Moderately reduced RV systolic function.   9. Trace mitral valve regurgitation.  10. Moderate-severe tricuspid regurgitation.  11. TAVR in the aortic position.  12. Trivial paravalvular leak.  13. Peak aortic valve gradient is 18.5 mmHg and the mean gradient is 7.1   mmHg, which is probably normal in the setting of a prosthetic aortic   valve.    TELEMETRY independently visualized/reviewed and demonstrated : paced with PVCs    MEDICATIONS:(reviewed)  MEDICATIONS  (STANDING):  atorvastatin 20 milliGRAM(s) Oral at bedtime  buMETAnide Injectable 2 milliGRAM(s) IV Push every 8 hours  clotrimazole 1% Cream 1 Application(s) Topical two times a day  epoetin yuliana-epbx (RETACRIT) Injectable 60409 Unit(s) SubCutaneous every 7 days  folic acid 1 milliGRAM(s) Oral daily  heparin   Injectable 5000 Unit(s) SubCutaneous every 8 hours  influenza  Vaccine (HIGH DOSE) 0.7 milliLiter(s) IntraMuscular once  lactobacillus acidophilus 1 Tablet(s) Oral daily  levothyroxine 50 MICROGram(s) Oral daily  magnesium oxide 400 milliGRAM(s) Oral daily  metolazone 5 milliGRAM(s) Oral daily  metoprolol succinate ER 75 milliGRAM(s) Oral daily  metroNIDAZOLE    Tablet 500 milliGRAM(s) Oral every 8 hours  pantoprazole    Tablet 40 milliGRAM(s) Oral before breakfast  potassium chloride    Tablet ER 40 milliEquivalent(s) Oral once  sildenafil (REVATIO) 20 milliGRAM(s) Oral three times a day  Tyvaso (Treprostinil) 16 MICROGram(s) 16 MICROGram(s) Inhalation four times a day    ASSESSMENT AND PLAN:    78y Male with past medical history significant for HTN, HLD, neurogenic bladder, afib not on AC due to GI bleed, MV repair, JEANMARIE ligation, severe pulmonary HTN, TAVR, [facility] PPM, LVEF 50-55%, CKD p/w mechanical fall found to have worsening right heart failure.    Mechanical fall/right heart failure  -continue IV bumex and metolazone 5mg daily   -cr stable    HLD- continue atorvastatin    pulmonary htn- continue sildenafil and tyvaso    Afib- not on anticoagulation due to Gi bleed

## 2023-10-27 NOTE — CHART NOTE - NSCHARTNOTEFT_GEN_A_CORE
Source: Patient [x ]  Family [ ]   other [x ]    Current Diet: Diet, DASH/TLC:   Sodium & Cholesterol Restricted  1500mL Fluid Restriction ([id number])     Special Instructions for Nursin milliLiter(s) to 2000 milliLiter(s) fluid restriction (10-21-23 @ 16:59)    Patient reports [ ] nausea  [ ] vomiting [x ] diarrhea [ ] constipation  [ ]chewing problems [ ] swallowing issues  [ ] other:     PO intake:  < 50% [x ]   50-75%  [ ]   %  [ ]  other :    Current Weight:   (10/24) 157.4 lbs  (10/23) 266.9 lbs  ? accuracy of weights, noted with 2+ b/l foot, 3+ left arm and generalized continue to trend and maintain strict Is&Os     Pertinent Medications: MEDICATIONS  (STANDING):  folic acid 1 milliGRAM(s) Oral daily  lactobacillus acidophilus 1 Tablet(s) Oral daily  levothyroxine 50 MICROGram(s) Oral daily  magnesium oxide 400 milliGRAM(s) Oral daily  metolazone 5 milliGRAM(s) Oral daily  metroNIDAZOLE    Tablet 500 milliGRAM(s) Oral every 8 hours  pantoprazole    Tablet 40 milliGRAM(s) Oral before breakfast  potassium chloride    Tablet ER 40 milliEquivalent(s) Oral once    MEDICATIONS  (PRN):  ondansetron Injectable 4 milliGRAM(s) IV Push every 8 hours PRN Nausea and/or Vomiting    Pertinent Labs: CBC Full  -  ( 27 Oct 2023 06:05 )  WBC Count : 8.72 K/uL  RBC Count : 3.76 M/uL  Hemoglobin : 9.3 g/dL  Hematocrit : 32.7 %    10-27 Na148 mmol/L<H> Glu 99 mg/dL K+ 3.2 mmol/L<L> Cr  3.10 mg/dL<H> .6 mg/dL<H> Phos n/a   Alb n/a   PAB n/a       Skin: Stage II pressure injury to left buttocks, skin tear to right forearm and left knee, moisture associated dermatitis per documentation  Mo: 15    Nutrition focused physical exam conducted - found signs of malnutrition [ ]absent [ x]present    Subcutaneous fat loss: [ ] Orbital fat pads region, [ ]Buccal fat region, [ ]Triceps region,  [ ]Ribs region    Muscle wasting: [x ]Temples region, [x ]Clavicle region, [x ]Shoulder region, [ ]Scapula region, [ ]Interosseous region,  [ ]thigh region, [ ]Calf region    Estimated Needs:   [ x] no change since previous assessment  [ ] recalculated:     Current Nutrition Diagnosis: Pt remains at high nutrition risk secondary to malnutrition (moderate, acute) related to inability to meet sufficient protein-energy in setting of malabsorption secondary to diarrhea and skin breakdown as evidenced by meeting <50% nutrient needs >/5 days, mild muscle loss, 3+ generalized edema. Pt reports appetite/po intake has been minimal because he notices eating food has been exacerbating diarrhea. States today he only had a muffin for breakfast. Reports he is limiting food intake because he is worried about having to use the bathroom. RD to follow up.     Recommendations:   1) Continue diet as tolerated; fluid restriction per MD discretion.  2) Continue Bacid to support gut integrity.  3) Rx: Ensure Plus High Protein BID (350k debbie, 20g protein per serving).  4) Consider add Banatrol BID to add bulk to stool.  5) Continue folic acid, add MVI and vitamin C 500mg daily to aid in wound healing.  6) Monitor electrolytes and replete as needed.  7) Encourage po intake, monitor diet tolerance, and provide assistance at meals as needed.  8) Obtain daily weights to monitor trends.     Monitoring and Evaluation:   [ x] PO intake [ x] Tolerance to diet prescription [X] Weights  [X] Follow up per protocol [X] Labs: chem 8, mg, phos, H/H Source: Patient [x ]  Family [ ]   other [x ]    Current Diet: Diet, DASH/TLC:   Sodium & Cholesterol Restricted  1500mL Fluid Restriction ([id number])     Special Instructions for Nursin milliLiter(s) to 2000 milliLiter(s) fluid restriction (10-21-23 @ 16:59)    Patient reports [ ] nausea  [ ] vomiting [x ] diarrhea [ ] constipation  [ ]chewing problems [ ] swallowing issues  [ ] other:     PO intake:  < 50% [x ]   50-75%  [ ]   %  [ ]  other :    Current Weight:   (10/24) 157.4 lbs  (10/23) 266.9 lbs  ? accuracy of weights, noted with 2+ b/l foot, 3+ left arm and generalized continue to trend and maintain strict Is&Os     Pertinent Medications: MEDICATIONS  (STANDING):  folic acid 1 milliGRAM(s) Oral daily  lactobacillus acidophilus 1 Tablet(s) Oral daily  levothyroxine 50 MICROGram(s) Oral daily  magnesium oxide 400 milliGRAM(s) Oral daily  metolazone 5 milliGRAM(s) Oral daily  metroNIDAZOLE    Tablet 500 milliGRAM(s) Oral every 8 hours  pantoprazole    Tablet 40 milliGRAM(s) Oral before breakfast  potassium chloride    Tablet ER 40 milliEquivalent(s) Oral once    MEDICATIONS  (PRN):  ondansetron Injectable 4 milliGRAM(s) IV Push every 8 hours PRN Nausea and/or Vomiting    Pertinent Labs: CBC Full  -  ( 27 Oct 2023 06:05 )  WBC Count : 8.72 K/uL  RBC Count : 3.76 M/uL  Hemoglobin : 9.3 g/dL  Hematocrit : 32.7 %    10-27 Na148 mmol/L<H> Glu 99 mg/dL K+ 3.2 mmol/L<L> Cr  3.10 mg/dL<H> .6 mg/dL<H> Phos n/a   Alb n/a   PAB n/a       Skin: Stage II pressure injury to left buttocks, skin tear to right forearm and left knee, moisture associated dermatitis per documentation  Mo: 15    Nutrition focused physical exam conducted - found signs of malnutrition [ ]absent [ x]present    Subcutaneous fat loss: [ ] Orbital fat pads region, [ ]Buccal fat region, [ ]Triceps region,  [ ]Ribs region    Muscle wasting: [x ]Temples region, [x ]Clavicle region, [x ]Shoulder region, [ ]Scapula region, [ ]Interosseous region,  [ ]thigh region, [ ]Calf region    Estimated Needs:   [ x] no change since previous assessment  [ ] recalculated:     Current Nutrition Diagnosis: Pt remains at high nutrition risk secondary to malnutrition (moderate, acute) related to inability to meet sufficient protein-energy in setting of malabsorption secondary to diarrhea and skin breakdown as evidenced by likely meeting <50% nutrient needs >/5 days,  mild muscle loss, 3+ generalized edema. Pt reports appetite/po intake has been minimal because he notices eating food has been exacerbating diarrhea. States today he only had a muffin for breakfast. Reports he is limiting food intake because he is worried about having to use the bathroom. RD to follow up.     Recommendations:   1) Continue diet as tolerated; fluid restriction per MD discretion.  2) Continue Bacid to support gut integrity.  3) Rx: Ensure Plus High Protein BID (350k debbie, 20g protein per serving).  4) Consider add Banatrol BID to add bulk to stool.  5) Continue folic acid, add MVI and vitamin C 500mg daily to aid in wound healing.  6) Monitor electrolytes and replete as needed.  7) Encourage po intake, monitor diet tolerance, and provide assistance at meals as needed.  8) Obtain daily weights to monitor trends.     Monitoring and Evaluation:   [ x] PO intake [ x] Tolerance to diet prescription [X] Weights  [X] Follow up per protocol [X] Labs: chem 8, mg, phos, H/H

## 2023-10-27 NOTE — PROGRESS NOTE ADULT - PROBLEM SELECTOR PLAN 4
- appreciate nephrology recs  - c/w diuresis as above.

## 2023-10-27 NOTE — PROGRESS NOTE ADULT - PROBLEM SELECTOR PLAN 5
- frequent PVCs and short runs of NSVT on tele  - recommend EP/[place] c/s Re: frequent PVCs, interrogate PPM  - replete electrolytes PRN to keep K >4 and Mag >2.
- frequent PVCs and short runs of NSVT on tele  - recommend EP/[place] c/s Re: frequent PVCs, interrogate PPM  - replete electrolytes PRN to keep K >4 and Mag >2.
- frequent PVCs and short runs of NSVT on tele  - recommend EP/[place] c/s Re: frequent PVCs, interrogate PPM  - replete electrolytes PRN to keep K >4 and Mag >2.  - would add standing KCl 40 meq PO daily.

## 2023-10-27 NOTE — PROGRESS NOTE ADULT - SUBJECTIVE AND OBJECTIVE BOX
LETICIA MCCLURE  ----------------------------------------  The patient was seen earlier at bedside. Patient with heart failure. Denied dyspnea or chest pain at rest. Noted improvement in diarrhea with only one bowel movement yesterday.    Vital Signs Last 24 Hrs  T(C): 36.6 (27 Oct 2023 09:18), Max: 36.9 (26 Oct 2023 16:28)  T(F): 97.8 (27 Oct 2023 09:18), Max: 98.4 (26 Oct 2023 16:28)  HR: 104 (27 Oct 2023 09:18) (71 - 104)  BP: 91/53 (27 Oct 2023 09:18) (91/53 - 120/63)  BP(mean): --  RR: 18 (27 Oct 2023 05:18) (18 - 18)  SpO2: 95% (27 Oct 2023 05:18) (93% - 95%)    Parameters below as of 27 Oct 2023 05:18  Patient On (Oxygen Delivery Method): nasal cannula  O2 Flow (L/min): 2    PHYSICAL EXAMINATION:  ----------------------------------------  General appearance: No acute distress, Awake, Alert  HEENT: Normocephalic, Atraumatic, Conjunctiva clear, EOMI  Neck: Supple, No JVD, No tenderness  Lungs: No wheezes, No rales, Diminished breath sounds at the bases  Cardiovascular: S1S2, Regular rhythm  Abdomen: Soft, Nontender, Nondistended, No guarding/rebound, Positive bowel sounds  Extremities: No clubbing, No cyanosis, No calf tenderness, Lower extremity edema  Neuro: Strength equal bilaterally, No tremors  Psychiatric: Appropriate mood, Normal affect    LABORATORY STUDIES:  ----------------------------------------             9.3    8.72  )-----------( 152      ( 27 Oct 2023 06:05 )             32.7     10-27    148<H>  |  108  |  101.6<H>  ----------------------------<  99  3.2<L>   |  29.0  |  3.10<H>    Ca    7.6<L>      27 Oct 2023 06:05  Mg     1.9     10-27    Urinalysis Basic - ( 27 Oct 2023 06:05 )  Color: x / Appearance: x / SG: x / pH: x  Gluc: 99 mg/dL / Ketone: x  / Bili: x / Urobili: x   Blood: x / Protein: x / Nitrite: x   Leuk Esterase: x / RBC: x / WBC x   Sq Epi: x / Non Sq Epi: x / Bacteria: x    MEDICATIONS  (STANDING):  atorvastatin 20 milliGRAM(s) Oral at bedtime  buMETAnide Injectable 2 milliGRAM(s) IV Push every 8 hours  clotrimazole 1% Cream 1 Application(s) Topical two times a day  epoetin yuliana-epbx (RETACRIT) Injectable 71710 Unit(s) SubCutaneous every 7 days  folic acid 1 milliGRAM(s) Oral daily  heparin   Injectable 5000 Unit(s) SubCutaneous every 8 hours  influenza  Vaccine (HIGH DOSE) 0.7 milliLiter(s) IntraMuscular once  lactobacillus acidophilus 1 Tablet(s) Oral daily  levothyroxine 50 MICROGram(s) Oral daily  magnesium oxide 400 milliGRAM(s) Oral daily  metolazone 5 milliGRAM(s) Oral daily  metoprolol succinate ER 75 milliGRAM(s) Oral daily  metroNIDAZOLE    Tablet 500 milliGRAM(s) Oral every 8 hours  pantoprazole    Tablet 40 milliGRAM(s) Oral before breakfast  potassium chloride    Tablet ER 40 milliEquivalent(s) Oral once  sildenafil (REVATIO) 20 milliGRAM(s) Oral three times a day  Tyvaso (Treprostinil) 16 MICROGram(s) 16 MICROGram(s) Inhalation four times a day    MEDICATIONS  (PRN):  acetaminophen     Tablet .. 650 milliGRAM(s) Oral every 6 hours PRN Temp greater or equal to 38C (100.4F), Mild Pain (1 - 3)  loperamide 2 milliGRAM(s) Oral four times a day PRN Diarrhea  melatonin 3 milliGRAM(s) Oral at bedtime PRN Insomnia  ondansetron Injectable 4 milliGRAM(s) IV Push every 8 hours PRN Nausea and/or Vomiting      ASSESSMENT / PLAN:  ----------------------------------------  78M with a history of severe pulmonary hypertension, atrial fibrillation, neurogenic bladder, hypothyroidism, hypertension, and gastroesophageal reflux who presented after episodes of falling at home and was found to have hypoxia requiring initiation of supplemental oxygen. Bumetanide was initiated for diuresis with improvement in the the hypoxia. Renal function remained stable. He had episodes of diarrhea was continued on the course of metronidazole which was initiated as an outpatient.    Acute hypoxic respiratory failure  - Pulmonary hypertension and heart failure  - Continue on supplemental oxygen as need with titration as tolerated  - Oxygen saturation improved this morning, for trial off oxygen as tolerated    Acute on chronic diastolic heart failure  - Elevated troponin and BNP levels noted on presentation  - Monitoring urine output and daily weights  - Echocardiogram noted ejection fraction of 50 to 55%, moderately reduced right ventricular systolic function and moderate pulmonary hypertension  - On bumetanide and metolazone    Pulmonary hypertension  - Continue on sildenafil  - Awaiting family to bring in home treprostinil    Neurogenic bladder  - Previously self-catheterizing but recently required Jain catheter  - Draining clear urine  - For further follow up with Urology on an outpatient basis    Chronic kidney disease with acute kidney injury  - Monitoring renal function on diuretic therapy  - Stable  - Nephrology following, may require renal replacement therapy in the future    Anemia  - On epoetin yuliana  - Status post iron sucrose infusion    Hypothyroidism  - On levothyroxine    Atrial fibrillation  - On metoprolol  - Not on anticoagulation at home    Fall  - Mechanical in nature  - No loss of consciousness  - Xray of the knee and hand were without fractures  - Physical Therapy evaluation noted and thought to benefit from further treatment at a rehabilitation facility    Bacteremia  - Blood culture (10/21) grew Staph epidermidis in one bottle  - Suspect contaminant  - Remains afebrile and without leukocytosis    Diarrhea  - Appears improved  - Afebrile and without leukocytosis  - Abdominal examination benign  - To complete the course of metronidazole which was initiated as outpatient  - Loperamide as needed    Overall prognosis guarded

## 2023-10-27 NOTE — PROGRESS NOTE ADULT - ASSESSMENT
CKD IV: Baseline creat 2.5 - 3, now trending down  BIV CM  (R > L)==> fluid overload improved   Neurogenic bladder with chronic eubanks - good UO  Renal sono 1/23 - Atrophic echogenic kidneys , w/o hydro  - avoid potential nephrotoxins  - cont diuretics --> expect azotemia  - follow labs  - likely will require RRT in future    Anemia :  - cont TAD and IV Fe  - target Hgb > 10.0    Dr Collado spoke with son earlier via telephone

## 2023-10-27 NOTE — PROGRESS NOTE ADULT - ASSESSMENT
Initial HF c/s: June De La Torre MD  Outpt PAH expert: Kandis Zee MD    77 y/o M with a history of RV dysfunction in the setting of severe PH (followed by Dr. Kandis Singh at [facility] on sildenafil and recently started on Tyvaso), MV repair and JEANMARIE ligation in 2004 at [facility], recent TAVR at [place], s/p PPM, AFib not on AC due to GI bleed, HTN, HLD, CKD (b/l Cr 2.5-3), and neurogenic bladder, who presented after a fall at home.     While in the ED patient was noted to be hypotensive and hypoxic into the Low 80s. Patient was placed on oxygen with noted improvement. Pro-BNP was 15,886 on admission. He has been diuresing on intermittent IV Bumex and was noted to have worsening renal function. HF consulted for further management. He has responded well to IV diuretics and now appears close to euvolemic on exam.    Cardiac Studies:  10/22/23 TTE: LVIDd 4.62cm, LVEF 50-55%, severe RVE with mod RV dysfunction, normal LA size, severe SONAM, trace MR, mod-severe TR, TAVR with normal gradients, trivial paravalvular leak, est RAP 15 mmHg, est PASP 53.7 mmHg.    R/LHC with Dr Fernandez at [facility] 9/18/23 (report not available): normal cors except for mRCA 30%, no hemodynamic significant response to nitric

## 2023-10-27 NOTE — PROGRESS NOTE ADULT - SUBJECTIVE AND OBJECTIVE BOX
Patient seen and examined    I&O's Summary    26 Oct 2023 07:01  -  27 Oct 2023 07:00  --------------------------------------------------------  IN: 250 mL / OUT: 1200 mL / NET: -950 mL        REVIEW OF SYSTEMS:    CONSTITUTIONAL: No F/C  RESPIRATORY: No cough,  No SOB  CARDIOVASCULAR: No CP/palpitations,    GASTROINTESTINAL: No abdominal pain  or NVD   GENITOURINARY: No UTI sx, eubanks drainijng  NEUROLOGICAL: No headaches, NO wk/numbness  MUSCULOSKELETAL: hip pain better  EXTREMITIES : no swelling,    Vital Signs Last 24 Hrs  T(C): 36.6 (27 Oct 2023 09:18), Max: 36.9 (26 Oct 2023 16:28)  T(F): 97.8 (27 Oct 2023 09:18), Max: 98.4 (26 Oct 2023 16:28)  HR: 104 (27 Oct 2023 09:18) (71 - 104)  BP: 91/53 (27 Oct 2023 09:18) (91/53 - 120/63)  BP(mean): --  RR: 18 (27 Oct 2023 05:18) (18 - 18)  SpO2: 95% (27 Oct 2023 05:18) (93% - 95%)    Parameters below as of 27 Oct 2023 05:18  Patient On (Oxygen Delivery Method): nasal cannula  O2 Flow (L/min): 2      PHYSICAL EXAM:    GENERAL: NAD,  OOb/Ch  EYES:  conjunctiva and sclera clear  NECK: Supple, No JVD/Bruit  NERVOUS SYSTEM:  A/O x3,   CHEST:  No rales or rhonchi  HEART:  RRR, No murmur  ABDOMEN: Soft, Obese NT BS+  EXTREMITIES:  No Edema;  SKIN: No rashes    LABS:                          9.3    8.72  )-----------( 152      ( 27 Oct 2023 06:05 )             32.7     10-27    148<H>  |  108  |  101.6<H>  ----------------------------<  99  3.2<L>   |  29.0  |  3.10<H>    Ca    7.6<L>      27 Oct 2023 06:05  Mg     1.9     10-27        MEDICATIONS  (STANDING):  acetaminophen     Tablet .. PRN  atorvastatin  buMETAnide Injectable  clotrimazole 1% Cream  epoetin yuliana-epbx (RETACRIT) Injectable  folic acid  heparin   Injectable  influenza  Vaccine (HIGH DOSE)  lactobacillus acidophilus  levothyroxine  loperamide PRN  magnesium oxide  melatonin PRN  metolazone  metoprolol succinate ER  metroNIDAZOLE    Tablet  ondansetron Injectable PRN  pantoprazole    Tablet  potassium chloride    Tablet ER  sildenafil (REVATIO)  Tyvaso (Treprostinil) 16 MICROGram(s)

## 2023-10-27 NOTE — DIETITIAN NUTRITION RISK NOTIFICATION - ADDITIONAL COMMENTS/DIETITIAN RECOMMENDATIONS
Continue diet as tolerated; fluid restriction per MD discretion.  Continue Bacid to support gut integrity.  Rx: Ensure Plus High Protein BID (350k debbie, 20g protein per serving).  Consider add Banatrol BID to add bulk to stool.  Continue folic acid, add MVI and vitamin C 500mg daily to aid in wound healing.  Monitor electrolytes and replete as needed.  Encourage po intake, monitor diet tolerance, and provide assistance at meals as needed.  Obtain daily weights to monitor trends.

## 2023-10-27 NOTE — PROGRESS NOTE ADULT - PROBLEM SELECTOR PLAN 1
HFpEF with RV dysfunction  - Clinically close to euvolemic with lukewarm extremities  - GDMT: Limited by renal dysfunction. Would not start SGLT2i given eGFR <25 and neurogenic bladder with the need to self cath (being treated for UTI with PO Flagyl). No MRA due to elevated creatinine.   - Diuretics: will switch Bumex 2 mg IV TID to 3 mg PO BID starting tomorrow. Continue metolazone 5 mg daily (if BUN continues to rise, will stop)  - Strict I/Os, daily standing weights.  - D/c planning per primary team. Will arrange follow-up with our HF team in [place] prior to discharge. HFpEF with RV dysfunction  - Clinically close to euvolemic with lukewarm extremities  - GDMT: Limited by renal dysfunction. Would not start SGLT2i given eGFR <25 and neurogenic bladder with the need to self cath (being treated for UTI with PO Flagyl). No MRA due to elevated creatinine.   - Diuretics: will switch Bumex 2 mg IV TID to 3 mg PO BID starting tomorrow. Continue metolazone 5 mg daily (if BUN continues to rise, will stop)  - Strict I/Os, daily standing weights.  - We will follow intermittently.   - D/c planning per primary team. Pt should continue to follow with his pHTN team at [facility]/.

## 2023-10-27 NOTE — PROGRESS NOTE ADULT - NS ATTEND AMEND GEN_ALL_CORE FT
Pt feels much better. Remains debilitated.   Clinically appears close to euvolemia. OK to transition to oral diuretics.   No active HF issues.  Should follow up with his PHTN team at [facility]/ upon discharge.   Please call us if there are any further questions/concerns.

## 2023-10-27 NOTE — DIETITIAN NUTRITION RISK NOTIFICATION - TREATMENT: THE FOLLOWING DIET HAS BEEN RECOMMENDED
Diet, DASH/TLC:   Sodium & Cholesterol Restricted  1500mL Fluid Restriction ([id number])     Special Instructions for Nursin milliLiter(s) to 2000 milliLiter(s) fluid restriction (10-21-23 @ 16:59) [Active]

## 2023-10-27 NOTE — PROGRESS NOTE ADULT - PROBLEM SELECTOR PLAN 3
- not on AC due to h/i GI bleed  - c/w Toprol XL 75 mg daily.

## 2023-10-28 LAB
ANION GAP SERPL CALC-SCNC: 12 MMOL/L — SIGNIFICANT CHANGE UP (ref 5–17)
ANION GAP SERPL CALC-SCNC: 12 MMOL/L — SIGNIFICANT CHANGE UP (ref 5–17)
BUN SERPL-MCNC: 101.1 MG/DL — HIGH (ref 8–20)
BUN SERPL-MCNC: 101.1 MG/DL — HIGH (ref 8–20)
CALCIUM SERPL-MCNC: 7.3 MG/DL — LOW (ref 8.4–10.5)
CALCIUM SERPL-MCNC: 7.3 MG/DL — LOW (ref 8.4–10.5)
CHLORIDE SERPL-SCNC: 104 MMOL/L — SIGNIFICANT CHANGE UP (ref 96–108)
CHLORIDE SERPL-SCNC: 104 MMOL/L — SIGNIFICANT CHANGE UP (ref 96–108)
CO2 SERPL-SCNC: 29 MMOL/L — SIGNIFICANT CHANGE UP (ref 22–29)
CO2 SERPL-SCNC: 29 MMOL/L — SIGNIFICANT CHANGE UP (ref 22–29)
CREAT SERPL-MCNC: 3.13 MG/DL — HIGH (ref 0.5–1.3)
CREAT SERPL-MCNC: 3.13 MG/DL — HIGH (ref 0.5–1.3)
EGFR: 20 ML/MIN/1.73M2 — LOW
EGFR: 20 ML/MIN/1.73M2 — LOW
GLUCOSE SERPL-MCNC: 100 MG/DL — HIGH (ref 70–99)
GLUCOSE SERPL-MCNC: 100 MG/DL — HIGH (ref 70–99)
HCT VFR BLD CALC: 31.5 % — LOW (ref 39–50)
HCT VFR BLD CALC: 31.5 % — LOW (ref 39–50)
HGB BLD-MCNC: 8.8 G/DL — LOW (ref 13–17)
HGB BLD-MCNC: 8.8 G/DL — LOW (ref 13–17)
MAGNESIUM SERPL-MCNC: 1.8 MG/DL — SIGNIFICANT CHANGE UP (ref 1.6–2.6)
MAGNESIUM SERPL-MCNC: 1.8 MG/DL — SIGNIFICANT CHANGE UP (ref 1.6–2.6)
MCHC RBC-ENTMCNC: 24.4 PG — LOW (ref 27–34)
MCHC RBC-ENTMCNC: 24.4 PG — LOW (ref 27–34)
MCHC RBC-ENTMCNC: 27.9 GM/DL — LOW (ref 32–36)
MCHC RBC-ENTMCNC: 27.9 GM/DL — LOW (ref 32–36)
MCV RBC AUTO: 87.5 FL — SIGNIFICANT CHANGE UP (ref 80–100)
MCV RBC AUTO: 87.5 FL — SIGNIFICANT CHANGE UP (ref 80–100)
PLATELET # BLD AUTO: 140 K/UL — LOW (ref 150–400)
PLATELET # BLD AUTO: 140 K/UL — LOW (ref 150–400)
POTASSIUM SERPL-MCNC: 3 MMOL/L — LOW (ref 3.5–5.3)
POTASSIUM SERPL-MCNC: 3 MMOL/L — LOW (ref 3.5–5.3)
POTASSIUM SERPL-SCNC: 3 MMOL/L — LOW (ref 3.5–5.3)
POTASSIUM SERPL-SCNC: 3 MMOL/L — LOW (ref 3.5–5.3)
RBC # BLD: 3.6 M/UL — LOW (ref 4.2–5.8)
RBC # BLD: 3.6 M/UL — LOW (ref 4.2–5.8)
RBC # FLD: 19.1 % — HIGH (ref 10.3–14.5)
RBC # FLD: 19.1 % — HIGH (ref 10.3–14.5)
SODIUM SERPL-SCNC: 145 MMOL/L — SIGNIFICANT CHANGE UP (ref 135–145)
SODIUM SERPL-SCNC: 145 MMOL/L — SIGNIFICANT CHANGE UP (ref 135–145)
WBC # BLD: 8.05 K/UL — SIGNIFICANT CHANGE UP (ref 3.8–10.5)
WBC # BLD: 8.05 K/UL — SIGNIFICANT CHANGE UP (ref 3.8–10.5)
WBC # FLD AUTO: 8.05 K/UL — SIGNIFICANT CHANGE UP (ref 3.8–10.5)
WBC # FLD AUTO: 8.05 K/UL — SIGNIFICANT CHANGE UP (ref 3.8–10.5)

## 2023-10-28 PROCEDURE — 99232 SBSQ HOSP IP/OBS MODERATE 35: CPT

## 2023-10-28 RX ORDER — POTASSIUM CHLORIDE 20 MEQ
40 PACKET (EA) ORAL EVERY 4 HOURS
Refills: 0 | Status: COMPLETED | OUTPATIENT
Start: 2023-10-28 | End: 2023-10-28

## 2023-10-28 RX ADMIN — BUMETANIDE 3 MILLIGRAM(S): 0.25 INJECTION INTRAMUSCULAR; INTRAVENOUS at 05:58

## 2023-10-28 RX ADMIN — Medication 40 MILLIEQUIVALENT(S): at 08:50

## 2023-10-28 RX ADMIN — Medication 1 APPLICATION(S): at 17:41

## 2023-10-28 RX ADMIN — Medication 20 MILLIGRAM(S): at 06:01

## 2023-10-28 RX ADMIN — HEPARIN SODIUM 5000 UNIT(S): 5000 INJECTION INTRAVENOUS; SUBCUTANEOUS at 13:03

## 2023-10-28 RX ADMIN — MAGNESIUM OXIDE 400 MG ORAL TABLET 400 MILLIGRAM(S): 241.3 TABLET ORAL at 13:03

## 2023-10-28 RX ADMIN — Medication 50 MICROGRAM(S): at 05:58

## 2023-10-28 RX ADMIN — HEPARIN SODIUM 5000 UNIT(S): 5000 INJECTION INTRAVENOUS; SUBCUTANEOUS at 21:05

## 2023-10-28 RX ADMIN — PANTOPRAZOLE SODIUM 40 MILLIGRAM(S): 20 TABLET, DELAYED RELEASE ORAL at 06:02

## 2023-10-28 RX ADMIN — HEPARIN SODIUM 5000 UNIT(S): 5000 INJECTION INTRAVENOUS; SUBCUTANEOUS at 06:01

## 2023-10-28 RX ADMIN — Medication 1 MILLIGRAM(S): at 13:03

## 2023-10-28 RX ADMIN — Medication 75 MILLIGRAM(S): at 06:00

## 2023-10-28 RX ADMIN — ATORVASTATIN CALCIUM 20 MILLIGRAM(S): 80 TABLET, FILM COATED ORAL at 21:04

## 2023-10-28 RX ADMIN — BUMETANIDE 3 MILLIGRAM(S): 0.25 INJECTION INTRAMUSCULAR; INTRAVENOUS at 13:04

## 2023-10-28 RX ADMIN — Medication 20 MILLIGRAM(S): at 21:05

## 2023-10-28 RX ADMIN — Medication 1 APPLICATION(S): at 06:10

## 2023-10-28 RX ADMIN — Medication 1 TABLET(S): at 12:56

## 2023-10-28 RX ADMIN — Medication 20 MILLIGRAM(S): at 13:03

## 2023-10-28 RX ADMIN — Medication 40 MILLIEQUIVALENT(S): at 13:03

## 2023-10-28 NOTE — PROGRESS NOTE ADULT - SUBJECTIVE AND OBJECTIVE BOX
Patient seen and examined    Feels fine  no c/o CP SOB NV   No swelling feet    Vital Signs Last 24 Hrs  T(C): 36.4 (28 Oct 2023 07:38), Max: 37 (28 Oct 2023 04:54)  T(F): 97.6 (28 Oct 2023 07:38), Max: 98.6 (28 Oct 2023 04:54)  HR: 86 (28 Oct 2023 07:38) (63 - 97)  BP: 98/50 (28 Oct 2023 07:38) (92/59 - 113/63)  BP(mean): --  RR: 17 (28 Oct 2023 04:54) (17 - 18)  SpO2: 94% (28 Oct 2023 07:38) (92% - 97%)    Parameters below as of 28 Oct 2023 07:38  Patient On (Oxygen Delivery Method): nasal cannula        PHYSICAL EXAM    GENERAL: NAD,   EYES:  conjunctiva and sclera clear  NECK: Supple, No JVD/Bruit  NERVOUS SYSTEM:  A/O x3,   CHEST:  No rales, No rhonchi  HEART:  RRR, No murmur  ABDOMEN: Soft, NT/ND BS+  EXTREMITIES:  No Edema;  SKIN: No rashes    28 Oct 2023 04:57    145    |  104    |  101.1  ----------------------------<  100    3.0     |  29.0   |  3.13     Ca    7.3        28 Oct 2023 04:57  Mg     1.8       28 Oct 2023 04:57                            8.8    8.05  )-----------( 140      ( 28 Oct 2023 04:57 )             31.5         Continue present treatment

## 2023-10-28 NOTE — PROGRESS NOTE ADULT - SUBJECTIVE AND OBJECTIVE BOX
[place] [facility] - [facility], THE HEART CENTER                                   [address] [place] [address]                                                      PHONE: [phone]                                                         FAX: [phone]  [email or web address]  ---------------------------------------------------------------------------------------------------------------------------------    Overnight events/patient complaints:  Feels well at rest, still on 1L NC, sat 91-92%    No Known Drug Allergies  adhesives (Other)    MEDICATIONS  (STANDING):  atorvastatin 20 milliGRAM(s) Oral at bedtime  buMETAnide 3 milliGRAM(s) Oral two times a day  clotrimazole 1% Cream 1 Application(s) Topical two times a day  epoetin yuliana-epbx (RETACRIT) Injectable 50759 Unit(s) SubCutaneous every 7 days  folic acid 1 milliGRAM(s) Oral daily  heparin   Injectable 5000 Unit(s) SubCutaneous every 8 hours  influenza  Vaccine (HIGH DOSE) 0.7 milliLiter(s) IntraMuscular once  lactobacillus acidophilus 1 Tablet(s) Oral daily  levothyroxine 50 MICROGram(s) Oral daily  magnesium oxide 400 milliGRAM(s) Oral daily  metolazone 5 milliGRAM(s) Oral daily  metoprolol succinate ER 75 milliGRAM(s) Oral daily  pantoprazole    Tablet 40 milliGRAM(s) Oral before breakfast  potassium chloride    Tablet ER 40 milliEquivalent(s) Oral every 4 hours  sildenafil (REVATIO) 20 milliGRAM(s) Oral three times a day  Tyvaso (Treprostinil) 16 MICROGram(s) 16 MICROGram(s) Inhalation four times a day    MEDICATIONS  (PRN):  acetaminophen     Tablet .. 650 milliGRAM(s) Oral every 6 hours PRN Temp greater or equal to 38C (100.4F), Mild Pain (1 - 3)  loperamide 2 milliGRAM(s) Oral four times a day PRN Diarrhea  melatonin 3 milliGRAM(s) Oral at bedtime PRN Insomnia  ondansetron Injectable 4 milliGRAM(s) IV Push every 8 hours PRN Nausea and/or Vomiting      Vital Signs Last 24 Hrs  T(C): 36.4 (28 Oct 2023 07:38), Max: 37 (28 Oct 2023 04:54)  T(F): 97.6 (28 Oct 2023 07:38), Max: 98.6 (28 Oct 2023 04:54)  HR: 86 (28 Oct 2023 07:38) (63 - 97)  BP: 98/50 (28 Oct 2023 07:38) (92/59 - 113/63)  BP(mean): --  RR: 17 (28 Oct 2023 04:54) (17 - 18)  SpO2: 94% (28 Oct 2023 07:38) (92% - 97%)    Parameters below as of 28 Oct 2023 07:38  Patient On (Oxygen Delivery Method): nasal cannula      ICU Vital Signs Last 24 Hrs  LETICIA MCCLURE  I&O's Detail    27 Oct 2023 07:01  -  28 Oct 2023 07:00  --------------------------------------------------------  IN:  Total IN: 0 mL    OUT:    Indwelling Catheter - Urethral (mL): 650 mL    Voided (mL): 125 mL  Total OUT: 775 mL    Total NET: -775 mL        I&O's Summary    27 Oct 2023 07:01  -  28 Oct 2023 07:00  --------------------------------------------------------  IN: 0 mL / OUT: 775 mL / NET: -775 mL      Drug Dosing Weight  LETICIA MCCLURE      PHYSICAL EXAM:  General: Appears comfortable, alert and cooperative.  HEENT: Normocephalic, atraumatic.  Eyes: Pupils reactive, cornea wnl.  Neck: Supple, no nodes adenopathy; no JVD, carotid bruit or thyromegaly.  CARDIOVASCULAR: Regular S1 S2 with no murmur, rub, gallop or lift.   LUNGS: No rales, rhonchi or wheeze. Normal breath sounds bilaterally.  ABDOMEN: Soft, nontender without mass or organomegaly. bowel sounds normoactive.  EXTREMITIES: No clubbing, cyanosis. 1+ edema. Distal pulses wnl.   SKIN: Warm and dry with normal turgor.  NEURO: Alert/oriented x 3/normal motor exam  PSYCH: Appropriate affect        LABS:                        8.8    8.05  )-----------( 140      ( 28 Oct 2023 04:57 )             31.5     10-28    145  |  104  |  101.1<H>  ----------------------------<  100<H>  3.0<L>   |  29.0  |  3.13<H>    Ca    7.3<L>      28 Oct 2023 04:57  Mg     1.8     10-28      LETICIA MCCLURE        Urinalysis Basic - ( 28 Oct 2023 04:57 )    Color: x / Appearance: x / SG: x / pH: x  Gluc: 100 mg/dL / Ketone: x  / Bili: x / Urobili: x   Blood: x / Protein: x / Nitrite: x   Leuk Esterase: x / RBC: x / WBC x   Sq Epi: x / Non Sq Epi: x / Bacteria: x      ASSESSMENT AND PLAN:  In summary, LETICIA MCCLURE is an 78y Male with HTN, HLD, neurogenic bladder, afib not on AC due to GI bleed, MV repair, JEANMARIE ligation, severe pulmonary HTN, TAVR, St Erick PPM, LVEF 50-55%, CKD p/w mechanical fall found to have worsening right heart failure.    - Still requiring higher O2 than baseline (94-95% on RA)  - Appreciate CHF input, continue bumex and metolazone  - Careful repletion of K, keep K > 4, Mg > 2

## 2023-10-28 NOTE — PROGRESS NOTE ADULT - ASSESSMENT
78M with a history of severe pulmonary hypertension, atrial fibrillation, neurogenic bladder, hypothyroidism, hypertension, and gastroesophageal reflux who presented after episodes of falling at home and was found to have hypoxia requiring initiation of supplemental oxygen. Bumetanide was initiated for diuresis with improvement in the the hypoxia. Renal function remained stable. He had episodes of diarrhea was continued on the course of metronidazole which was initiated as an outpatient.    #Acute hypoxic respiratory failure 2/2 Acute on chronic diastolic heart failure and Pulmonary hypertension  Continue on supplemental oxygen as need with titration as tolerated  Monitoring urine output and daily weights  Echocardiogram noted ejection fraction of 50 to 55%, moderately reduced right ventricular systolic function and moderate pulmonary hypertension  Changed to PO bumetanide and metolazone  HF recs birgit  Continue on sildenafil  Awaiting family to bring in home treprostinil    #Neurogenic bladder  Previously self-catheterizing but recently required Jain catheter  Draining clear urine  For further follow up with Urology on an outpatient basis    #Chronic kidney disease with acute kidney injury  Monitoring renal function on diuretic therapy  Stable  Nephrology following, may require renal replacement therapy in the future    #Anemia  On epoetin yuliana  Status post iron sucrose infusion    #Hypothyroidism  On levothyroxine    #Atrial fibrillation  On metoprolol  Not on anticoagulation at home    #Fall  Mechanical in nature  No loss of consciousness  Xray of the knee and hand were without fractures  Physical Therapy evaluation noted and thought to benefit from further treatment at a rehabilitation facility    #Bacteremia  Blood culture (10/21) grew Staph epidermidis in one bottle  Suspect contaminant  Remains afebrile and without leukocytosis    #Diarrhea  Appears improved  Afebrile and without leukocytosis  Abdominal examination benign  To complete the course of metronidazole which was initiated as outpatient  Loperamide as needed    DVT ppx: HSQ  Dispo: pending [facility] in 1-2 days    Overall prognosis guarded

## 2023-10-28 NOTE — PROGRESS NOTE ADULT - SUBJECTIVE AND OBJECTIVE BOX
Chris Herrera M.D.    Patient is a 78y old  Male who presents with a chief complaint of Hypoxic respiratory failure (28 Oct 2023 12:22)      SUBJECTIVE / OVERNIGHT EVENTS: Pt feels well, no concerns. No home O2 requirement.     Patient denies chest pain, SOB, abd pain, N/V, fever, chills, dysuria or any other complaints. All remainder ROS negative.     MEDICATIONS  (STANDING):  atorvastatin 20 milliGRAM(s) Oral at bedtime  buMETAnide 3 milliGRAM(s) Oral two times a day  clotrimazole 1% Cream 1 Application(s) Topical two times a day  epoetin yuliana-epbx (RETACRIT) Injectable 40564 Unit(s) SubCutaneous every 7 days  folic acid 1 milliGRAM(s) Oral daily  heparin   Injectable 5000 Unit(s) SubCutaneous every 8 hours  influenza  Vaccine (HIGH DOSE) 0.7 milliLiter(s) IntraMuscular once  lactobacillus acidophilus 1 Tablet(s) Oral daily  levothyroxine 50 MICROGram(s) Oral daily  magnesium oxide 400 milliGRAM(s) Oral daily  metolazone 5 milliGRAM(s) Oral daily  metoprolol succinate ER 75 milliGRAM(s) Oral daily  pantoprazole    Tablet 40 milliGRAM(s) Oral before breakfast  sildenafil (REVATIO) 20 milliGRAM(s) Oral three times a day  Tyvaso (Treprostinil) 16 MICROGram(s) 16 MICROGram(s) Inhalation four times a day    MEDICATIONS  (PRN):  acetaminophen     Tablet .. 650 milliGRAM(s) Oral every 6 hours PRN Temp greater or equal to 38C (100.4F), Mild Pain (1 - 3)  loperamide 2 milliGRAM(s) Oral four times a day PRN Diarrhea  melatonin 3 milliGRAM(s) Oral at bedtime PRN Insomnia  ondansetron Injectable 4 milliGRAM(s) IV Push every 8 hours PRN Nausea and/or Vomiting      I&O's Summary    27 Oct 2023 07:01  -  28 Oct 2023 07:00  --------------------------------------------------------  IN: 0 mL / OUT: 775 mL / NET: -775 mL        PHYSICAL EXAM:  Vital Signs Last 24 Hrs  T(C): 36.4 (28 Oct 2023 07:38), Max: 37 (28 Oct 2023 04:54)  T(F): 97.6 (28 Oct 2023 07:38), Max: 98.6 (28 Oct 2023 04:54)  HR: 86 (28 Oct 2023 07:38) (63 - 97)  BP: 98/50 (28 Oct 2023 07:38) (92/59 - 113/63)  BP(mean): --  RR: 17 (28 Oct 2023 04:54) (17 - 18)  SpO2: 94% (28 Oct 2023 07:38) (92% - 97%)    Parameters below as of 28 Oct 2023 07:38  Patient On (Oxygen Delivery Method): nasal cannula    CONSTITUTIONAL: NAD, on NC  RESPIRATORY: Normal respiratory effort; course bs b/l  CARDIOVASCULAR: Regular rate and rhythm, normal S1 and S2, no LE edema  ABDOMEN: Nontender to palpation, normoactive bowel sounds  PSYCH: A+O x3; affect appropriate      LABS:                        8.8    8.05  )-----------( 140      ( 28 Oct 2023 04:57 )             31.5     10-28    145  |  104  |  101.1<H>  ----------------------------<  100<H>  3.0<L>   |  29.0  |  3.13<H>    Ca    7.3<L>      28 Oct 2023 04:57  Mg     1.8     10-28            Urinalysis Basic - ( 28 Oct 2023 04:57 )    Color: x / Appearance: x / SG: x / pH: x  Gluc: 100 mg/dL / Ketone: x  / Bili: x / Urobili: x   Blood: x / Protein: x / Nitrite: x   Leuk Esterase: x / RBC: x / WBC x   Sq Epi: x / Non Sq Epi: x / Bacteria: x        CAPILLARY BLOOD GLUCOSE          RADIOLOGY & ADDITIONAL TESTS:  Results Reviewed:   Imaging Personally Reviewed:  Electrocardiogram Personally Reviewed:

## 2023-10-28 NOTE — PROGRESS NOTE ADULT - ASSESSMENT
CKD IV: Baseline creat 2.5 - 3, now stuck around 3.1 - watch  BIV CM  (R > L)==> fluid overload improved   Neurogenic bladder with chronic eubanks - good UO, cloudy but no UTI Sx  Renal sono 1/23 - Atrophic echogenic kidneys , w/o hydro  - avoid potential nephrotoxins  - cont diuretics --> expect azotemia  - follow labs  - likely will require RRT in future    Anemia :  - cont TAD and IV Fe  - target Hgb > 10.0    Dr Collado spoke with son earlier via telephone

## 2023-10-29 ENCOUNTER — TRANSCRIPTION ENCOUNTER (OUTPATIENT)
Age: 78
End: 2023-10-29

## 2023-10-29 LAB
ANION GAP SERPL CALC-SCNC: 13 MMOL/L — SIGNIFICANT CHANGE UP (ref 5–17)
ANION GAP SERPL CALC-SCNC: 13 MMOL/L — SIGNIFICANT CHANGE UP (ref 5–17)
BUN SERPL-MCNC: 101.2 MG/DL — HIGH (ref 8–20)
BUN SERPL-MCNC: 101.2 MG/DL — HIGH (ref 8–20)
CALCIUM SERPL-MCNC: 7.4 MG/DL — LOW (ref 8.4–10.5)
CALCIUM SERPL-MCNC: 7.4 MG/DL — LOW (ref 8.4–10.5)
CHLORIDE SERPL-SCNC: 101 MMOL/L — SIGNIFICANT CHANGE UP (ref 96–108)
CHLORIDE SERPL-SCNC: 101 MMOL/L — SIGNIFICANT CHANGE UP (ref 96–108)
CO2 SERPL-SCNC: 28 MMOL/L — SIGNIFICANT CHANGE UP (ref 22–29)
CO2 SERPL-SCNC: 28 MMOL/L — SIGNIFICANT CHANGE UP (ref 22–29)
CREAT SERPL-MCNC: 3.02 MG/DL — HIGH (ref 0.5–1.3)
CREAT SERPL-MCNC: 3.02 MG/DL — HIGH (ref 0.5–1.3)
EGFR: 21 ML/MIN/1.73M2 — LOW
EGFR: 21 ML/MIN/1.73M2 — LOW
GLUCOSE SERPL-MCNC: 130 MG/DL — HIGH (ref 70–99)
GLUCOSE SERPL-MCNC: 130 MG/DL — HIGH (ref 70–99)
HCT VFR BLD CALC: 33.2 % — LOW (ref 39–50)
HCT VFR BLD CALC: 33.2 % — LOW (ref 39–50)
HGB BLD-MCNC: 9.3 G/DL — LOW (ref 13–17)
HGB BLD-MCNC: 9.3 G/DL — LOW (ref 13–17)
MAGNESIUM SERPL-MCNC: 1.9 MG/DL — SIGNIFICANT CHANGE UP (ref 1.6–2.6)
MAGNESIUM SERPL-MCNC: 1.9 MG/DL — SIGNIFICANT CHANGE UP (ref 1.6–2.6)
MCHC RBC-ENTMCNC: 24.6 PG — LOW (ref 27–34)
MCHC RBC-ENTMCNC: 24.6 PG — LOW (ref 27–34)
MCHC RBC-ENTMCNC: 28 GM/DL — LOW (ref 32–36)
MCHC RBC-ENTMCNC: 28 GM/DL — LOW (ref 32–36)
MCV RBC AUTO: 87.8 FL — SIGNIFICANT CHANGE UP (ref 80–100)
MCV RBC AUTO: 87.8 FL — SIGNIFICANT CHANGE UP (ref 80–100)
PLATELET # BLD AUTO: 139 K/UL — LOW (ref 150–400)
PLATELET # BLD AUTO: 139 K/UL — LOW (ref 150–400)
POTASSIUM SERPL-MCNC: 3.3 MMOL/L — LOW (ref 3.5–5.3)
POTASSIUM SERPL-MCNC: 3.3 MMOL/L — LOW (ref 3.5–5.3)
POTASSIUM SERPL-SCNC: 3.3 MMOL/L — LOW (ref 3.5–5.3)
POTASSIUM SERPL-SCNC: 3.3 MMOL/L — LOW (ref 3.5–5.3)
RBC # BLD: 3.78 M/UL — LOW (ref 4.2–5.8)
RBC # BLD: 3.78 M/UL — LOW (ref 4.2–5.8)
RBC # FLD: 19.8 % — HIGH (ref 10.3–14.5)
RBC # FLD: 19.8 % — HIGH (ref 10.3–14.5)
SODIUM SERPL-SCNC: 142 MMOL/L — SIGNIFICANT CHANGE UP (ref 135–145)
SODIUM SERPL-SCNC: 142 MMOL/L — SIGNIFICANT CHANGE UP (ref 135–145)
WBC # BLD: 8.42 K/UL — SIGNIFICANT CHANGE UP (ref 3.8–10.5)
WBC # BLD: 8.42 K/UL — SIGNIFICANT CHANGE UP (ref 3.8–10.5)
WBC # FLD AUTO: 8.42 K/UL — SIGNIFICANT CHANGE UP (ref 3.8–10.5)
WBC # FLD AUTO: 8.42 K/UL — SIGNIFICANT CHANGE UP (ref 3.8–10.5)

## 2023-10-29 PROCEDURE — 99232 SBSQ HOSP IP/OBS MODERATE 35: CPT

## 2023-10-29 RX ORDER — METOPROLOL TARTRATE 50 MG
12.5 TABLET ORAL ONCE
Refills: 0 | Status: COMPLETED | OUTPATIENT
Start: 2023-10-29 | End: 2023-10-29

## 2023-10-29 RX ORDER — METOPROLOL TARTRATE 50 MG
2.5 TABLET ORAL ONCE
Refills: 0 | Status: DISCONTINUED | OUTPATIENT
Start: 2023-10-29 | End: 2023-10-29

## 2023-10-29 RX ORDER — POTASSIUM CHLORIDE 20 MEQ
40 PACKET (EA) ORAL
Refills: 0 | Status: COMPLETED | OUTPATIENT
Start: 2023-10-29 | End: 2023-10-29

## 2023-10-29 RX ORDER — SODIUM CHLORIDE 9 MG/ML
250 INJECTION INTRAMUSCULAR; INTRAVENOUS; SUBCUTANEOUS ONCE
Refills: 0 | Status: COMPLETED | OUTPATIENT
Start: 2023-10-29 | End: 2023-10-29

## 2023-10-29 RX ADMIN — Medication 20 MILLIGRAM(S): at 13:38

## 2023-10-29 RX ADMIN — SODIUM CHLORIDE 250 MILLILITER(S): 9 INJECTION INTRAMUSCULAR; INTRAVENOUS; SUBCUTANEOUS at 22:13

## 2023-10-29 RX ADMIN — Medication 50 MICROGRAM(S): at 07:01

## 2023-10-29 RX ADMIN — HEPARIN SODIUM 5000 UNIT(S): 5000 INJECTION INTRAVENOUS; SUBCUTANEOUS at 07:01

## 2023-10-29 RX ADMIN — Medication 1 TABLET(S): at 11:49

## 2023-10-29 RX ADMIN — Medication 20 MILLIGRAM(S): at 07:01

## 2023-10-29 RX ADMIN — Medication 12.5 MILLIGRAM(S): at 22:20

## 2023-10-29 RX ADMIN — HEPARIN SODIUM 5000 UNIT(S): 5000 INJECTION INTRAVENOUS; SUBCUTANEOUS at 21:31

## 2023-10-29 RX ADMIN — Medication 2 MILLIGRAM(S): at 14:01

## 2023-10-29 RX ADMIN — Medication 40 MILLIEQUIVALENT(S): at 11:49

## 2023-10-29 RX ADMIN — Medication 20 MILLIGRAM(S): at 21:40

## 2023-10-29 RX ADMIN — HEPARIN SODIUM 5000 UNIT(S): 5000 INJECTION INTRAVENOUS; SUBCUTANEOUS at 14:02

## 2023-10-29 RX ADMIN — Medication 40 MILLIEQUIVALENT(S): at 09:09

## 2023-10-29 RX ADMIN — ATORVASTATIN CALCIUM 20 MILLIGRAM(S): 80 TABLET, FILM COATED ORAL at 21:31

## 2023-10-29 RX ADMIN — Medication 1 APPLICATION(S): at 07:00

## 2023-10-29 RX ADMIN — BUMETANIDE 3 MILLIGRAM(S): 0.25 INJECTION INTRAMUSCULAR; INTRAVENOUS at 07:01

## 2023-10-29 RX ADMIN — Medication 1 MILLIGRAM(S): at 11:48

## 2023-10-29 RX ADMIN — PANTOPRAZOLE SODIUM 40 MILLIGRAM(S): 20 TABLET, DELAYED RELEASE ORAL at 07:01

## 2023-10-29 RX ADMIN — BUMETANIDE 3 MILLIGRAM(S): 0.25 INJECTION INTRAMUSCULAR; INTRAVENOUS at 14:00

## 2023-10-29 NOTE — PROGRESS NOTE ADULT - SUBJECTIVE AND OBJECTIVE BOX
[place] [facility] - [facility], THE HEART CENTER                                   [address] [place] [address]                                                      PHONE: [phone]                                                         FAX: [phone]  [email or web address]  ---------------------------------------------------------------------------------------------------------------------------------    Overnight events/patient complaints:  Had diarrhea last night, none this morning. Satting 93-94% on RA    No Known Drug Allergies  adhesives (Other)    MEDICATIONS  (STANDING):  atorvastatin 20 milliGRAM(s) Oral at bedtime  buMETAnide 3 milliGRAM(s) Oral two times a day  clotrimazole 1% Cream 1 Application(s) Topical two times a day  epoetin yuliana-epbx (RETACRIT) Injectable 36711 Unit(s) SubCutaneous every 7 days  folic acid 1 milliGRAM(s) Oral daily  heparin   Injectable 5000 Unit(s) SubCutaneous every 8 hours  influenza  Vaccine (HIGH DOSE) 0.7 milliLiter(s) IntraMuscular once  lactobacillus acidophilus 1 Tablet(s) Oral daily  levothyroxine 50 MICROGram(s) Oral daily  metolazone 5 milliGRAM(s) Oral daily  metoprolol succinate ER 75 milliGRAM(s) Oral daily  pantoprazole    Tablet 40 milliGRAM(s) Oral before breakfast  potassium chloride    Tablet ER 40 milliEquivalent(s) Oral every 2 hours  sildenafil (REVATIO) 20 milliGRAM(s) Oral three times a day  Tyvaso (Treprostinil) 16 MICROGram(s) 16 MICROGram(s) Inhalation four times a day    MEDICATIONS  (PRN):  acetaminophen     Tablet .. 650 milliGRAM(s) Oral every 6 hours PRN Temp greater or equal to 38C (100.4F), Mild Pain (1 - 3)  loperamide 2 milliGRAM(s) Oral four times a day PRN Diarrhea  melatonin 3 milliGRAM(s) Oral at bedtime PRN Insomnia  ondansetron Injectable 4 milliGRAM(s) IV Push every 8 hours PRN Nausea and/or Vomiting      Vital Signs Last 24 Hrs  T(C): 36.5 (29 Oct 2023 08:58), Max: 36.8 (28 Oct 2023 20:02)  T(F): 97.7 (29 Oct 2023 08:58), Max: 98.3 (28 Oct 2023 20:02)  HR: 90 (29 Oct 2023 08:58) (67 - 90)  BP: 101/65 (29 Oct 2023 08:58) (100/53 - 103/59)  BP(mean): --  RR: 18 (29 Oct 2023 08:58) (18 - 19)  SpO2: 96% (29 Oct 2023 08:58) (93% - 97%)    Parameters below as of 29 Oct 2023 05:21  Patient On (Oxygen Delivery Method): room air      ICU Vital Signs Last 24 Hrs  LETICIA MCCLURE  I&O's Detail    28 Oct 2023 07:01  -  29 Oct 2023 07:00  --------------------------------------------------------  IN:    Oral Fluid: 240 mL  Total IN: 240 mL    OUT:    Indwelling Catheter - Urethral (mL): 1470 mL  Total OUT: 1470 mL    Total NET: -1230 mL        I&O's Summary    28 Oct 2023 07:01  -  29 Oct 2023 07:00  --------------------------------------------------------  IN: 240 mL / OUT: 1470 mL / NET: -1230 mL      Drug Dosing Weight  LETICIA MCCLURE      PHYSICAL EXAM:  General: Appears comfortable, alert and cooperative.  HEENT: Normocephalic, atraumatic.  Eyes: Pupils reactive, cornea wnl.  Neck: Supple, no nodes adenopathy; no JVD, carotid bruit or thyromegaly.  CARDIOVASCULAR: Regular S1 S2 with no murmur, rub, gallop or lift.   LUNGS: No rales, rhonchi or wheeze. Normal breath sounds bilaterally.  ABDOMEN: Soft, nontender without mass or organomegaly. bowel sounds normoactive.  EXTREMITIES: No clubbing, cyanosis or edema. Distal pulses wnl.   SKIN: Warm and dry with normal turgor.  NEURO: Alert/oriented x 3/normal motor exam  PSYCH: Appropriate affect        LABS:                        9.3    8.42  )-----------( 139      ( 29 Oct 2023 04:58 )             33.2     10-29    142  |  101  |  101.2<H>  ----------------------------<  130<H>  3.3<L>   |  28.0  |  3.02<H>    Ca    7.4<L>      29 Oct 2023 04:58  Mg     1.9     10-29      LETICIA MCCLURE        Urinalysis Basic - ( 29 Oct 2023 04:58 )    Color: x / Appearance: x / SG: x / pH: x  Gluc: 130 mg/dL / Ketone: x  / Bili: x / Urobili: x   Blood: x / Protein: x / Nitrite: x   Leuk Esterase: x / RBC: x / WBC x   Sq Epi: x / Non Sq Epi: x / Bacteria: x      ASSESSMENT AND PLAN:  In summary, LETICIA MCCLURE is an 78y Male with HTN, HLD, neurogenic bladder, afib not on AC due to GI bleed, MV repair, JEANMARIE ligation, severe pulmonary HTN, TAVR, [place] Erick PPM, LVEF 50-55%, CKD p/w mechanical fall found to have worsening right heart failure s/p diuresis with improvement    - Saturation back at baseline  - Continue bumex 3 mg bid and metolazone 5 mg daily  - Replete K > 4. Otherwise stable from discharge from cardiac standpoint, will sign off and arrange for follow up as outpt

## 2023-10-29 NOTE — PROGRESS NOTE ADULT - ASSESSMENT
78M with a history of severe pulmonary hypertension, atrial fibrillation, neurogenic bladder, hypothyroidism, hypertension, and gastroesophageal reflux who presented after episodes of falling at home and was found to have hypoxia requiring initiation of supplemental oxygen. Bumetanide was initiated for diuresis with improvement in the the hypoxia. Renal function remained stable. He had episodes of diarrhea was continued on the course of metronidazole which was initiated as an outpatient.    #Acute hypoxic respiratory failure 2/2 Acute on chronic diastolic heart failure and Pulmonary hypertension  Continue on supplemental oxygen as need with titration as tolerated  Monitoring urine output and daily weights  Echocardiogram noted ejection fraction of 50 to 55%, moderately reduced right ventricular systolic function and moderate pulmonary hypertension  Changed to PO bumetanide and metolazone  HF recs birgit  Continue on sildenafil  c/w home home treprostinil - family brought in the pills    #Neurogenic bladder  Previously self-catheterizing but recently required Jain catheter  Draining clear urine  For further follow up with Urology on an outpatient basis    #Chronic kidney disease with acute kidney injury  Monitoring renal function on diuretic therapy  Stable  Nephrology following, may require renal replacement therapy in the future    #Anemia  On epoetin yuliana  Status post iron sucrose infusion    #Hypothyroidism  On levothyroxine    #Atrial fibrillation  On metoprolol  Not on anticoagulation at home    #Fall  Mechanical in nature  No loss of consciousness  Xray of the knee and hand were without fractures  Physical Therapy evaluation noted and thought to benefit from further treatment at a rehabilitation facility    #Bacteremia  Blood culture (10/21) grew Staph epidermidis in one bottle  Suspect contaminant  Remains afebrile and without leukocytosis    #Diarrhea  Appears improved  Afebrile and without leukocytosis  Abdominal examination benign  To complete the course of metronidazole which was initiated as outpatient  Loperamide as needed  Stop Mag oxide     DVT ppx: HSQ  Dispo: pending [facility]    Overall prognosis guarded

## 2023-10-29 NOTE — DISCHARGE NOTE PROVIDER - HOSPITAL COURSE
78M with a history of severe pulmonary hypertension, atrial fibrillation, neurogenic bladder, hypothyroidism, hypertension, and gastroesophageal reflux who presented after episodes of falling at home and was found to have hypoxia requiring initiation of supplemental oxygen. Seen by cards and heart failure and started on Bumetanide  IVfor diuresis with improvement in the hypoxia, now on PO diuretics. Echocardiogram noted ejection fraction of 50 to 55%, moderately reduced right ventricular systolic function and moderate pulmonary hypertension. Renal function remained stable. He had episodes of diarrhea was continued on the course of metronidazole which was initiated as an outpatient. Diarrhea also improved. Pt now optimized for discharge to [facility]. Pt also with Neurogenic bladder, previously self-catheterizing, but now s/p Jain placement, follow up with Urology on an outpatient basis.   Now stable for [facility].     High risk of readmission given multiple medical comorbidities.

## 2023-10-29 NOTE — PROGRESS NOTE ADULT - SUBJECTIVE AND OBJECTIVE BOX
Patient seen and examined    Feels fine, cheerful  Family present  no c/o CP SOB NV   No swelling feet    Vital Signs Last 24 Hrs  T(C): 36.8 (29 Oct 2023 17:18), Max: 36.8 (29 Oct 2023 17:18)  T(F): 98.3 (29 Oct 2023 17:18), Max: 98.3 (29 Oct 2023 17:18)  HR: 100 (29 Oct 2023 17:18) (67 - 100)  BP: 101/61 (29 Oct 2023 17:18) (100/53 - 106/61)  BP(mean): --  RR: 17 (29 Oct 2023 17:18) (17 - 18)  SpO2: 94% (29 Oct 2023 17:18) (93% - 96%)    Parameters below as of 29 Oct 2023 17:18  Patient On (Oxygen Delivery Method): nasal cannula        PHYSICAL EXAM    GENERAL: NAD,   EYES:  conjunctiva and sclera clear  NECK: Supple, No JVD/Bruit  NERVOUS SYSTEM:  A/O x3,   CHEST:  No rales, No rhonchi  HEART:  RRR, No murmur  ABDOMEN: Soft, NT/ND BS+  EXTREMITIES:  No Edema;  SKIN: No rashes    29 Oct 2023 04:58    142    |  101    |  101.2  ----------------------------<  130    3.3     |  28.0   |  3.02     Ca    7.4        29 Oct 2023 04:58  Mg     1.9       29 Oct 2023 04:58                            9.3    8.42  )-----------( 139      ( 29 Oct 2023 04:58 )             33.2         MEDICATIONS  (STANDING):  atorvastatin 20 milliGRAM(s) Oral at bedtime  buMETAnide 3 milliGRAM(s) Oral two times a day  clotrimazole 1% Cream 1 Application(s) Topical two times a day  epoetin yuliana-epbx (RETACRIT) Injectable 73772 Unit(s) SubCutaneous every 7 days  folic acid 1 milliGRAM(s) Oral daily  heparin   Injectable 5000 Unit(s) SubCutaneous every 8 hours  influenza  Vaccine (HIGH DOSE) 0.7 milliLiter(s) IntraMuscular once  lactobacillus acidophilus 1 Tablet(s) Oral daily  levothyroxine 50 MICROGram(s) Oral daily  metolazone 5 milliGRAM(s) Oral daily  metoprolol succinate ER 75 milliGRAM(s) Oral daily  pantoprazole    Tablet 40 milliGRAM(s) Oral before breakfast  sildenafil (REVATIO) 20 milliGRAM(s) Oral three times a day  Tyvaso (Treprostinil) 16 MICROGram(s) 16 MICROGram(s) Inhalation four times a day

## 2023-10-29 NOTE — DISCHARGE NOTE PROVIDER - NSDCCPCAREPLAN_GEN_ALL_CORE_FT
PRINCIPAL DISCHARGE DIAGNOSIS  Diagnosis: Acute on chronic respiratory failure with hypoxia  Assessment and Plan of Treatment:       SECONDARY DISCHARGE DIAGNOSES  Diagnosis: Pulmonary hypertension  Assessment and Plan of Treatment:     Diagnosis: Acute on chronic heart failure with preserved ejection fraction (HFpEF)  Assessment and Plan of Treatment:     Diagnosis: Acute kidney injury superimposed on CKD  Assessment and Plan of Treatment:     Diagnosis: Neurogenic bladder  Assessment and Plan of Treatment:

## 2023-10-29 NOTE — DISCHARGE NOTE PROVIDER - NSDCFUSCHEDAPPT_GEN_ALL_CORE_FT
[facility]  UROLOGY [address]  Scheduled Appointment: 11/10/2023    Rubio Sanchez  [facility]  UROLOGY [address]  Scheduled Appointment: 11/10/2023    Aron Rodriguez  [facility]  GASTRO [address]  Scheduled Appointment: 11/15/2023

## 2023-10-29 NOTE — PROGRESS NOTE ADULT - ASSESSMENT
78y Male with HTN, HLD, neurogenic bladder, afib not on AC due to GI bleed, MV repair, JEANMARIE ligation, severe pulmonary HTN, TAVR, [place] PPM, LVEF 50-55%, CKD p/w mechanical fall found to have worsening right heart failure s/p diuresis with improvement      CKD IV: Baseline creat 2.5 - 3, now stuck around 3.1 - watch  BIV CM  (R > L)==> fluid overload improved   Severe PAH - on Revatio and Tyvaso  Neurogenic bladder with chronic eubanks - good UO, cloudy but no UTI Sx  Renal sono 1/23 - Atrophic echogenic kidneys , w/o hydro  - avoid potential nephrotoxins  - cont diuretics but now can try stopping Metolazone if ok with Cardiology  - follow labs  - likely will require RRT in future    Anemia :  - cont TAD and IV Fe  - target Hgb > 10.0    Dr Collado spoke with son earlier via telephone

## 2023-10-29 NOTE — DISCHARGE NOTE PROVIDER - ATTENDING DISCHARGE PHYSICAL EXAMINATION:
VITALS:   T(C): 36.6 (10-30-23 @ 08:39), Max: 36.8 (10-29-23 @ 17:18)  HR: 84 (10-30-23 @ 08:39) (84 - 110)  BP: 102/64 (10-30-23 @ 08:39) (99/59 - 106/68)  RR: 18 (10-30-23 @ 08:39) (17 - 18)  SpO2: 95% (10-30-23 @ 08:39) (94% - 100%)    CONSTITUTIONAL: NAD, on NC  RESPIRATORY: Normal respiratory effort; course bs b/l  CARDIOVASCULAR: Regular rate and rhythm, normal S1 and S2, no LE edema  ABDOMEN: Nontender to palpation, normoactive bowel sounds  PSYCH: A+O x3; affect appropriate

## 2023-10-29 NOTE — DISCHARGE NOTE PROVIDER - NSDCMRMEDTOKEN_GEN_ALL_CORE_FT
allopurinol 100 mg oral tablet: 1 tab(s) orally once a day  bumetanide 1 mg oral tablet: 1 tab(s) orally 2 times a day  Kerendia 10 mg oral tablet: 1 tab(s) orally once a day  levothyroxine 50 mcg (0.05 mg) oral tablet: 1 tab(s) orally once a day  Lipitor 20 mg oral tablet: 1 tab(s) orally once a day  metOLazone 5 mg oral tablet: 1 tab(s) orally once a day  Metoprolol Succinate ER 25 mg oral tablet, extended release: 3 tab(s) orally once a day  potassium chloride 10 mEq oral capsule, extended release: 1 cap(s) orally 2 times a day  Protonix 40 mg oral delayed release tablet: 1 tab(s) orally once a day  sildenafil 20 mg oral tablet: 1 tab(s) orally 3 times a day  Tyvaso DPI Maintenance Kit 16 mcg inhalation powder: 1 each inhaled every 4 hours   allopurinol 100 mg oral tablet: 1 tab(s) orally once a day  bumetanide 1 mg oral tablet: 3 tab(s) orally 2 times a day  lactobacillus acidophilus oral capsule: 1 tab(s) orally once a day  levothyroxine 50 mcg (0.05 mg) oral tablet: 1 tab(s) orally once a day  Lipitor 20 mg oral tablet: 1 tab(s) orally once a day  loperamide 2 mg oral capsule: 1 cap(s) orally 4 times a day As needed Diarrhea  metOLazone 5 mg oral tablet: 1 tab(s) orally once a day  Metoprolol Succinate ER 25 mg oral tablet, extended release: 4 tab(s) orally once a day  Protonix 40 mg oral delayed release tablet: 1 tab(s) orally once a day  sildenafil 20 mg oral tablet: 1 tab(s) orally 3 times a day  Tyvaso DPI Maintenance Kit 16 mcg inhalation powder: 1 each inhaled every 4 hours

## 2023-10-29 NOTE — DISCHARGE NOTE PROVIDER - INSTRUCTIONS
Diet, DASH/TLC:   Sodium & Cholesterol Restricted  1500mL Fluid Restriction ([id number])     Special Instructions for Nursin milliLiter(s) to 2000 milliLiter(s) fluid restriction (10-21-23 @ 16:59) [Active]

## 2023-10-29 NOTE — PROGRESS NOTE ADULT - NUTRITIONAL ASSESSMENT
This patient has been assessed with a concern for Malnutrition and has been determined to have a diagnosis/diagnoses of Moderate protein-calorie malnutrition.    This patient is being managed with:   Diet DASH/TLC-  Sodium & Cholesterol Restricted  1500mL Fluid Restriction ([id number])     Special Instructions for Nursin milliLiter(s) to 2000 milliLiter(s) fluid restriction  Entered: Oct 21 2023  4:59PM  
This patient has been assessed with a concern for Malnutrition and has been determined to have a diagnosis/diagnoses of Moderate protein-calorie malnutrition.    This patient is being managed with:   Diet DASH/TLC-  Sodium & Cholesterol Restricted  1500mL Fluid Restriction ([id number])     Special Instructions for Nursin milliLiter(s) to 2000 milliLiter(s) fluid restriction  Entered: Oct 21 2023  4:59PM

## 2023-10-29 NOTE — PROGRESS NOTE ADULT - SUBJECTIVE AND OBJECTIVE BOX
Chris Herrera M.D.    Patient is a 78y old  Male who presents with a chief complaint of Hypoxic respiratory failure (28 Oct 2023 13:17)      SUBJECTIVE / OVERNIGHT EVENTS: loose BM overnight. No other concerns.    Patient denies chest pain, SOB, abd pain, N/V, fever, chills, dysuria or any other complaints. All remainder ROS negative.     MEDICATIONS  (STANDING):  atorvastatin 20 milliGRAM(s) Oral at bedtime  buMETAnide 3 milliGRAM(s) Oral two times a day  clotrimazole 1% Cream 1 Application(s) Topical two times a day  epoetin yuliana-epbx (RETACRIT) Injectable 52840 Unit(s) SubCutaneous every 7 days  folic acid 1 milliGRAM(s) Oral daily  heparin   Injectable 5000 Unit(s) SubCutaneous every 8 hours  influenza  Vaccine (HIGH DOSE) 0.7 milliLiter(s) IntraMuscular once  lactobacillus acidophilus 1 Tablet(s) Oral daily  levothyroxine 50 MICROGram(s) Oral daily  metolazone 5 milliGRAM(s) Oral daily  metoprolol succinate ER 75 milliGRAM(s) Oral daily  pantoprazole    Tablet 40 milliGRAM(s) Oral before breakfast  potassium chloride    Tablet ER 40 milliEquivalent(s) Oral every 2 hours  sildenafil (REVATIO) 20 milliGRAM(s) Oral three times a day  Tyvaso (Treprostinil) 16 MICROGram(s) 16 MICROGram(s) Inhalation four times a day    MEDICATIONS  (PRN):  acetaminophen     Tablet .. 650 milliGRAM(s) Oral every 6 hours PRN Temp greater or equal to 38C (100.4F), Mild Pain (1 - 3)  loperamide 2 milliGRAM(s) Oral four times a day PRN Diarrhea  melatonin 3 milliGRAM(s) Oral at bedtime PRN Insomnia  ondansetron Injectable 4 milliGRAM(s) IV Push every 8 hours PRN Nausea and/or Vomiting      I&O's Summary    28 Oct 2023 07:01  -  29 Oct 2023 07:00  --------------------------------------------------------  IN: 240 mL / OUT: 1470 mL / NET: -1230 mL        PHYSICAL EXAM:  Vital Signs Last 24 Hrs  T(C): 36.5 (29 Oct 2023 08:58), Max: 36.8 (28 Oct 2023 20:02)  T(F): 97.7 (29 Oct 2023 08:58), Max: 98.3 (28 Oct 2023 20:02)  HR: 90 (29 Oct 2023 08:58) (67 - 90)  BP: 101/65 (29 Oct 2023 08:58) (100/53 - 103/59)  BP(mean): --  RR: 18 (29 Oct 2023 08:58) (18 - 19)  SpO2: 96% (29 Oct 2023 08:58) (93% - 97%)    Parameters below as of 29 Oct 2023 05:21  Patient On (Oxygen Delivery Method): room air      CONSTITUTIONAL: NAD, on NC  RESPIRATORY: Normal respiratory effort; course bs b/l  CARDIOVASCULAR: Regular rate and rhythm, normal S1 and S2, no LE edema  ABDOMEN: Nontender to palpation, normoactive bowel sounds  PSYCH: A+O x3; affect appropriate      LABS:                        9.3    8.42  )-----------( 139      ( 29 Oct 2023 04:58 )             33.2     10-29    142  |  101  |  101.2<H>  ----------------------------<  130<H>  3.3<L>   |  28.0  |  3.02<H>    Ca    7.4<L>      29 Oct 2023 04:58  Mg     1.9     10-29        Urinalysis Basic - ( 29 Oct 2023 04:58 )    Color: x / Appearance: x / SG: x / pH: x  Gluc: 130 mg/dL / Ketone: x  / Bili: x / Urobili: x   Blood: x / Protein: x / Nitrite: x   Leuk Esterase: x / RBC: x / WBC x   Sq Epi: x / Non Sq Epi: x / Bacteria: x        CAPILLARY BLOOD GLUCOSE          RADIOLOGY & ADDITIONAL TESTS:  Results Reviewed:   Imaging Personally Reviewed:  Electrocardiogram Personally Reviewed:

## 2023-10-29 NOTE — DISCHARGE NOTE PROVIDER - DETAILS OF MALNUTRITION DIAGNOSIS/DIAGNOSES
This patient has been assessed with a concern for Malnutrition and was treated during this hospitalization for the following Nutrition diagnosis/diagnoses:     -  10/27/2023: Moderate protein-calorie malnutrition

## 2023-10-30 ENCOUNTER — TRANSCRIPTION ENCOUNTER (OUTPATIENT)
Age: 78
End: 2023-10-30

## 2023-10-30 VITALS
OXYGEN SATURATION: 92 % | SYSTOLIC BLOOD PRESSURE: 110 MMHG | HEART RATE: 93 BPM | DIASTOLIC BLOOD PRESSURE: 67 MMHG | TEMPERATURE: 98 F | RESPIRATION RATE: 18 BRPM

## 2023-10-30 LAB
ANION GAP SERPL CALC-SCNC: 12 MMOL/L — SIGNIFICANT CHANGE UP (ref 5–17)
ANION GAP SERPL CALC-SCNC: 12 MMOL/L — SIGNIFICANT CHANGE UP (ref 5–17)
BUN SERPL-MCNC: 98.3 MG/DL — HIGH (ref 8–20)
BUN SERPL-MCNC: 98.3 MG/DL — HIGH (ref 8–20)
CALCIUM SERPL-MCNC: 7.5 MG/DL — LOW (ref 8.4–10.5)
CALCIUM SERPL-MCNC: 7.5 MG/DL — LOW (ref 8.4–10.5)
CHLORIDE SERPL-SCNC: 103 MMOL/L — SIGNIFICANT CHANGE UP (ref 96–108)
CHLORIDE SERPL-SCNC: 103 MMOL/L — SIGNIFICANT CHANGE UP (ref 96–108)
CO2 SERPL-SCNC: 29 MMOL/L — SIGNIFICANT CHANGE UP (ref 22–29)
CO2 SERPL-SCNC: 29 MMOL/L — SIGNIFICANT CHANGE UP (ref 22–29)
CREAT SERPL-MCNC: 2.81 MG/DL — HIGH (ref 0.5–1.3)
CREAT SERPL-MCNC: 2.81 MG/DL — HIGH (ref 0.5–1.3)
EGFR: 22 ML/MIN/1.73M2 — LOW
EGFR: 22 ML/MIN/1.73M2 — LOW
GLUCOSE SERPL-MCNC: 112 MG/DL — HIGH (ref 70–99)
GLUCOSE SERPL-MCNC: 112 MG/DL — HIGH (ref 70–99)
MAGNESIUM SERPL-MCNC: 1.8 MG/DL — SIGNIFICANT CHANGE UP (ref 1.6–2.6)
MAGNESIUM SERPL-MCNC: 1.8 MG/DL — SIGNIFICANT CHANGE UP (ref 1.6–2.6)
PHOSPHATE SERPL-MCNC: 3.5 MG/DL — SIGNIFICANT CHANGE UP (ref 2.4–4.7)
PHOSPHATE SERPL-MCNC: 3.5 MG/DL — SIGNIFICANT CHANGE UP (ref 2.4–4.7)
POTASSIUM SERPL-MCNC: 3.8 MMOL/L — SIGNIFICANT CHANGE UP (ref 3.5–5.3)
POTASSIUM SERPL-MCNC: 3.8 MMOL/L — SIGNIFICANT CHANGE UP (ref 3.5–5.3)
POTASSIUM SERPL-SCNC: 3.8 MMOL/L — SIGNIFICANT CHANGE UP (ref 3.5–5.3)
POTASSIUM SERPL-SCNC: 3.8 MMOL/L — SIGNIFICANT CHANGE UP (ref 3.5–5.3)
SARS-COV-2 RNA SPEC QL NAA+PROBE: SIGNIFICANT CHANGE UP
SARS-COV-2 RNA SPEC QL NAA+PROBE: SIGNIFICANT CHANGE UP
SODIUM SERPL-SCNC: 144 MMOL/L — SIGNIFICANT CHANGE UP (ref 135–145)
SODIUM SERPL-SCNC: 144 MMOL/L — SIGNIFICANT CHANGE UP (ref 135–145)

## 2023-10-30 PROCEDURE — 99239 HOSP IP/OBS DSCHRG MGMT >30: CPT

## 2023-10-30 RX ORDER — POTASSIUM CHLORIDE 20 MEQ
1 PACKET (EA) ORAL
Qty: 0 | Refills: 0 | DISCHARGE

## 2023-10-30 RX ORDER — METOPROLOL TARTRATE 50 MG
4 TABLET ORAL
Qty: 0 | Refills: 0 | DISCHARGE

## 2023-10-30 RX ORDER — METOPROLOL TARTRATE 50 MG
100 TABLET ORAL DAILY
Refills: 0 | Status: DISCONTINUED | OUTPATIENT
Start: 2023-10-30 | End: 2023-10-30

## 2023-10-30 RX ORDER — LACTOBACILLUS ACIDOPHILUS 100MM CELL
1 CAPSULE ORAL
Qty: 0 | Refills: 0 | DISCHARGE
Start: 2023-10-30

## 2023-10-30 RX ORDER — FINERENONE 20 MG/1
1 TABLET, FILM COATED ORAL
Qty: 0 | Refills: 0 | DISCHARGE

## 2023-10-30 RX ORDER — LOPERAMIDE HCL 2 MG
1 TABLET ORAL
Qty: 0 | Refills: 0 | DISCHARGE
Start: 2023-10-30

## 2023-10-30 RX ADMIN — Medication 20 MILLIGRAM(S): at 05:07

## 2023-10-30 RX ADMIN — Medication 50 MICROGRAM(S): at 05:07

## 2023-10-30 RX ADMIN — Medication 1 TABLET(S): at 11:24

## 2023-10-30 RX ADMIN — HEPARIN SODIUM 5000 UNIT(S): 5000 INJECTION INTRAVENOUS; SUBCUTANEOUS at 05:07

## 2023-10-30 RX ADMIN — BUMETANIDE 3 MILLIGRAM(S): 0.25 INJECTION INTRAMUSCULAR; INTRAVENOUS at 05:07

## 2023-10-30 RX ADMIN — Medication 75 MILLIGRAM(S): at 05:07

## 2023-10-30 RX ADMIN — Medication 1 APPLICATION(S): at 05:08

## 2023-10-30 RX ADMIN — BUMETANIDE 3 MILLIGRAM(S): 0.25 INJECTION INTRAMUSCULAR; INTRAVENOUS at 11:24

## 2023-10-30 RX ADMIN — PANTOPRAZOLE SODIUM 40 MILLIGRAM(S): 20 TABLET, DELAYED RELEASE ORAL at 05:09

## 2023-10-30 RX ADMIN — Medication 1 MILLIGRAM(S): at 11:24

## 2023-10-30 RX ADMIN — Medication 20 MILLIGRAM(S): at 11:24

## 2023-10-30 RX ADMIN — HEPARIN SODIUM 5000 UNIT(S): 5000 INJECTION INTRAVENOUS; SUBCUTANEOUS at 11:24

## 2023-10-30 NOTE — PROGRESS NOTE ADULT - ASSESSMENT
78y Male with HTN, HLD, neurogenic bladder, afib not on AC due to GI bleed, MV repair, JEANMARIE ligation, severe pulmonary HTN, TAVR, [place] PPM, LVEF 50-55%, CKD p/w mechanical fall found to have worsening right heart failure s/p diuresis with improvement      CKD IV: Baseline creat 2.5 - 3, now stuck around 3.1 >2.81 improving -- watch  BIV CM  (R > L)==> fluid overload improved   Severe PAH - on Revatio and Tyvaso  Neurogenic bladder with chronic eubanks - good UO, cloudy but no UTI Sx  Renal sono 1/23 - Atrophic echogenic kidneys , w/o hydro  - avoid potential nephrotoxins  - cont diuretics but now can try stopping Metolazone if ok with Cardiology  - follow labs    Anemia :  - cont TAD and IV Fe  - target Hgb > 10.0; 9.3 now

## 2023-10-30 NOTE — PROGRESS NOTE ADULT - REASON FOR ADMISSION
Hypoxic respiratory failure

## 2023-10-30 NOTE — PROGRESS NOTE ADULT - PROVIDER SPECIALTY LIST ADULT
Cardiology
Heart Failure
Nephrology
Cardiology
Cardiology
Hospitalist
Nephrology
Cardiology
Cardiology
Hospitalist
Nephrology
Heart Failure
Heart Failure

## 2023-10-30 NOTE — DISCHARGE NOTE NURSING/CASE MANAGEMENT/SOCIAL WORK - PATIENT PORTAL LINK FT
You can access the FollowMyHealth Patient Portal offered by [facility] by registering at the following website: [email or web address]. By joining [employer]’s FollowMyHealth portal, you will also be able to view your health information using other applications (apps) compatible with our system.

## 2023-10-30 NOTE — PROGRESS NOTE ADULT - SUBJECTIVE AND OBJECTIVE BOX
Patient seen and examined    Feels fine  no c/o CP SOB NV   No swelling feet  Ate well    Vital Signs Last 24 Hrs  T(C): 36.6 (30 Oct 2023 08:39), Max: 36.8 (29 Oct 2023 17:18)  T(F): 97.9 (30 Oct 2023 08:39), Max: 98.3 (29 Oct 2023 17:18)  HR: 84 (30 Oct 2023 08:39) (84 - 110)  BP: 102/64 (30 Oct 2023 08:39) (99/59 - 106/68)  BP(mean): --  RR: 18 (30 Oct 2023 08:39) (17 - 18)  SpO2: 95% (30 Oct 2023 08:39) (94% - 100%)    Parameters below as of 30 Oct 2023 08:39  Patient On (Oxygen Delivery Method): nasal cannula  O2 Flow (L/min): 1.5      PHYSICAL EXAM    GENERAL: NAD,   EYES:  conjunctiva and sclera clear  NECK: Supple, No JVD/Bruit  NERVOUS SYSTEM:  A/O x3,   CHEST:  No rales, No rhonchi  HEART:  RRR, No murmur  ABDOMEN: Soft, Obese, NT/ND BS+  EXTREMITIES:  No Edema;  SKIN: No rashes  Indwelling eubanks draining    30 Oct 2023 05:33    144    |  103    |  98.3   ----------------------------<  112    3.8     |  29.0   |  2.81     Ca    7.5        30 Oct 2023 05:33  Phos  3.5       30 Oct 2023 05:33  Mg     1.8       30 Oct 2023 05:33                            9.3    8.42  )-----------( 139      ( 29 Oct 2023 04:58 )             33.2       MEDICATIONS  (STANDING):  atorvastatin 20 milliGRAM(s) Oral at bedtime  buMETAnide 3 milliGRAM(s) Oral two times a day  clotrimazole 1% Cream 1 Application(s) Topical two times a day  epoetin yuliana-epbx (RETACRIT) Injectable 21325 Unit(s) SubCutaneous every 7 days  folic acid 1 milliGRAM(s) Oral daily  heparin   Injectable 5000 Unit(s) SubCutaneous every 8 hours  influenza  Vaccine (HIGH DOSE) 0.7 milliLiter(s) IntraMuscular once  lactobacillus acidophilus 1 Tablet(s) Oral daily  levothyroxine 50 MICROGram(s) Oral daily  metolazone 5 milliGRAM(s) Oral daily  metoprolol succinate  milliGRAM(s) Oral daily  pantoprazole    Tablet 40 milliGRAM(s) Oral before breakfast  sildenafil (REVATIO) 20 milliGRAM(s) Oral three times a day  Tyvaso (Treprostinil) 16 MICROGram(s) 16 MICROGram(s) Inhalation four times a day

## 2023-11-06 ENCOUNTER — TRANSCRIPTION ENCOUNTER (OUTPATIENT)
Age: 78
End: 2023-11-06

## 2023-11-10 ENCOUNTER — APPOINTMENT (OUTPATIENT)
Dept: UROLOGY | Facility: CLINIC | Age: 78
End: 2023-11-10
Payer: MEDICARE

## 2023-11-10 VITALS
HEART RATE: 61 BPM | WEIGHT: 225 LBS | DIASTOLIC BLOOD PRESSURE: 73 MMHG | SYSTOLIC BLOOD PRESSURE: 108 MMHG | HEIGHT: 70 IN | TEMPERATURE: 96.7 F | OXYGEN SATURATION: 97 % | BODY MASS INDEX: 32.21 KG/M2

## 2023-11-10 PROCEDURE — A4216: CPT | Mod: NC

## 2023-11-10 PROCEDURE — 51702 INSERT TEMP BLADDER CATH: CPT

## 2023-11-13 ENCOUNTER — TRANSCRIPTION ENCOUNTER (OUTPATIENT)
Age: 78
End: 2023-11-13

## 2023-11-13 PROCEDURE — 80061 LIPID PANEL: CPT

## 2023-11-13 PROCEDURE — 82728 ASSAY OF FERRITIN: CPT

## 2023-11-13 PROCEDURE — 85610 PROTHROMBIN TIME: CPT

## 2023-11-13 PROCEDURE — 84466 ASSAY OF TRANSFERRIN: CPT

## 2023-11-13 PROCEDURE — 82553 CREATINE MB FRACTION: CPT

## 2023-11-13 PROCEDURE — 83735 ASSAY OF MAGNESIUM: CPT

## 2023-11-13 PROCEDURE — 83036 HEMOGLOBIN GLYCOSYLATED A1C: CPT

## 2023-11-13 PROCEDURE — 82550 ASSAY OF CK (CPK): CPT

## 2023-11-13 PROCEDURE — 87150 DNA/RNA AMPLIFIED PROBE: CPT

## 2023-11-13 PROCEDURE — 83550 IRON BINDING TEST: CPT

## 2023-11-13 PROCEDURE — 84100 ASSAY OF PHOSPHORUS: CPT

## 2023-11-13 PROCEDURE — 87635 SARS-COV-2 COVID-19 AMP PRB: CPT

## 2023-11-13 PROCEDURE — 85025 COMPLETE CBC W/AUTO DIFF WBC: CPT

## 2023-11-13 PROCEDURE — 97116 GAIT TRAINING THERAPY: CPT

## 2023-11-13 PROCEDURE — 84484 ASSAY OF TROPONIN QUANT: CPT

## 2023-11-13 PROCEDURE — 73562 X-RAY EXAM OF KNEE 3: CPT

## 2023-11-13 PROCEDURE — 93005 ELECTROCARDIOGRAM TRACING: CPT

## 2023-11-13 PROCEDURE — 80048 BASIC METABOLIC PNL TOTAL CA: CPT

## 2023-11-13 PROCEDURE — 84443 ASSAY THYROID STIM HORMONE: CPT

## 2023-11-13 PROCEDURE — 84439 ASSAY OF FREE THYROXINE: CPT

## 2023-11-13 PROCEDURE — 76775 US EXAM ABDO BACK WALL LIM: CPT

## 2023-11-13 PROCEDURE — 71045 X-RAY EXAM CHEST 1 VIEW: CPT

## 2023-11-13 PROCEDURE — 87040 BLOOD CULTURE FOR BACTERIA: CPT

## 2023-11-13 PROCEDURE — 83880 ASSAY OF NATRIURETIC PEPTIDE: CPT

## 2023-11-13 PROCEDURE — 80053 COMPREHEN METABOLIC PANEL: CPT

## 2023-11-13 PROCEDURE — 84156 ASSAY OF PROTEIN URINE: CPT

## 2023-11-13 PROCEDURE — 82570 ASSAY OF URINE CREATININE: CPT

## 2023-11-13 PROCEDURE — 87077 CULTURE AEROBIC IDENTIFY: CPT

## 2023-11-13 PROCEDURE — 85027 COMPLETE CBC AUTOMATED: CPT

## 2023-11-13 PROCEDURE — 81001 URINALYSIS AUTO W/SCOPE: CPT

## 2023-11-13 PROCEDURE — 99291 CRITICAL CARE FIRST HOUR: CPT | Mod: 25

## 2023-11-13 PROCEDURE — 97110 THERAPEUTIC EXERCISES: CPT

## 2023-11-13 PROCEDURE — C8929: CPT

## 2023-11-13 PROCEDURE — 83540 ASSAY OF IRON: CPT

## 2023-11-13 PROCEDURE — P9047: CPT

## 2023-11-13 PROCEDURE — 36415 COLL VENOUS BLD VENIPUNCTURE: CPT

## 2023-11-13 PROCEDURE — 73130 X-RAY EXAM OF HAND: CPT

## 2023-11-13 PROCEDURE — 85730 THROMBOPLASTIN TIME PARTIAL: CPT

## 2023-11-29 ENCOUNTER — TRANSCRIPTION ENCOUNTER (OUTPATIENT)
Age: 78
End: 2023-11-29

## 2023-12-08 ENCOUNTER — APPOINTMENT (OUTPATIENT)
Dept: UROLOGY | Facility: CLINIC | Age: 78
End: 2023-12-08
Payer: MEDICARE

## 2023-12-08 VITALS
TEMPERATURE: 97.3 F | HEART RATE: 55 BPM | SYSTOLIC BLOOD PRESSURE: 99 MMHG | HEIGHT: 70 IN | WEIGHT: 225 LBS | BODY MASS INDEX: 32.21 KG/M2 | DIASTOLIC BLOOD PRESSURE: 63 MMHG

## 2023-12-08 PROCEDURE — 51701 INSERT BLADDER CATHETER: CPT

## 2024-02-08 ENCOUNTER — APPOINTMENT (OUTPATIENT)
Dept: GASTROENTEROLOGY | Facility: CLINIC | Age: 79
End: 2024-02-08

## 2024-02-13 NOTE — PATIENT PROFILE ADULT - HOW PATIENT ADDRESSED, PROFILE
Use either Ibuprofen or Acetaminophen for pain or fever relief:       Ibuprofen (Advil, Motrin) 100 mg/5ml 8.5  ml every 6 hours as needed.      And/or    Acetaminophen (Tylenol) 160 mg/5ml 8.5  ml (infant and childrens is the SAME concentration) every 4 hours.       If your child still has fever or pain 1 hour after dosing ibuprofen you can give an additional dose of acetaminophen to achive more relief.      Make sure to note the times the ibuprofen and acetaminophen is given because it can get very confusing, there should always be 6 hours between doses of ibuprofen and 4 hours between dosing of acetminophen.     Benedryl--   5 to 7.5ml at bedtime as needed for cold symptoms or sleep.     Run the humidifier at night or when napping to loosen any mucous.     Saline in the nose can help immensely-- you can do this as often as needed. You can turn the saline bottle upside down and squeeze a stream of saline on 1 side of the nose. Let this clear and then do the other side of the nose. The excess will just drain down the throat. Use the bulb suction to capture any loosened mucous.  Use saline before naps and bedtimes or after sleeping as well, as needed.     Elevate the head of bed if needed by placing something UNDER the mattress. This will at least allow the baby to fall asleep but they may not stay there all night.     In the morning or after naps, you can offer a little water first before a bottle to allow the mucous to loosen before offering a bottle.     Call if getting worse, sicker, eating/drinking poorly, increasing irritability, or if worried. Most cough colds can take up to 2 weeks to slowly improve.         Braxton

## 2024-03-18 ENCOUNTER — APPOINTMENT (OUTPATIENT)
Dept: UROLOGY | Facility: CLINIC | Age: 79
End: 2024-03-18

## 2024-03-30 ENCOUNTER — RESULT REVIEW (OUTPATIENT)
Age: 79
End: 2024-03-30

## 2024-03-31 ENCOUNTER — TRANSCRIPTION ENCOUNTER (OUTPATIENT)
Age: 79
End: 2024-03-31

## 2024-04-11 ENCOUNTER — APPOINTMENT (OUTPATIENT)
Dept: UROLOGY | Facility: CLINIC | Age: 79
End: 2024-04-11
Payer: MEDICARE

## 2024-04-11 VITALS
WEIGHT: 170 LBS | HEIGHT: 70 IN | DIASTOLIC BLOOD PRESSURE: 66 MMHG | HEART RATE: 70 BPM | SYSTOLIC BLOOD PRESSURE: 110 MMHG | BODY MASS INDEX: 24.34 KG/M2 | OXYGEN SATURATION: 99 % | TEMPERATURE: 97.6 F

## 2024-04-11 DIAGNOSIS — N99.115 POSTPROCEDURAL FOSSA NAVICULARIS URETHRAL STRICTURE: ICD-10-CM

## 2024-04-11 PROCEDURE — 99213 OFFICE O/P EST LOW 20 MIN: CPT

## 2024-04-11 NOTE — ASSESSMENT
[FreeTextEntry1] : Patient is comfortable with the Jain catheter. Patient has a lot of comorbidities. We discussed the plan to continue with the Jain catheter. We change the Jain catheter today.

## 2024-04-11 NOTE — HISTORY OF PRESENT ILLNESS
[FreeTextEntry1] : Patient just recently was discharged from the [facility]. Patient has a lot of issues. From the urological point he suffers from the severe urethral stricture.  He was on the clean intermittent catheterization. In [facility] the catheter was inserted. Patient is very comfortable with the catheter

## 2024-04-11 NOTE — PHYSICAL EXAM
[Normal Appearance] : normal appearance [Well Groomed] : well groomed [General Appearance - In No Acute Distress] : no acute distress [Edema] : no peripheral edema [Respiration, Rhythm And Depth] : normal respiratory rhythm and effort [Exaggerated Use Of Accessory Muscles For Inspiration] : no accessory muscle use [Abdomen Soft] : soft [Costovertebral Angle Tenderness] : no ~M costovertebral angle tenderness [Abdomen Tenderness] : non-tender [Urinary Bladder Findings] : the bladder was normal on palpation [] : no rash [No Focal Deficits] : no focal deficits [Oriented To Time, Place, And Person] : oriented to person, place, and time [Affect] : the affect was normal [Mood] : the mood was normal [No Palpable Adenopathy] : no palpable adenopathy [de-identified] : Ajin catheter is present [de-identified] : Patient is in the wheelchair

## 2024-04-16 ENCOUNTER — OUTPATIENT (OUTPATIENT)
Dept: OUTPATIENT SERVICES | Facility: HOSPITAL | Age: 79
LOS: 1 days | End: 2024-04-16

## 2024-04-16 DIAGNOSIS — Z95.2 PRESENCE OF PROSTHETIC HEART VALVE: Chronic | ICD-10-CM

## 2024-04-16 DIAGNOSIS — R91.1 SOLITARY PULMONARY NODULE: ICD-10-CM

## 2024-04-16 DIAGNOSIS — Z98.890 OTHER SPECIFIED POSTPROCEDURAL STATES: Chronic | ICD-10-CM

## 2024-04-16 DIAGNOSIS — Z90.49 ACQUIRED ABSENCE OF OTHER SPECIFIED PARTS OF DIGESTIVE TRACT: Chronic | ICD-10-CM

## 2024-04-16 DIAGNOSIS — Z95.0 PRESENCE OF CARDIAC PACEMAKER: Chronic | ICD-10-CM

## 2024-04-19 ENCOUNTER — NON-APPOINTMENT (OUTPATIENT)
Age: 79
End: 2024-04-19

## 2024-04-19 ENCOUNTER — APPOINTMENT (OUTPATIENT)
Age: 79
End: 2024-04-19
Payer: MEDICARE

## 2024-04-19 ENCOUNTER — APPOINTMENT (OUTPATIENT)
Dept: GASTROENTEROLOGY | Facility: CLINIC | Age: 79
End: 2024-04-19

## 2024-04-19 VITALS
WEIGHT: 175 LBS | HEIGHT: 70 IN | BODY MASS INDEX: 25.05 KG/M2 | SYSTOLIC BLOOD PRESSURE: 117 MMHG | OXYGEN SATURATION: 98 % | DIASTOLIC BLOOD PRESSURE: 70 MMHG | TEMPERATURE: 97.9 F | HEART RATE: 69 BPM

## 2024-04-19 PROCEDURE — 99204 OFFICE O/P NEW MOD 45 MIN: CPT

## 2024-05-23 ENCOUNTER — APPOINTMENT (OUTPATIENT)
Dept: UROLOGY | Facility: CLINIC | Age: 79
End: 2024-05-23
Payer: MEDICARE

## 2024-05-23 VITALS
HEIGHT: 70 IN | HEART RATE: 69 BPM | WEIGHT: 180 LBS | SYSTOLIC BLOOD PRESSURE: 107 MMHG | DIASTOLIC BLOOD PRESSURE: 57 MMHG | BODY MASS INDEX: 25.77 KG/M2 | OXYGEN SATURATION: 95 %

## 2024-05-23 DIAGNOSIS — R33.9 RETENTION OF URINE, UNSPECIFIED: ICD-10-CM

## 2024-05-23 DIAGNOSIS — N35.919 UNSPECIFIED URETHRAL STRICTURE, MALE, UNSPECIFIED SITE: ICD-10-CM

## 2024-05-23 PROCEDURE — 99213 OFFICE O/P EST LOW 20 MIN: CPT

## 2024-05-23 RX ORDER — ALLOPURINOL 100 MG/1
100 TABLET ORAL
Qty: 90 | Refills: 0 | Status: DISCONTINUED | COMMUNITY
Start: 2018-04-20 | End: 2024-05-23

## 2024-05-23 NOTE — HISTORY OF PRESENT ILLNESS
[FreeTextEntry1] : Patient presented today to discuss the plan of the following management From the urological point he suffers from the severe urethral stricture and was on the clean intermittent catheterization. In [facility] Jain catheter was inserted. Patient is very comfortable with the catheter

## 2024-05-23 NOTE — ASSESSMENT
[FreeTextEntry1] : I explained to the patient that is very hard to prevent the urinary retention in presence of Jain catheter. I also explained to the patient that the urinalysis and urine culture could not help to prevent the infection because more than 70% of the patients with the Jain catheter will have positive urine culture and every time it can be different bacteria. This is why the treatment of asymptomatic bacteriuria is not recommended. We also decided to change the Jain catheter and continue to change Jain catheter every 4 weeks.

## 2024-05-23 NOTE — PHYSICAL EXAM
[Normal Appearance] : normal appearance [Well Groomed] : well groomed [General Appearance - In No Acute Distress] : no acute distress [Edema] : no peripheral edema [Respiration, Rhythm And Depth] : normal respiratory rhythm and effort [Exaggerated Use Of Accessory Muscles For Inspiration] : no accessory muscle use [Abdomen Soft] : soft [Abdomen Tenderness] : non-tender [Costovertebral Angle Tenderness] : no ~M costovertebral angle tenderness [Urinary Bladder Findings] : the bladder was normal on palpation [] : no rash [No Focal Deficits] : no focal deficits [Oriented To Time, Place, And Person] : oriented to person, place, and time [Affect] : the affect was normal [Mood] : the mood was normal [No Palpable Adenopathy] : no palpable adenopathy [de-identified] : Jain catheter is present [de-identified] : Patient is in the wheelchair

## 2024-06-10 NOTE — END OF VISIT
[FreeTextEntry3] : All medical record entries made by the Scribe were at my, Dr. Candido Lomeli, direction and personally dictated by me on 04/19/2024. I have reviewed the chart and agree that the record accurately reflects my personal performance of the history, physical exam, assessment and plan. I have also personally directed, reviewed, and agreed with the chart. [Time Spent: ___ minutes] : I have spent [unfilled] minutes of time on the encounter.

## 2024-06-10 NOTE — ADDENDUM
[FreeTextEntry1] :  I, Marvin Moncada, documented this note as a scribe on behalf of Dr. Candido Lomeli on 04/19/2024.

## 2024-06-10 NOTE — REVIEW OF SYSTEMS
[Chest Pain] : no chest pain [Shortness Of Breath] : no shortness of breath [Abdominal Pain] : no abdominal pain [Easy Bleeding] : no tendency for easy bleeding [Easy Bruising] : no tendency for easy bruising

## 2024-06-10 NOTE — PHYSICAL EXAM
[Normal] : grossly intact [de-identified] : anicteric [de-identified] : decreased air entry diffusely

## 2024-06-10 NOTE — HISTORY OF PRESENT ILLNESS
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 1.9), and pHTN ([facility] 9/2023 w/ mPAP 36, PCWP 11 on Tyvaso/Sildenafil) c/b chronic hypoxemic respiratory failure, recently admitted with Klebsiella UTI and severe TIERRA on CKD w/ c/f uremic encephalopathy, worsening anemia.  Escalated to MICU for CRRT, c/b recurrent AMS with VT preceding seizure activity.  At baseline, independent in ADLs. Ambulates with a cane. Lives at home alone with home attendants who come daily. On 2L NC at home. Follows with Dr. Singh for pulmHTN since 2017.  Today, Braxton reports no more bleeding.  He feels a lot better than when he was in the hospital. He has normal bowel and urination patterns.

## 2024-06-25 ENCOUNTER — OUTPATIENT (OUTPATIENT)
Dept: OUTPATIENT SERVICES | Facility: HOSPITAL | Age: 79
LOS: 1 days | End: 2024-06-25
Payer: MEDICARE

## 2024-06-25 DIAGNOSIS — Z90.49 ACQUIRED ABSENCE OF OTHER SPECIFIED PARTS OF DIGESTIVE TRACT: Chronic | ICD-10-CM

## 2024-06-25 DIAGNOSIS — Z98.890 OTHER SPECIFIED POSTPROCEDURAL STATES: Chronic | ICD-10-CM

## 2024-06-25 DIAGNOSIS — Z95.0 PRESENCE OF CARDIAC PACEMAKER: Chronic | ICD-10-CM

## 2024-06-25 DIAGNOSIS — R91.1 SOLITARY PULMONARY NODULE: ICD-10-CM

## 2024-06-25 DIAGNOSIS — Z95.2 PRESENCE OF PROSTHETIC HEART VALVE: Chronic | ICD-10-CM

## 2024-07-01 ENCOUNTER — RESULT REVIEW (OUTPATIENT)
Age: 79
End: 2024-07-01

## 2024-07-01 ENCOUNTER — APPOINTMENT (OUTPATIENT)
Dept: HEMATOLOGY ONCOLOGY | Facility: CLINIC | Age: 79
End: 2024-07-01

## 2024-07-01 ENCOUNTER — TRANSCRIPTION ENCOUNTER (OUTPATIENT)
Age: 79
End: 2024-07-01

## 2024-07-01 ENCOUNTER — APPOINTMENT (OUTPATIENT)
Dept: INFUSION THERAPY | Facility: CANCER CENTER | Age: 79
End: 2024-07-01

## 2024-07-01 VITALS
HEART RATE: 71 BPM | OXYGEN SATURATION: 94 % | SYSTOLIC BLOOD PRESSURE: 111 MMHG | TEMPERATURE: 97.1 F | DIASTOLIC BLOOD PRESSURE: 61 MMHG

## 2024-07-01 DIAGNOSIS — D47.2 MONOCLONAL GAMMOPATHY: ICD-10-CM

## 2024-07-01 DIAGNOSIS — D64.9 ANEMIA, UNSPECIFIED: ICD-10-CM

## 2024-07-01 LAB
BASOPHILS # BLD AUTO: 0.03 K/UL — SIGNIFICANT CHANGE UP (ref 0–0.2)
BASOPHILS NFR BLD AUTO: 0.4 % — SIGNIFICANT CHANGE UP (ref 0–2)
EOSINOPHIL # BLD AUTO: 0.19 K/UL — SIGNIFICANT CHANGE UP (ref 0–0.5)
EOSINOPHIL NFR BLD AUTO: 2.8 % — SIGNIFICANT CHANGE UP (ref 0–6)
HCT VFR BLD CALC: 27.8 % — LOW (ref 39–50)
HGB BLD-MCNC: 8.6 G/DL — LOW (ref 13–17)
IMM GRANULOCYTES NFR BLD AUTO: 0.1 % — SIGNIFICANT CHANGE UP (ref 0–0.9)
LYMPHOCYTES # BLD AUTO: 0.48 K/UL — LOW (ref 1–3.3)
LYMPHOCYTES # BLD AUTO: 7 % — LOW (ref 13–44)
MCHC RBC-ENTMCNC: 30.9 GM/DL — LOW (ref 32–36)
MCHC RBC-ENTMCNC: 31.5 PG — SIGNIFICANT CHANGE UP (ref 27–34)
MCV RBC AUTO: 101.8 FL — HIGH (ref 80–100)
MONOCYTES # BLD AUTO: 0.81 K/UL — SIGNIFICANT CHANGE UP (ref 0–0.9)
MONOCYTES NFR BLD AUTO: 11.9 % — SIGNIFICANT CHANGE UP (ref 2–14)
NEUTROPHILS # BLD AUTO: 5.31 K/UL — SIGNIFICANT CHANGE UP (ref 1.8–7.4)
NEUTROPHILS NFR BLD AUTO: 77.8 % — HIGH (ref 43–77)
NRBC # BLD: 0 /100 WBCS — SIGNIFICANT CHANGE UP (ref 0–0)
PLATELET # BLD AUTO: 137 K/UL — LOW (ref 150–400)
RBC # BLD: 2.73 M/UL — LOW (ref 4.2–5.8)
RBC # FLD: 15 % — HIGH (ref 10.3–14.5)
WBC # BLD: 6.83 K/UL — SIGNIFICANT CHANGE UP (ref 3.8–10.5)
WBC # FLD AUTO: 6.83 K/UL — SIGNIFICANT CHANGE UP (ref 3.8–10.5)

## 2024-07-01 PROCEDURE — 99214 OFFICE O/P EST MOD 30 MIN: CPT

## 2024-07-01 PROCEDURE — G2211 COMPLEX E/M VISIT ADD ON: CPT

## 2024-07-02 DIAGNOSIS — N18.9 CHRONIC KIDNEY DISEASE, UNSPECIFIED: ICD-10-CM

## 2024-07-02 DIAGNOSIS — D63.1 ANEMIA IN CHRONIC KIDNEY DISEASE: ICD-10-CM

## 2024-07-02 LAB
CREAT SERPL-MCNC: 2.68 MG/DL
DEPRECATED KAPPA LC FREE/LAMBDA SER: 1.63 RATIO
EGFR: 24 ML/MIN/1.73M2
FERRITIN SERPL-MCNC: 51 NG/ML
FOLATE SERPL-MCNC: 10.5 NG/ML
IGA SER QL IEP: 441 MG/DL
IGG SER QL IEP: 1437 MG/DL
IGM SER QL IEP: 34 MG/DL
IRON SATN MFR SERPL: 29 %
IRON SERPL-MCNC: 78 UG/DL
KAPPA LC CSF-MCNC: 7.4 MG/DL
KAPPA LC SERPL-MCNC: 12.06 MG/DL
TIBC SERPL-MCNC: 264 UG/DL
TRANSFERRIN SERPL-MCNC: 228 MG/DL
UIBC SERPL-MCNC: 187 UG/DL
VIT B12 SERPL-MCNC: 593 PG/ML

## 2024-07-04 LAB
ALBUMIN MFR SERPL ELPH: 48.9 %
ALBUMIN SERPL-MCNC: 3.6 G/DL
ALBUMIN/GLOB SERPL: 1 RATIO
ALPHA1 GLOB MFR SERPL ELPH: 5.7 %
ALPHA1 GLOB SERPL ELPH-MCNC: 0.4 G/DL
ALPHA2 GLOB MFR SERPL ELPH: 11.7 %
ALPHA2 GLOB SERPL ELPH-MCNC: 0.9 G/DL
B-GLOBULIN MFR SERPL ELPH: 14.6 %
B-GLOBULIN SERPL ELPH-MCNC: 1.1 G/DL
GAMMA GLOB FLD ELPH-MCNC: 1.4 G/DL
GAMMA GLOB MFR SERPL ELPH: 19.1 %
INTERPRETATION SERPL IEP-IMP: NORMAL
M PROTEIN SPEC IFE-MCNC: NORMAL
PROT SERPL-MCNC: 7.3 G/DL
PROT SERPL-MCNC: 7.3 G/DL

## 2024-07-05 ENCOUNTER — APPOINTMENT (OUTPATIENT)
Dept: UROLOGY | Facility: CLINIC | Age: 79
End: 2024-07-05

## 2024-07-08 ENCOUNTER — APPOINTMENT (OUTPATIENT)
Dept: UROLOGY | Facility: CLINIC | Age: 79
End: 2024-07-08
Payer: MEDICARE

## 2024-07-08 ENCOUNTER — APPOINTMENT (OUTPATIENT)
Dept: UROLOGY | Facility: CLINIC | Age: 79
End: 2024-07-08

## 2024-07-08 VITALS
TEMPERATURE: 97.8 F | HEART RATE: 74 BPM | DIASTOLIC BLOOD PRESSURE: 63 MMHG | OXYGEN SATURATION: 92 % | WEIGHT: 181 LBS | SYSTOLIC BLOOD PRESSURE: 104 MMHG | HEIGHT: 70 IN | BODY MASS INDEX: 25.91 KG/M2

## 2024-07-08 DIAGNOSIS — R33.8 OTHER RETENTION OF URINE: ICD-10-CM

## 2024-07-08 PROCEDURE — 51702 INSERT TEMP BLADDER CATH: CPT

## 2024-07-15 ENCOUNTER — RESULT REVIEW (OUTPATIENT)
Age: 79
End: 2024-07-15

## 2024-07-15 ENCOUNTER — APPOINTMENT (OUTPATIENT)
Dept: INFUSION THERAPY | Facility: CANCER CENTER | Age: 79
End: 2024-07-15

## 2024-07-15 LAB
BASOPHILS # BLD AUTO: 0.02 K/UL — SIGNIFICANT CHANGE UP (ref 0–0.2)
BASOPHILS NFR BLD AUTO: 0.4 % — SIGNIFICANT CHANGE UP (ref 0–2)
EOSINOPHIL # BLD AUTO: 0.31 K/UL — SIGNIFICANT CHANGE UP (ref 0–0.5)
EOSINOPHIL NFR BLD AUTO: 5.5 % — SIGNIFICANT CHANGE UP (ref 0–6)
HCT VFR BLD CALC: 31 % — LOW (ref 39–50)
HGB BLD-MCNC: 9.3 G/DL — LOW (ref 13–17)
IMM GRANULOCYTES NFR BLD AUTO: 0.4 % — SIGNIFICANT CHANGE UP (ref 0–0.9)
LYMPHOCYTES # BLD AUTO: 0.49 K/UL — LOW (ref 1–3.3)
LYMPHOCYTES # BLD AUTO: 8.7 % — LOW (ref 13–44)
MCHC RBC-ENTMCNC: 30 GM/DL — LOW (ref 32–36)
MCHC RBC-ENTMCNC: 30.7 PG — SIGNIFICANT CHANGE UP (ref 27–34)
MCV RBC AUTO: 102.3 FL — HIGH (ref 80–100)
MONOCYTES # BLD AUTO: 0.77 K/UL — SIGNIFICANT CHANGE UP (ref 0–0.9)
MONOCYTES NFR BLD AUTO: 13.7 % — SIGNIFICANT CHANGE UP (ref 2–14)
NEUTROPHILS # BLD AUTO: 4 K/UL — SIGNIFICANT CHANGE UP (ref 1.8–7.4)
NEUTROPHILS NFR BLD AUTO: 71.3 % — SIGNIFICANT CHANGE UP (ref 43–77)
NRBC # BLD: 0 /100 WBCS — SIGNIFICANT CHANGE UP (ref 0–0)
PLATELET # BLD AUTO: 155 K/UL — SIGNIFICANT CHANGE UP (ref 150–400)
RBC # BLD: 3.03 M/UL — LOW (ref 4.2–5.8)
RBC # FLD: 15.9 % — HIGH (ref 10.3–14.5)
WBC # BLD: 5.61 K/UL — SIGNIFICANT CHANGE UP (ref 3.8–10.5)
WBC # FLD AUTO: 5.61 K/UL — SIGNIFICANT CHANGE UP (ref 3.8–10.5)

## 2024-07-22 ENCOUNTER — RESULT REVIEW (OUTPATIENT)
Age: 79
End: 2024-07-22

## 2024-07-22 ENCOUNTER — APPOINTMENT (OUTPATIENT)
Dept: INFUSION THERAPY | Facility: CANCER CENTER | Age: 79
End: 2024-07-22

## 2024-07-22 LAB
BASOPHILS # BLD AUTO: 0.02 K/UL — SIGNIFICANT CHANGE UP (ref 0–0.2)
BASOPHILS NFR BLD AUTO: 0.3 % — SIGNIFICANT CHANGE UP (ref 0–2)
EOSINOPHIL # BLD AUTO: 0.18 K/UL — SIGNIFICANT CHANGE UP (ref 0–0.5)
EOSINOPHIL NFR BLD AUTO: 2.9 % — SIGNIFICANT CHANGE UP (ref 0–6)
HCT VFR BLD CALC: 31.3 % — LOW (ref 39–50)
HGB BLD-MCNC: 9.2 G/DL — LOW (ref 13–17)
IMM GRANULOCYTES NFR BLD AUTO: 0.3 % — SIGNIFICANT CHANGE UP (ref 0–0.9)
LYMPHOCYTES # BLD AUTO: 0.59 K/UL — LOW (ref 1–3.3)
LYMPHOCYTES # BLD AUTO: 9.5 % — LOW (ref 13–44)
MCHC RBC-ENTMCNC: 29.4 GM/DL — LOW (ref 32–36)
MCHC RBC-ENTMCNC: 30.7 PG — SIGNIFICANT CHANGE UP (ref 27–34)
MCV RBC AUTO: 104.3 FL — HIGH (ref 80–100)
MONOCYTES # BLD AUTO: 0.86 K/UL — SIGNIFICANT CHANGE UP (ref 0–0.9)
MONOCYTES NFR BLD AUTO: 13.9 % — SIGNIFICANT CHANGE UP (ref 2–14)
NEUTROPHILS # BLD AUTO: 4.51 K/UL — SIGNIFICANT CHANGE UP (ref 1.8–7.4)
NEUTROPHILS NFR BLD AUTO: 73.1 % — SIGNIFICANT CHANGE UP (ref 43–77)
NRBC # BLD: 0 /100 WBCS — SIGNIFICANT CHANGE UP (ref 0–0)
PLATELET # BLD AUTO: 151 K/UL — SIGNIFICANT CHANGE UP (ref 150–400)
RBC # BLD: 3 M/UL — LOW (ref 4.2–5.8)
RBC # FLD: 16.4 % — HIGH (ref 10.3–14.5)
WBC # BLD: 6.18 K/UL — SIGNIFICANT CHANGE UP (ref 3.8–10.5)
WBC # FLD AUTO: 6.18 K/UL — SIGNIFICANT CHANGE UP (ref 3.8–10.5)

## 2024-07-23 NOTE — DISCHARGE NOTE PROVIDER - CARE PROVIDER_API CALL
"Abnormal EKG.  EKG was read as atrial flutter with variable A-V blocK.  I doubt that it is atrial flutter.  Please discussed with cardiology team in the morning.  Will monitor.    /62   Pulse 89   Temp 36.3 °C (97.3 °F)   Resp 18   Ht 1.702 m (5' 7\")   Wt 68.5 kg (151 lb 1.6 oz)   SpO2 93%   BMI 23.67 kg/m²     "
"Follow Up Phone Call    Outgoing phone call    Spoke to: Gigi Carver Relationship:self   Phone number: [phone]      Outcome: contacted patient/ family   No chief complaint on file.         Diagnosis:Not applicable    Patient answered phone in a panic, stating \"I was praying to God and you called. I only slept 3 hrs last night.\" States his gut is in terrible pain he wants to come back to hospital but does not know how to get there. I offered to call EMS. I waited on the phone with him until EMS came. They only go to [place]. I called [place] ED and gave them an update. I also notified his neighbor by request Satya [phone], Lilia [phone].          "
Cardiology,   Phone: (   )    -  Fax: (   )    -  Follow Up Time:     June De La Torre  [facility]  [address] [place] [address], [address] [place] [address]  Phone: [phone]  Fax: [phone]  Follow Up Time:

## 2024-07-31 ENCOUNTER — RESULT REVIEW (OUTPATIENT)
Age: 79
End: 2024-07-31

## 2024-07-31 ENCOUNTER — APPOINTMENT (OUTPATIENT)
Dept: INFUSION THERAPY | Facility: CANCER CENTER | Age: 79
End: 2024-07-31

## 2024-07-31 ENCOUNTER — APPOINTMENT (OUTPATIENT)
Dept: HEMATOLOGY ONCOLOGY | Facility: CLINIC | Age: 79
End: 2024-07-31
Payer: MEDICARE

## 2024-07-31 VITALS
DIASTOLIC BLOOD PRESSURE: 60 MMHG | TEMPERATURE: 97.3 F | HEART RATE: 73 BPM | SYSTOLIC BLOOD PRESSURE: 107 MMHG | OXYGEN SATURATION: 94 %

## 2024-07-31 DIAGNOSIS — N18.4 CHRONIC KIDNEY DISEASE, STAGE 4 (SEVERE): ICD-10-CM

## 2024-07-31 DIAGNOSIS — D47.2 MONOCLONAL GAMMOPATHY: ICD-10-CM

## 2024-07-31 DIAGNOSIS — E61.1 IRON DEFICIENCY: ICD-10-CM

## 2024-07-31 DIAGNOSIS — D63.1 CHRONIC KIDNEY DISEASE, STAGE 4 (SEVERE): ICD-10-CM

## 2024-07-31 DIAGNOSIS — D64.9 ANEMIA, UNSPECIFIED: ICD-10-CM

## 2024-07-31 LAB
BASOPHILS # BLD AUTO: 0.03 K/UL — SIGNIFICANT CHANGE UP (ref 0–0.2)
BASOPHILS NFR BLD AUTO: 0.4 % — SIGNIFICANT CHANGE UP (ref 0–2)
EOSINOPHIL # BLD AUTO: 0.14 K/UL — SIGNIFICANT CHANGE UP (ref 0–0.5)
EOSINOPHIL NFR BLD AUTO: 1.9 % — SIGNIFICANT CHANGE UP (ref 0–6)
HCT VFR BLD CALC: 32.4 % — LOW (ref 39–50)
HGB BLD-MCNC: 9.6 G/DL — LOW (ref 13–17)
IMM GRANULOCYTES NFR BLD AUTO: 0.4 % — SIGNIFICANT CHANGE UP (ref 0–0.9)
LYMPHOCYTES # BLD AUTO: 0.64 K/UL — LOW (ref 1–3.3)
LYMPHOCYTES # BLD AUTO: 8.6 % — LOW (ref 13–44)
MCHC RBC-ENTMCNC: 29.6 GM/DL — LOW (ref 32–36)
MCHC RBC-ENTMCNC: 30.6 PG — SIGNIFICANT CHANGE UP (ref 27–34)
MCV RBC AUTO: 103.2 FL — HIGH (ref 80–100)
MONOCYTES # BLD AUTO: 1.16 K/UL — HIGH (ref 0–0.9)
MONOCYTES NFR BLD AUTO: 15.6 % — HIGH (ref 2–14)
NEUTROPHILS # BLD AUTO: 5.45 K/UL — SIGNIFICANT CHANGE UP (ref 1.8–7.4)
NEUTROPHILS NFR BLD AUTO: 73.1 % — SIGNIFICANT CHANGE UP (ref 43–77)
NRBC # BLD: 0 /100 WBCS — SIGNIFICANT CHANGE UP (ref 0–0)
PLATELET # BLD AUTO: 148 K/UL — LOW (ref 150–400)
RBC # BLD: 3.14 M/UL — LOW (ref 4.2–5.8)
RBC # FLD: 16.1 % — HIGH (ref 10.3–14.5)
WBC # BLD: 7.45 K/UL — SIGNIFICANT CHANGE UP (ref 3.8–10.5)
WBC # FLD AUTO: 7.45 K/UL — SIGNIFICANT CHANGE UP (ref 3.8–10.5)

## 2024-07-31 PROCEDURE — 96372 THER/PROPH/DIAG INJ SC/IM: CPT

## 2024-07-31 PROCEDURE — G2211 COMPLEX E/M VISIT ADD ON: CPT

## 2024-07-31 PROCEDURE — 85027 COMPLETE CBC AUTOMATED: CPT

## 2024-07-31 PROCEDURE — 99214 OFFICE O/P EST MOD 30 MIN: CPT

## 2024-07-31 NOTE — PHYSICAL EXAM
[Capable of only limited self care, confined to bed or chair more than 50% of waking hours] : Status 3- Capable of only limited self care, confined to bed or chair more than 50% of waking hours [Normal] : grossly intact [de-identified] : anicteric [de-identified] : decreased air entry diffusely, wears nasal O2 4 l/min [de-identified] : altered gait, ambulates with walker

## 2024-07-31 NOTE — HISTORY OF PRESENT ILLNESS
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 1.9), and pHTN ([facility] 9/2023 w/ mPAP 36, PCWP 11 on Tyvaso/Sildenafil) c/b chronic hypoxemic respiratory failure, recently admitted with Klebsiella UTI and severe TIERRA on CKD w/ c/f uremic encephalopathy, worsening anemia.  Escalated to MICU for CRRT, c/b recurrent AMS with VT preceding seizure activity.  At baseline, independent in ADLs. Ambulates with a cane. Lives at home alone with home attendants who come daily. On 2L NC at home. Follows with Dr. Singh for pulmHTN since 2017.  Today, Braxton reports no more bleeding.  He feels a lot better than when he was in the hospital. He has normal bowel and urination patterns. [de-identified] : Patient presents for follow up.  He reports he will be having a PPM/Defibrillator change next week at [place].  Reports increased fatigue and tiredness. Denies fever, SOB, chest pain, night sweats, or weight loss. No spontaneous bruising or bleeding.

## 2024-07-31 NOTE — REASON FOR VISIT
[Follow-Up Visit] : a follow-up visit for [Blood Count Assessment] : blood count assessment [Other: _____] : [unfilled]

## 2024-07-31 NOTE — REVIEW OF SYSTEMS
[Chills] : no chills [Fatigue] : fatigue [Vision Problems] : no vision problems [Nosebleeds] : no nosebleeds [Chest Pain] : no chest pain [Shortness Of Breath] : no shortness of breath [SOB on Exertion] : shortness of breath during exertion [Abdominal Pain] : no abdominal pain [Joint Stiffness] : joint stiffness [Easy Bleeding] : no tendency for easy bleeding [Easy Bruising] : no tendency for easy bruising [FreeTextEntry6] : wears nasal O2 [de-identified] : scattered ecchymosis

## 2024-08-01 LAB
ALBUMIN SERPL ELPH-MCNC: 3.5 G/DL
ALP BLD-CCNC: 74 U/L
ALT SERPL-CCNC: 5 U/L
ANION GAP SERPL CALC-SCNC: 16 MMOL/L
AST SERPL-CCNC: 9 U/L
BILIRUB SERPL-MCNC: 0.7 MG/DL
BUN SERPL-MCNC: 107 MG/DL
CALCIUM SERPL-MCNC: 9.1 MG/DL
CHLORIDE SERPL-SCNC: 107 MMOL/L
CO2 SERPL-SCNC: 23 MMOL/L
CREAT SERPL-MCNC: 3.34 MG/DL
EGFR: 18 ML/MIN/1.73M2
GLUCOSE SERPL-MCNC: 113 MG/DL
POTASSIUM SERPL-SCNC: 5.7 MMOL/L
PROT SERPL-MCNC: 6.5 G/DL
SODIUM SERPL-SCNC: 146 MMOL/L

## 2024-08-06 ENCOUNTER — APPOINTMENT (OUTPATIENT)
Dept: INFUSION THERAPY | Facility: CANCER CENTER | Age: 79
End: 2024-08-06

## 2024-08-06 ENCOUNTER — APPOINTMENT (OUTPATIENT)
Dept: HEMATOLOGY ONCOLOGY | Facility: CLINIC | Age: 79
End: 2024-08-06

## 2024-08-06 ENCOUNTER — APPOINTMENT (OUTPATIENT)
Dept: PULMONOLOGY | Facility: CLINIC | Age: 79
End: 2024-08-06

## 2024-08-12 ENCOUNTER — APPOINTMENT (OUTPATIENT)
Dept: UROLOGY | Facility: CLINIC | Age: 79
End: 2024-08-12

## 2024-08-12 ENCOUNTER — APPOINTMENT (OUTPATIENT)
Dept: INFUSION THERAPY | Facility: CANCER CENTER | Age: 79
End: 2024-08-12

## 2024-08-12 ENCOUNTER — APPOINTMENT (OUTPATIENT)
Dept: HEMATOLOGY ONCOLOGY | Facility: CLINIC | Age: 79
End: 2024-08-12

## 2024-08-19 ENCOUNTER — APPOINTMENT (OUTPATIENT)
Dept: INFUSION THERAPY | Facility: CANCER CENTER | Age: 79
End: 2024-08-19

## 2024-08-26 ENCOUNTER — APPOINTMENT (OUTPATIENT)
Dept: INFUSION THERAPY | Facility: CANCER CENTER | Age: 79
End: 2024-08-26

## 2024-09-03 ENCOUNTER — APPOINTMENT (OUTPATIENT)
Dept: INFUSION THERAPY | Facility: CANCER CENTER | Age: 79
End: 2024-09-03

## 2024-09-03 ENCOUNTER — APPOINTMENT (OUTPATIENT)
Dept: HEMATOLOGY ONCOLOGY | Facility: CLINIC | Age: 79
End: 2024-09-03

## 2024-09-09 ENCOUNTER — APPOINTMENT (OUTPATIENT)
Dept: INFUSION THERAPY | Facility: CANCER CENTER | Age: 79
End: 2024-09-09

## 2024-09-09 ENCOUNTER — APPOINTMENT (OUTPATIENT)
Dept: HEMATOLOGY ONCOLOGY | Facility: CLINIC | Age: 79
End: 2024-09-09

## 2024-09-16 ENCOUNTER — APPOINTMENT (OUTPATIENT)
Dept: HEMATOLOGY ONCOLOGY | Facility: CLINIC | Age: 79
End: 2024-09-16

## 2024-09-16 ENCOUNTER — APPOINTMENT (OUTPATIENT)
Dept: INFUSION THERAPY | Facility: CANCER CENTER | Age: 79
End: 2024-09-16

## 2024-10-28 ENCOUNTER — APPOINTMENT (OUTPATIENT)
Dept: HEMATOLOGY ONCOLOGY | Facility: CLINIC | Age: 79
End: 2024-10-28

## 2025-01-21 NOTE — H&P PST ADULT - NSICDXPASTMEDICALHX_GEN_ALL_CORE_FT
Male
PAST MEDICAL HISTORY:  Atrial fibrillation     Bladder-neck obstruction     Chronic renal insufficiency     History of cirrhosis of liver     HLD (hyperlipidemia)     HTN (hypertension)     Hypothyroidism     MARIMAR (obstructive sleep apnea)     Pulmonary hypertension

## 2025-02-17 NOTE — PHYSICAL THERAPY INITIAL EVALUATION ADULT - MD/RN NOTIFIED
[] [facility]  Outpatient Rehabilitation &  Therapy  [address]  [phone]  [phone] [x] [facility]  Outpatient Rehabilitation &  Therapy  [address]  P: [phone]  F: [phone] [] [facility]  Outpatient Rehabilitation &  Therapy  [address]  P: [phone]  F: [phone] [] [facility]  Outpatient Rehabilitation &  Therapy  [address]  [phone]  [phone] [] [facility]  Outpatient Rehabilitation &  Therapy  [address]   P: [phone]  F: [phone]  [] [facility]  Outpatient Rehabilitation &  Therapy  [address]  P: [phone]  F: [phone] [] [facility]  Outpatient Rehabilitation &  Therapy  [address]  P: [phone]  F: [phone] [] [facility]  Outpatient Rehabilitation &  Therapy  [address]  P: [phone]  F: [phone] [] [facility]  Outpatient Rehabilitation &  Therapy  [address]  P: [phone]  F: [phone]  [] [facility] Outpatient Rehabilitation &  Therapy  [address]  P: [phone]  F: [phone]     Physical Therapy Daily Treatment Note    Date:  2025  Patient Name:  Agueda Rain    :  1959 MRN: [id number]  Physician: Kate Vallejo APRN-FLIPN                                  Insurance: [facility] (Auth after 8)  Medical Diagnosis:   M47.26 (ICD-10-CM) - Other spondylosis with radiculopathy, lumbar region   M53.9 (ICD-10-CM) - Multilevel degenerative disc disease   M48.061 (ICD-10-CM) - Lumbar stenosis without neurogenic claudication   Rehab Codes: M25.551, M25.552, M25.651, M25.652,M79.604, M79.605  Onset Date: 25 (script)             Next 's appt.: 3/24/25  Visit# / total visits: 3/12;  yes

## 2025-06-26 NOTE — ASU PREOP CHECKLIST - ORDERS/MEDICATION ADMINISTRATION RECORD ON CHART
1 month refill sent to pharmacy. Patient is due for med check next month. Further refills will be addressed then.    Savi Sultana MD     done

## 2025-07-07 NOTE — ED PROVIDER NOTE - IV ALTEPLASE EXCL ABS HIDDEN
Requesting assistance with transferring care. Pt seen twice in UC for knee pain and swelling, difficulty walking due to left knee pain. Hx RA.     Plan for eval/tx at noon today and will discuss placement.    show

## (undated) DEVICE — Device

## (undated) DEVICE — SOL IRR POUR H2O 500ML

## (undated) DEVICE — CABLE DAC ACTIVE CORD

## (undated) DEVICE — TUBING TUR 2 PRONG

## (undated) DEVICE — VENODYNE/SCD SLEEVE CALF MEDIUM

## (undated) DEVICE — GLV 7.5 PROTEXIS (WHITE)

## (undated) DEVICE — FORCEP RADIAL JAW 4 W NDL 2.4MM 2.8MM 240CM ORANGE DISP

## (undated) DEVICE — LABELS BLANK W PEN

## (undated) DEVICE — POSITIONER FOAM EGG CRATE ULNAR 2PCS (PINK)

## (undated) DEVICE — ELCTR CUTTING LOOP 24FR RIGHT ANGLE

## (undated) DEVICE — PREP BETADINE SPONGE STICKS

## (undated) DEVICE — VALVE ENDOSCOPE DEFENDO SINGLE USE

## (undated) DEVICE — BASIN SET DOUBLE

## (undated) DEVICE — POSITIONER PATIENT SAFETY STRAP 3X60"

## (undated) DEVICE — DRAPE BACK TABLE COVER 44X90"

## (undated) DEVICE — ELCTR GROUNDING PAD ADULT COVIDIEN

## (undated) DEVICE — GOWN XL W TOWEL

## (undated) DEVICE — TUBING SUCTION NONCONDUCTIVE 6MM X 12FT

## (undated) DEVICE — SOL IRR BAG H2O 3000ML

## (undated) DEVICE — WARMING BLANKET UPPER ADULT